# Patient Record
Sex: FEMALE
[De-identification: names, ages, dates, MRNs, and addresses within clinical notes are randomized per-mention and may not be internally consistent; named-entity substitution may affect disease eponyms.]

---

## 2021-03-20 ENCOUNTER — NURSE TRIAGE (OUTPATIENT)
Dept: OTHER | Facility: CLINIC | Age: 71
End: 2021-03-20

## 2021-03-21 NOTE — TELEPHONE ENCOUNTER
Reason for Disposition   [1] MILD-MODERATE headache AND [2] present > 72 hours    Answer Assessment - Initial Assessment Questions  1. LOCATION: \"Where does it hurt? \"   Top of head    2. ONSET: \"When did the headache start? \" (Minutes, hours or days)   Has had it for the past week    3. PATTERN: \"Does the pain come and go, or has it been constant since it started? \"  Comes and goes with tylenol    4. SEVERITY: \"How bad is the pain? \" and \"What does it keep you from doing? \"  (e.g., Scale 1-10; mild, moderate, or severe)    - MILD (1-3): doesn't interfere with normal activities     - MODERATE (4-7): interferes with normal activities or awakens from sleep     - SEVERE (8-10): excruciating pain, unable to do any normal activities     4/10    5. RECURRENT SYMPTOM: \"Have you ever had headaches before? \" If so, ask: \"When was the last time? \" and \"What happened that time? \"      No    6. CAUSE: \"What do you think is causing the headache? \"  Unsure    7. MIGRAINE: \"Have you been diagnosed with migraine headaches? \" If so, ask: \"Is this headache similar? \"   No    8. HEAD INJURY: \"Has there been any recent injury to the head? \"    no    9. OTHER SYMPTOMS: \"Do you have any other symptoms? \" (fever, stiff neck, eye pain, sore throat, cold symptoms)  No    10. PREGNANCY: \"Is there any chance you are pregnant? \" \"When was your last menstrual period? \"      no    Protocols used: HEADACHE-ADULT-    Brief description of triage: headache    Triage indicates for patient to be seen within 3 days. Care advice provided, patient verbalizes understanding; denies any other questions or concerns; instructed to call back for any new or worsening symptoms. This triage is a result of a call to [address] [place] [address]. Please do not respond to the triage nurse through this encounter. Any subsequent communication should be directly with the patient.

## 2023-03-08 PROBLEM — M54.12 LEFT CERVICAL RADICULOPATHY: Status: ACTIVE | Noted: 2023-03-08

## 2023-03-08 PROBLEM — N28.89 RIGHT KIDNEY MASS: Status: ACTIVE | Noted: 2023-03-08

## 2023-03-08 PROBLEM — E53.8 DEFICIENCY OF VITAMIN B12: Status: ACTIVE | Noted: 2023-03-08

## 2023-03-08 PROBLEM — R31.9 HEMATURIA: Status: ACTIVE | Noted: 2023-03-08

## 2023-03-08 PROBLEM — G91.2 NORMAL PRESSURE HYDROCEPHALUS (MULTI): Status: ACTIVE | Noted: 2023-03-08

## 2023-03-08 PROBLEM — R92.8 ABNORMAL MAMMOGRAPHY: Status: ACTIVE | Noted: 2023-03-08

## 2023-03-08 PROBLEM — F41.8 DEPRESSION WITH ANXIETY: Status: ACTIVE | Noted: 2023-03-08

## 2023-03-08 PROBLEM — M25.561 KNEE PAIN, RIGHT: Status: ACTIVE | Noted: 2023-03-08

## 2023-03-08 PROBLEM — S03.40XA TMJ (SPRAIN OF TEMPOROMANDIBULAR JOINT): Status: ACTIVE | Noted: 2023-03-08

## 2023-03-08 PROBLEM — E11.9 DIABETES MELLITUS TYPE 2, CONTROLLED (MULTI): Status: ACTIVE | Noted: 2023-03-08

## 2023-03-08 PROBLEM — H43.392 VITREOUS FLOATERS OF LEFT EYE: Status: ACTIVE | Noted: 2023-03-08

## 2023-03-08 PROBLEM — N32.0 BLADDER OUTLET OBSTRUCTION: Status: ACTIVE | Noted: 2023-03-08

## 2023-03-08 PROBLEM — I65.02 VERTEBRAL ARTERY OCCLUSION, LEFT: Status: ACTIVE | Noted: 2023-03-08

## 2023-03-08 PROBLEM — M16.11 PRIMARY LOCALIZED OSTEOARTHRITIS OF RIGHT HIP: Status: ACTIVE | Noted: 2023-03-08

## 2023-03-08 PROBLEM — M96.1 POSTLAMINECTOMY SYNDROME, LUMBAR: Status: ACTIVE | Noted: 2023-03-08

## 2023-03-08 PROBLEM — R16.0 LIVER MASS: Status: ACTIVE | Noted: 2023-03-08
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
## 2023-03-08 PROBLEM — M54.12 CERVICAL RADICULOPATHY AT C6: Status: ACTIVE | Noted: 2023-03-08

## 2023-03-08 PROBLEM — M79.18 MYOFASCIAL PAIN SYNDROME: Status: ACTIVE | Noted: 2023-03-08

## 2023-03-08 PROBLEM — B35.6 TINEA CRURIS: Status: ACTIVE | Noted: 2023-03-08

## 2023-03-08 PROBLEM — M70.60 TROCHANTERIC BURSITIS: Status: ACTIVE | Noted: 2023-03-08

## 2023-03-08 PROBLEM — M75.42 IMPINGEMENT SYNDROME OF LEFT SHOULDER: Status: ACTIVE | Noted: 2023-03-08

## 2023-03-08 PROBLEM — R07.89 ATYPICAL CHEST PAIN: Status: ACTIVE | Noted: 2023-03-08

## 2023-03-08 PROBLEM — M25.551 RIGHT HIP PAIN: Status: ACTIVE | Noted: 2023-03-08

## 2023-03-08 PROBLEM — M54.16 LUMBAR RADICULITIS: Status: ACTIVE | Noted: 2023-03-08

## 2023-03-08 PROBLEM — I10 ESSENTIAL HYPERTENSION: Status: ACTIVE | Noted: 2023-03-08

## 2023-03-08 PROBLEM — K59.09 CHRONIC CONSTIPATION: Status: ACTIVE | Noted: 2023-03-08

## 2023-03-08 PROBLEM — I25.10 CAD (CORONARY ARTERY DISEASE): Status: ACTIVE | Noted: 2023-03-08

## 2023-03-08 PROBLEM — M25.50 JOINT PAIN: Status: ACTIVE | Noted: 2023-03-08

## 2023-03-08 PROBLEM — D64.9 ANEMIA: Status: ACTIVE | Noted: 2023-03-08

## 2023-03-08 PROBLEM — Z85.3 HISTORY OF BREAST CANCER: Status: ACTIVE | Noted: 2023-03-08

## 2023-03-08 PROBLEM — K21.9 GERD WITHOUT ESOPHAGITIS: Status: ACTIVE | Noted: 2023-03-08

## 2023-03-08 PROBLEM — M25.562 KNEE PAIN, LEFT: Status: ACTIVE | Noted: 2023-03-08

## 2023-03-08 PROBLEM — Z96.652 STATUS POST LEFT KNEE REPLACEMENT: Status: ACTIVE | Noted: 2023-03-08

## 2023-03-08 PROBLEM — C64.1 RENAL CELL CARCINOMA OF RIGHT KIDNEY (MULTI): Status: ACTIVE | Noted: 2023-03-08

## 2023-03-08 PROBLEM — G89.29 CHRONIC BACK PAIN: Status: ACTIVE | Noted: 2023-03-08

## 2023-03-08 PROBLEM — H65.92 LEFT SEROUS OTITIS MEDIA: Status: ACTIVE | Noted: 2023-03-08

## 2023-03-08 PROBLEM — J02.9 SORE THROAT: Status: ACTIVE | Noted: 2023-03-08

## 2023-03-08 PROBLEM — R42 DIZZINESS: Status: ACTIVE | Noted: 2023-03-08

## 2023-03-08 PROBLEM — M53.3 SACROILIAC JOINT PAIN: Status: ACTIVE | Noted: 2023-03-08

## 2023-03-08 PROBLEM — G89.4 CHRONIC PAIN SYNDROME: Status: ACTIVE | Noted: 2023-03-08

## 2023-03-08 PROBLEM — M48.061 LUMBAR STENOSIS: Status: ACTIVE | Noted: 2023-03-08

## 2023-03-08 PROBLEM — R53.83 FATIGUE: Status: ACTIVE | Noted: 2023-03-08

## 2023-03-08 PROBLEM — M25.511 BILATERAL SHOULDER PAIN: Status: ACTIVE | Noted: 2023-03-08

## 2023-03-08 PROBLEM — M79.609 LIMB PAIN: Status: ACTIVE | Noted: 2023-03-08

## 2023-03-08 PROBLEM — R51.9 HEADACHE, CHRONIC DAILY: Status: ACTIVE | Noted: 2023-03-08

## 2023-03-08 PROBLEM — M25.512 BILATERAL SHOULDER PAIN: Status: ACTIVE | Noted: 2023-03-08

## 2023-03-08 PROBLEM — G47.00 INSOMNIA: Status: ACTIVE | Noted: 2023-03-08

## 2023-03-08 PROBLEM — M75.41 IMPINGEMENT SYNDROME OF RIGHT SHOULDER: Status: ACTIVE | Noted: 2023-03-08

## 2023-03-08 PROBLEM — M54.9 CHRONIC BACK PAIN: Status: ACTIVE | Noted: 2023-03-08

## 2023-03-08 PROBLEM — E78.00 HYPERCHOLESTEROLEMIA: Status: ACTIVE | Noted: 2023-03-08

## 2023-03-08 PROBLEM — R06.02 SOB (SHORTNESS OF BREATH) ON EXERTION: Status: ACTIVE | Noted: 2023-03-08

## 2023-03-08 PROBLEM — B37.0 ORAL THRUSH: Status: ACTIVE | Noted: 2023-03-08

## 2023-03-08 PROBLEM — N20.0 RENAL CALCULUS: Status: ACTIVE | Noted: 2023-03-08

## 2023-03-08 PROBLEM — M43.10 DEGENERATIVE SPONDYLOLISTHESIS: Status: ACTIVE | Noted: 2023-03-08

## 2023-03-08 PROBLEM — M17.11 PRIMARY OSTEOARTHRITIS OF RIGHT KNEE: Status: ACTIVE | Noted: 2023-03-08

## 2023-03-08 PROBLEM — M15.9 GENERALIZED OSTEOARTHRITIS OF MULTIPLE SITES: Status: ACTIVE | Noted: 2023-03-08

## 2023-03-08 PROBLEM — M54.50 LOW BACK PAIN: Status: ACTIVE | Noted: 2023-03-08

## 2023-03-08 RX ORDER — ATORVASTATIN CALCIUM 20 MG/1
10 TABLET, FILM COATED ORAL DAILY
COMMUNITY
Start: 2014-09-22 | End: 2023-07-10

## 2023-03-08 RX ORDER — ZOLPIDEM TARTRATE 10 MG/1
TABLET ORAL
COMMUNITY
Start: 2019-12-05 | End: 2023-06-27 | Stop reason: ALTCHOICE

## 2023-03-08 RX ORDER — HYDROCHLOROTHIAZIDE 50 MG/1
50 TABLET ORAL DAILY
COMMUNITY
Start: 2017-11-09

## 2023-03-08 RX ORDER — POTASSIUM CHLORIDE 1500 MG/1
TABLET, EXTENDED RELEASE ORAL
COMMUNITY
Start: 2017-11-09 | End: 2023-06-27 | Stop reason: SDUPTHER

## 2023-03-08 RX ORDER — ASPIRIN 81 MG/1
1 TABLET ORAL DAILY
COMMUNITY
Start: 2021-04-02

## 2023-03-08 RX ORDER — HYDROCORTISONE 25 MG/G
CREAM TOPICAL 2 TIMES DAILY
COMMUNITY
Start: 2022-09-19

## 2023-03-08 RX ORDER — DULOXETIN HYDROCHLORIDE 60 MG/1
60 CAPSULE, DELAYED RELEASE ORAL DAILY
COMMUNITY
Start: 2017-03-01 | End: 2023-09-28

## 2023-03-08 RX ORDER — METFORMIN HYDROCHLORIDE 500 MG/1
TABLET, EXTENDED RELEASE ORAL
COMMUNITY
Start: 2014-10-24 | End: 2023-06-27 | Stop reason: ALTCHOICE

## 2023-03-08 RX ORDER — TRAZODONE HYDROCHLORIDE 50 MG/1
TABLET ORAL
COMMUNITY
Start: 2019-11-22 | End: 2023-07-12

## 2023-03-08 RX ORDER — BLOOD-GLUCOSE METER
EACH MISCELLANEOUS
COMMUNITY
Start: 2022-09-19 | End: 2024-02-26 | Stop reason: SDUPTHER

## 2023-03-08 RX ORDER — AMLODIPINE BESYLATE 5 MG/1
5 TABLET ORAL DAILY
COMMUNITY
Start: 2020-02-11 | End: 2023-07-24

## 2023-03-08 RX ORDER — VALACYCLOVIR HYDROCHLORIDE 500 MG/1
500 TABLET, FILM COATED ORAL 2 TIMES DAILY
COMMUNITY
End: 2023-06-27 | Stop reason: ALTCHOICE

## 2023-03-15 ENCOUNTER — OFFICE VISIT (OUTPATIENT)
Dept: PRIMARY CARE | Facility: CLINIC | Age: 73
End: 2023-03-15
Payer: MEDICARE

## 2023-03-15 VITALS
OXYGEN SATURATION: 94 % | TEMPERATURE: 97.9 F | BODY MASS INDEX: 23.28 KG/M2 | SYSTOLIC BLOOD PRESSURE: 120 MMHG | HEIGHT: 66 IN | DIASTOLIC BLOOD PRESSURE: 80 MMHG | HEART RATE: 94 BPM

## 2023-03-15 DIAGNOSIS — M54.50 CHRONIC LOW BACK PAIN, UNSPECIFIED BACK PAIN LATERALITY, UNSPECIFIED WHETHER SCIATICA PRESENT: ICD-10-CM

## 2023-03-15 DIAGNOSIS — G89.29 CHRONIC LOW BACK PAIN, UNSPECIFIED BACK PAIN LATERALITY, UNSPECIFIED WHETHER SCIATICA PRESENT: ICD-10-CM

## 2023-03-15 DIAGNOSIS — E11.8 CONTROLLED TYPE 2 DIABETES MELLITUS WITH COMPLICATION, WITHOUT LONG-TERM CURRENT USE OF INSULIN (MULTI): Primary | ICD-10-CM

## 2023-03-15 DIAGNOSIS — E78.00 HYPERCHOLESTEROLEMIA: ICD-10-CM

## 2023-03-15 PROBLEM — C64.1 RENAL CELL CARCINOMA OF RIGHT KIDNEY (MULTI): Status: RESOLVED | Noted: 2023-03-08 | Resolved: 2023-03-15

## 2023-03-15 PROCEDURE — 1036F TOBACCO NON-USER: CPT | Performed by: FAMILY MEDICINE

## 2023-03-15 PROCEDURE — 99213 OFFICE O/P EST LOW 20 MIN: CPT | Performed by: FAMILY MEDICINE

## 2023-03-15 PROCEDURE — 3074F SYST BP LT 130 MM HG: CPT | Performed by: FAMILY MEDICINE

## 2023-03-15 PROCEDURE — 3079F DIAST BP 80-89 MM HG: CPT | Performed by: FAMILY MEDICINE

## 2023-03-15 PROCEDURE — 1159F MED LIST DOCD IN RCRD: CPT | Performed by: FAMILY MEDICINE

## 2023-03-15 RX ORDER — LANCETS 33 GAUGE
EACH MISCELLANEOUS
COMMUNITY
Start: 2022-10-07

## 2023-03-15 RX ORDER — MULTIVIT-MIN/IRON FUM/FOLIC AC 7.5 MG-4
TABLET ORAL
COMMUNITY

## 2023-03-15 RX ORDER — ATORVASTATIN CALCIUM 20 MG/1
0.5 TABLET, FILM COATED ORAL DAILY
COMMUNITY
Start: 2014-09-22 | End: 2023-03-15

## 2023-03-15 RX ORDER — OXYCODONE AND ACETAMINOPHEN 5; 325 MG/1; MG/1
1 TABLET ORAL 3 TIMES DAILY
COMMUNITY

## 2023-03-15 RX ORDER — DULOXETIN HYDROCHLORIDE 60 MG/1
1 CAPSULE, DELAYED RELEASE ORAL DAILY
COMMUNITY
Start: 2017-03-01 | End: 2023-06-27 | Stop reason: SDUPTHER

## 2023-03-15 RX ORDER — METFORMIN HYDROCHLORIDE 500 MG/1
TABLET, EXTENDED RELEASE ORAL 2 TIMES DAILY
COMMUNITY
Start: 2014-10-24 | End: 2023-03-15

## 2023-03-15 RX ORDER — OMEPRAZOLE 40 MG/1
CAPSULE, DELAYED RELEASE ORAL
COMMUNITY
Start: 2022-10-31 | End: 2023-06-27 | Stop reason: ALTCHOICE

## 2023-03-15 RX ORDER — POTASSIUM CHLORIDE 20 MEQ/1
TABLET, EXTENDED RELEASE ORAL
COMMUNITY
Start: 2023-02-25 | End: 2023-03-15

## 2023-03-15 RX ORDER — AMLODIPINE BESYLATE 5 MG/1
1 TABLET ORAL DAILY
COMMUNITY
Start: 2020-02-11 | End: 2023-03-15

## 2023-03-15 RX ORDER — HYDROCHLOROTHIAZIDE 50 MG/1
1 TABLET ORAL DAILY
COMMUNITY
Start: 2017-11-09 | End: 2023-06-27 | Stop reason: SDUPTHER

## 2023-03-15 RX ORDER — ZOLPIDEM TARTRATE 10 MG/1
1 TABLET ORAL NIGHTLY PRN
COMMUNITY
Start: 2019-12-05 | End: 2023-03-15

## 2023-03-15 RX ORDER — POTASSIUM CHLORIDE 1500 MG/1
TABLET, EXTENDED RELEASE ORAL
COMMUNITY
Start: 2017-11-09 | End: 2023-03-15

## 2023-03-15 RX ORDER — VALACYCLOVIR HYDROCHLORIDE 500 MG/1
1 TABLET, FILM COATED ORAL 2 TIMES DAILY
COMMUNITY
Start: 2022-10-04 | End: 2023-03-15

## 2023-03-15 RX ORDER — ACETAMINOPHEN 500 MG
TABLET ORAL
COMMUNITY

## 2023-03-15 RX ORDER — TRAZODONE HYDROCHLORIDE 50 MG/1
TABLET ORAL
COMMUNITY
Start: 2019-11-22 | End: 2023-03-15

## 2023-03-15 ASSESSMENT — ENCOUNTER SYMPTOMS
OCCASIONAL FEELINGS OF UNSTEADINESS: 0
DEPRESSION: 0
BACK PAIN: 1
LOSS OF SENSATION IN FEET: 0

## 2023-03-15 NOTE — PROGRESS NOTES
"Subjective   Patient ID: Gabby Madrid is a 72 y.o. female who presents for DISCUSS MED (DISCUSS BS).    HPI     Review of Systems   Endocrine:        Patient is just taking a half of the metformin once a day but she still having hypoglycemic episodes.   Musculoskeletal:  Positive for back pain.       Objective   /80 (BP Location: Left arm)   Pulse 94   Temp 36.6 °C (97.9 °F) (Temporal)   Ht 1.676 m (5' 6\")   SpO2 94%   BMI 23.28 kg/m²     Physical Exam  Vitals reviewed.   Constitutional:       Appearance: Normal appearance.   Cardiovascular:      Rate and Rhythm: Regular rhythm.   Pulmonary:      Breath sounds: Normal breath sounds.         Assessment/Plan   Problem List Items Addressed This Visit          Endocrine/Metabolic    Diabetes mellitus type 2, controlled (CMS/HCC) - Primary       Other    Chronic back pain    Hypercholesterolemia          "

## 2023-03-31 PROBLEM — J01.41 ACUTE RECURRENT PANSINUSITIS: Status: ACTIVE | Noted: 2023-03-31

## 2023-03-31 PROBLEM — N20.0 KIDNEY STONE: Status: ACTIVE | Noted: 2023-03-31

## 2023-03-31 PROBLEM — M25.551 CHRONIC RIGHT HIP PAIN: Status: ACTIVE | Noted: 2023-03-31

## 2023-03-31 PROBLEM — B08.5 HERPANGINA: Status: ACTIVE | Noted: 2023-03-31

## 2023-03-31 PROBLEM — R19.00 ABDOMINAL MASS: Status: ACTIVE | Noted: 2023-03-31

## 2023-03-31 PROBLEM — G89.29 CHRONIC RIGHT HIP PAIN: Status: ACTIVE | Noted: 2023-03-31

## 2023-03-31 PROBLEM — J20.9 ACUTE BRONCHITIS: Status: ACTIVE | Noted: 2023-03-31

## 2023-03-31 PROBLEM — M25.519 SHOULDER PAIN: Status: ACTIVE | Noted: 2023-03-31

## 2023-03-31 RX ORDER — METFORMIN HYDROCHLORIDE 500 MG/1
0.5 TABLET, EXTENDED RELEASE ORAL 2 TIMES DAILY
COMMUNITY
Start: 2014-10-24 | End: 2023-06-27 | Stop reason: ALTCHOICE

## 2023-03-31 RX ORDER — POTASSIUM CHLORIDE 1500 MG/1
TABLET, EXTENDED RELEASE ORAL
COMMUNITY
Start: 2017-11-09

## 2023-04-07 ENCOUNTER — LAB (OUTPATIENT)
Dept: LAB | Facility: LAB | Age: 73
End: 2023-04-07
Payer: MEDICARE

## 2023-04-07 ENCOUNTER — OFFICE VISIT (OUTPATIENT)
Dept: PRIMARY CARE | Facility: CLINIC | Age: 73
End: 2023-04-07
Payer: MEDICARE

## 2023-04-07 VITALS
TEMPERATURE: 97.8 F | HEART RATE: 75 BPM | WEIGHT: 151.6 LBS | BODY MASS INDEX: 24.36 KG/M2 | OXYGEN SATURATION: 98 % | DIASTOLIC BLOOD PRESSURE: 72 MMHG | SYSTOLIC BLOOD PRESSURE: 112 MMHG | HEIGHT: 66 IN

## 2023-04-07 DIAGNOSIS — Z00.00 MEDICARE ANNUAL WELLNESS VISIT, SUBSEQUENT: Primary | ICD-10-CM

## 2023-04-07 DIAGNOSIS — R53.83 OTHER FATIGUE: ICD-10-CM

## 2023-04-07 DIAGNOSIS — I25.118 CORONARY ARTERY DISEASE INVOLVING NATIVE CORONARY ARTERY OF NATIVE HEART WITH OTHER FORM OF ANGINA PECTORIS (CMS-HCC): ICD-10-CM

## 2023-04-07 DIAGNOSIS — E78.00 HYPERCHOLESTEROLEMIA: ICD-10-CM

## 2023-04-07 DIAGNOSIS — E11.8 CONTROLLED TYPE 2 DIABETES MELLITUS WITH COMPLICATION, WITHOUT LONG-TERM CURRENT USE OF INSULIN (MULTI): ICD-10-CM

## 2023-04-07 DIAGNOSIS — Z12.39 BREAST SCREENING: ICD-10-CM

## 2023-04-07 DIAGNOSIS — F41.8 DEPRESSION WITH ANXIETY: ICD-10-CM

## 2023-04-07 DIAGNOSIS — Z12.31 BREAST CANCER SCREENING BY MAMMOGRAM: ICD-10-CM

## 2023-04-07 LAB
ALANINE AMINOTRANSFERASE (SGPT) (U/L) IN SER/PLAS: 21 U/L (ref 7–45)
ALBUMIN (G/DL) IN SER/PLAS: 4.3 G/DL (ref 3.4–5)
ALBUMIN (MG/L) IN URINE: 22.7 MG/L
ALBUMIN/CREATININE (UG/MG) IN URINE: 22.3 UG/MG CRT (ref 0–30)
ALKALINE PHOSPHATASE (U/L) IN SER/PLAS: 65 U/L (ref 33–136)
ANION GAP IN SER/PLAS: 12 MMOL/L (ref 10–20)
ASPARTATE AMINOTRANSFERASE (SGOT) (U/L) IN SER/PLAS: 23 U/L (ref 9–39)
BASOPHILS (10*3/UL) IN BLOOD BY AUTOMATED COUNT: 0.06 X10E9/L (ref 0–0.1)
BASOPHILS/100 LEUKOCYTES IN BLOOD BY AUTOMATED COUNT: 0.7 % (ref 0–2)
BILIRUBIN TOTAL (MG/DL) IN SER/PLAS: 1 MG/DL (ref 0–1.2)
CALCIUM (MG/DL) IN SER/PLAS: 9.4 MG/DL (ref 8.6–10.6)
CARBON DIOXIDE, TOTAL (MMOL/L) IN SER/PLAS: 34 MMOL/L (ref 21–32)
CHLORIDE (MMOL/L) IN SER/PLAS: 101 MMOL/L (ref 98–107)
CHOLESTEROL (MG/DL) IN SER/PLAS: 175 MG/DL (ref 0–199)
CHOLESTEROL IN HDL (MG/DL) IN SER/PLAS: 60 MG/DL
CHOLESTEROL/HDL RATIO: 2.9
COBALAMIN (VITAMIN B12) (PG/ML) IN SER/PLAS: 1429 PG/ML (ref 211–911)
CREATININE (MG/DL) IN SER/PLAS: 0.75 MG/DL (ref 0.5–1.05)
CREATININE (MG/DL) IN URINE: 102 MG/DL (ref 20–320)
EOSINOPHILS (10*3/UL) IN BLOOD BY AUTOMATED COUNT: 0.22 X10E9/L (ref 0–0.4)
EOSINOPHILS/100 LEUKOCYTES IN BLOOD BY AUTOMATED COUNT: 2.5 % (ref 0–6)
ERYTHROCYTE DISTRIBUTION WIDTH (RATIO) BY AUTOMATED COUNT: 13.9 % (ref 11.5–14.5)
ERYTHROCYTE MEAN CORPUSCULAR HEMOGLOBIN CONCENTRATION (G/DL) BY AUTOMATED: 32.4 G/DL (ref 32–36)
ERYTHROCYTE MEAN CORPUSCULAR VOLUME (FL) BY AUTOMATED COUNT: 95 FL (ref 80–100)
ERYTHROCYTES (10*6/UL) IN BLOOD BY AUTOMATED COUNT: 4.7 X10E12/L (ref 4–5.2)
ESTIMATED AVERAGE GLUCOSE FOR HBA1C: 128 MG/DL
GFR FEMALE: 85 ML/MIN/1.73M2
GLUCOSE (MG/DL) IN SER/PLAS: 100 MG/DL (ref 74–99)
HEMATOCRIT (%) IN BLOOD BY AUTOMATED COUNT: 44.7 % (ref 36–46)
HEMOGLOBIN (G/DL) IN BLOOD: 14.5 G/DL (ref 12–16)
HEMOGLOBIN A1C/HEMOGLOBIN TOTAL IN BLOOD: 6.1 %
IMMATURE GRANULOCYTES/100 LEUKOCYTES IN BLOOD BY AUTOMATED COUNT: 0.5 % (ref 0–0.9)
LDL: 91 MG/DL (ref 0–99)
LEUKOCYTES (10*3/UL) IN BLOOD BY AUTOMATED COUNT: 8.8 X10E9/L (ref 4.4–11.3)
LYMPHOCYTES (10*3/UL) IN BLOOD BY AUTOMATED COUNT: 2.48 X10E9/L (ref 0.8–3)
LYMPHOCYTES/100 LEUKOCYTES IN BLOOD BY AUTOMATED COUNT: 28.2 % (ref 13–44)
MONOCYTES (10*3/UL) IN BLOOD BY AUTOMATED COUNT: 0.7 X10E9/L (ref 0.05–0.8)
MONOCYTES/100 LEUKOCYTES IN BLOOD BY AUTOMATED COUNT: 8 % (ref 2–10)
NEUTROPHILS (10*3/UL) IN BLOOD BY AUTOMATED COUNT: 5.29 X10E9/L (ref 1.6–5.5)
NEUTROPHILS/100 LEUKOCYTES IN BLOOD BY AUTOMATED COUNT: 60.1 % (ref 40–80)
NRBC (PER 100 WBCS) BY AUTOMATED COUNT: 0 /100 WBC (ref 0–0)
PLATELETS (10*3/UL) IN BLOOD AUTOMATED COUNT: 246 X10E9/L (ref 150–450)
POTASSIUM (MMOL/L) IN SER/PLAS: 3.4 MMOL/L (ref 3.5–5.3)
PROTEIN TOTAL: 6.4 G/DL (ref 6.4–8.2)
SODIUM (MMOL/L) IN SER/PLAS: 144 MMOL/L (ref 136–145)
TRIGLYCERIDE (MG/DL) IN SER/PLAS: 119 MG/DL (ref 0–149)
UREA NITROGEN (MG/DL) IN SER/PLAS: 15 MG/DL (ref 6–23)
VLDL: 24 MG/DL (ref 0–40)

## 2023-04-07 PROCEDURE — 80053 COMPREHEN METABOLIC PANEL: CPT

## 2023-04-07 PROCEDURE — 1159F MED LIST DOCD IN RCRD: CPT | Performed by: FAMILY MEDICINE

## 2023-04-07 PROCEDURE — 82607 VITAMIN B-12: CPT

## 2023-04-07 PROCEDURE — 85025 COMPLETE CBC W/AUTO DIFF WBC: CPT

## 2023-04-07 PROCEDURE — 80061 LIPID PANEL: CPT

## 2023-04-07 PROCEDURE — 3074F SYST BP LT 130 MM HG: CPT | Performed by: FAMILY MEDICINE

## 2023-04-07 PROCEDURE — 3078F DIAST BP <80 MM HG: CPT | Performed by: FAMILY MEDICINE

## 2023-04-07 PROCEDURE — 99497 ADVNCD CARE PLAN 30 MIN: CPT | Performed by: FAMILY MEDICINE

## 2023-04-07 PROCEDURE — G0439 PPPS, SUBSEQ VISIT: HCPCS | Performed by: FAMILY MEDICINE

## 2023-04-07 PROCEDURE — 82570 ASSAY OF URINE CREATININE: CPT

## 2023-04-07 PROCEDURE — 1160F RVW MEDS BY RX/DR IN RCRD: CPT | Performed by: FAMILY MEDICINE

## 2023-04-07 PROCEDURE — 1036F TOBACCO NON-USER: CPT | Performed by: FAMILY MEDICINE

## 2023-04-07 PROCEDURE — 82043 UR ALBUMIN QUANTITATIVE: CPT

## 2023-04-07 PROCEDURE — 83036 HEMOGLOBIN GLYCOSYLATED A1C: CPT

## 2023-04-07 PROCEDURE — 36415 COLL VENOUS BLD VENIPUNCTURE: CPT

## 2023-04-07 PROCEDURE — 99397 PER PM REEVAL EST PAT 65+ YR: CPT | Performed by: FAMILY MEDICINE

## 2023-04-07 RX ORDER — PLECANATIDE 3 MG/1
3 TABLET ORAL
Qty: 30 TABLET | Refills: 2 | COMMUNITY
Start: 2023-01-12 | End: 2023-04-12

## 2023-04-07 RX ORDER — IBUPROFEN 200 MG
TABLET ORAL
COMMUNITY
Start: 2008-01-28

## 2023-04-07 RX ORDER — CYANOCOBALAMIN, ISOPROPYL ALCOHOL 1000MCG/ML
1 KIT INJECTION
Qty: 1 KIT | Refills: 1 | Status: SHIPPED | OUTPATIENT
Start: 2023-04-07

## 2023-04-07 RX ORDER — AMITRIPTYLINE HYDROCHLORIDE 25 MG/1
1 TABLET, FILM COATED ORAL NIGHTLY
COMMUNITY
Start: 2020-12-03 | End: 2023-06-27 | Stop reason: ALTCHOICE

## 2023-04-07 RX ORDER — ATORVASTATIN CALCIUM 10 MG/1
1 TABLET, FILM COATED ORAL DAILY
COMMUNITY
Start: 2008-01-28 | End: 2023-06-27 | Stop reason: SDUPTHER

## 2023-04-07 RX ORDER — LUBIPROSTONE 24 UG/1
24 CAPSULE ORAL
COMMUNITY
Start: 2019-04-23 | End: 2023-06-27 | Stop reason: ALTCHOICE

## 2023-04-07 SDOH — ECONOMIC STABILITY: HOUSING INSECURITY
IN THE LAST 12 MONTHS, WAS THERE A TIME WHEN YOU DID NOT HAVE A STEADY PLACE TO SLEEP OR SLEPT IN A SHELTER (INCLUDING NOW)?: NO

## 2023-04-07 SDOH — ECONOMIC STABILITY: INCOME INSECURITY: IN THE LAST 12 MONTHS, WAS THERE A TIME WHEN YOU WERE NOT ABLE TO PAY THE MORTGAGE OR RENT ON TIME?: NO

## 2023-04-07 SDOH — ECONOMIC STABILITY: TRANSPORTATION INSECURITY
IN THE PAST 12 MONTHS, HAS LACK OF TRANSPORTATION KEPT YOU FROM MEETINGS, WORK, OR FROM GETTING THINGS NEEDED FOR DAILY LIVING?: NO

## 2023-04-07 SDOH — ECONOMIC STABILITY: FOOD INSECURITY: WITHIN THE PAST 12 MONTHS, YOU WORRIED THAT YOUR FOOD WOULD RUN OUT BEFORE YOU GOT MONEY TO BUY MORE.: NEVER TRUE

## 2023-04-07 SDOH — ECONOMIC STABILITY: FOOD INSECURITY: WITHIN THE PAST 12 MONTHS, THE FOOD YOU BOUGHT JUST DIDN'T LAST AND YOU DIDN'T HAVE MONEY TO GET MORE.: NEVER TRUE

## 2023-04-07 SDOH — ECONOMIC STABILITY: TRANSPORTATION INSECURITY
IN THE PAST 12 MONTHS, HAS THE LACK OF TRANSPORTATION KEPT YOU FROM MEDICAL APPOINTMENTS OR FROM GETTING MEDICATIONS?: NO

## 2023-04-07 ASSESSMENT — ENCOUNTER SYMPTOMS
FATIGUE: 1
OCCASIONAL FEELINGS OF UNSTEADINESS: 0
DEPRESSION: 0
NEUROLOGICAL NEGATIVE: 1
LOSS OF SENSATION IN FEET: 0
PSYCHIATRIC NEGATIVE: 1
HEMATOLOGIC/LYMPHATIC NEGATIVE: 1

## 2023-04-07 ASSESSMENT — PATIENT HEALTH QUESTIONNAIRE - PHQ9
SUM OF ALL RESPONSES TO PHQ9 QUESTIONS 1 & 2: 0
2. FEELING DOWN, DEPRESSED OR HOPELESS: NOT AT ALL
1. LITTLE INTEREST OR PLEASURE IN DOING THINGS: NOT AT ALL

## 2023-04-07 ASSESSMENT — LIFESTYLE VARIABLES
HOW OFTEN DO YOU HAVE A DRINK CONTAINING ALCOHOL: NEVER
HOW OFTEN DO YOU HAVE SIX OR MORE DRINKS ON ONE OCCASION: NEVER
HOW MANY STANDARD DRINKS CONTAINING ALCOHOL DO YOU HAVE ON A TYPICAL DAY: PATIENT DOES NOT DRINK
AUDIT-C TOTAL SCORE: 0
SKIP TO QUESTIONS 9-10: 1

## 2023-04-07 ASSESSMENT — SOCIAL DETERMINANTS OF HEALTH (SDOH)
WITHIN THE LAST YEAR, HAVE TO BEEN RAPED OR FORCED TO HAVE ANY KIND OF SEXUAL ACTIVITY BY YOUR PARTNER OR EX-PARTNER?: NO
WITHIN THE LAST YEAR, HAVE YOU BEEN AFRAID OF YOUR PARTNER OR EX-PARTNER?: NO
HOW HARD IS IT FOR YOU TO PAY FOR THE VERY BASICS LIKE FOOD, HOUSING, MEDICAL CARE, AND HEATING?: NOT HARD AT ALL
WITHIN THE LAST YEAR, HAVE YOU BEEN HUMILIATED OR EMOTIONALLY ABUSED IN OTHER WAYS BY YOUR PARTNER OR EX-PARTNER?: NO
WITHIN THE LAST YEAR, HAVE YOU BEEN KICKED, HIT, SLAPPED, OR OTHERWISE PHYSICALLY HURT BY YOUR PARTNER OR EX-PARTNER?: NO

## 2023-04-07 ASSESSMENT — PAIN SCALES - GENERAL: PAINLEVEL: 8

## 2023-04-07 NOTE — PROGRESS NOTES
"Subjective   Patient ID: Gabby Madrid is a 73 y.o. female who presents for Annual Exam (Annual medicare wellness fasting bw).    HPI     Review of Systems   Constitutional:  Positive for fatigue.   HENT: Negative.     Cardiovascular:         Dr Roberts cardiac   Genitourinary:         Seeing Dr Antonio June concerning left renal mass   Musculoskeletal:         Dr Pichardo pain managment   Skin: Negative.    Neurological: Negative.    Hematological: Negative.    Psychiatric/Behavioral: Negative.         Objective   /72 (BP Location: Left arm)   Pulse 75   Temp 36.6 °C (97.8 °F) (Temporal)   Ht 1.676 m (5' 6\")   Wt 68.8 kg (151 lb 9.6 oz)   SpO2 98%   BMI 24.47 kg/m²     Physical Exam  Vitals and nursing note reviewed.   HENT:      Right Ear: Tympanic membrane normal.      Left Ear: Tympanic membrane normal.      Mouth/Throat:      Mouth: Mucous membranes are moist.   Cardiovascular:      Rate and Rhythm: Normal rate and regular rhythm.      Pulses: Normal pulses.      Heart sounds: Normal heart sounds.   Pulmonary:      Breath sounds: Normal breath sounds.   Abdominal:      Palpations: Abdomen is soft.   Musculoskeletal:      Comments: Severe lumbar stenosis   Skin:     General: Skin is warm.   Neurological:      General: No focal deficit present.      Mental Status: She is alert.   Psychiatric:         Mood and Affect: Mood normal.         Assessment/Plan   Problem List Items Addressed This Visit          Circulatory    CAD (coronary artery disease)       Endocrine/Metabolic    Diabetes mellitus type 2, controlled (CMS/HCC)       Other    Depression with anxiety    Fatigue    Hypercholesterolemia     Other Visit Diagnoses       Medicare annual wellness visit, subsequent    -  Primary    Breast screening                   "

## 2023-04-07 NOTE — PROGRESS NOTES
Subjective   Reason for Visit: Gabby Madrid is an 73 y.o. female here for a Medicare Wellness visit.               HPI    Patient Care Team:  Darren Brown DO as PCP - General  Darren Brown DO as PCP - [facility] Attributed [facility]     Review of Systems    Objective   Vitals:  There were no vitals taken for this visit.      Physical Exam    Assessment/Plan   Problem List Items Addressed This Visit    None

## 2023-04-12 ENCOUNTER — TELEPHONE (OUTPATIENT)
Dept: PRIMARY CARE | Facility: CLINIC | Age: 73
End: 2023-04-12
Payer: MEDICARE

## 2023-05-03 DIAGNOSIS — K21.9 GERD WITHOUT ESOPHAGITIS: Primary | ICD-10-CM

## 2023-05-03 RX ORDER — PAROXETINE HYDROCHLORIDE 20 MG/1
20 TABLET, FILM COATED ORAL
COMMUNITY
Start: 2012-01-25 | End: 2023-06-27 | Stop reason: ALTCHOICE

## 2023-05-03 RX ORDER — CYANOCOBALAMIN 1000 UG/ML
INJECTION, SOLUTION INTRAMUSCULAR; SUBCUTANEOUS
COMMUNITY
Start: 2023-04-07 | End: 2023-05-03 | Stop reason: SDUPTHER

## 2023-05-03 RX ORDER — ALPRAZOLAM 0.5 MG/1
0.5 TABLET ORAL DAILY PRN
COMMUNITY
End: 2023-06-27 | Stop reason: ALTCHOICE

## 2023-05-03 RX ORDER — CYANOCOBALAMIN 1000 UG/ML
INJECTION, SOLUTION INTRAMUSCULAR; SUBCUTANEOUS
Qty: 1 ML | Refills: 11 | Status: SHIPPED | OUTPATIENT
Start: 2023-05-03

## 2023-05-03 RX ORDER — SUCRALFATE 1 G/10ML
1 SUSPENSION ORAL 4 TIMES DAILY
COMMUNITY
Start: 2018-09-27 | End: 2023-06-27 | Stop reason: ALTCHOICE

## 2023-05-03 RX ORDER — DICYCLOMINE HYDROCHLORIDE 10 MG/1
10 CAPSULE ORAL
COMMUNITY
Start: 2016-06-06 | End: 2023-06-27 | Stop reason: ALTCHOICE

## 2023-05-03 RX ORDER — UBIDECARENONE 75 MG
1 CAPSULE ORAL
COMMUNITY

## 2023-05-03 RX ORDER — TRIAMCINOLONE ACETONIDE 1 MG/G
1 PASTE DENTAL 2 TIMES DAILY
COMMUNITY
Start: 2013-09-09

## 2023-05-03 RX ORDER — EPINEPHRINE 0.22MG
100 AEROSOL WITH ADAPTER (ML) INHALATION
COMMUNITY

## 2023-05-03 RX ORDER — PNV NO.95/FERROUS FUM/FOLIC AC 28MG-0.8MG
1000 TABLET ORAL
COMMUNITY

## 2023-05-03 RX ORDER — POLYETHYLENE GLYCOL 3350 17 G/17G
POWDER, FOR SOLUTION ORAL
COMMUNITY

## 2023-05-03 RX ORDER — PANTOPRAZOLE SODIUM 20 MG/1
40 TABLET, DELAYED RELEASE ORAL 2 TIMES DAILY
COMMUNITY
Start: 2018-09-27 | End: 2023-06-27 | Stop reason: ALTCHOICE

## 2023-05-03 RX ORDER — GABAPENTIN 300 MG/1
CAPSULE ORAL
COMMUNITY
Start: 2015-06-16

## 2023-06-05 PROBLEM — M47.817 LUMBOSACRAL SPONDYLOSIS WITHOUT MYELOPATHY: Status: ACTIVE | Noted: 2021-05-10

## 2023-06-05 PROBLEM — M47.816 LUMBAR SPONDYLOSIS: Status: ACTIVE | Noted: 2023-06-05

## 2023-06-05 PROBLEM — N20.9 UROLITHIASIS: Status: ACTIVE | Noted: 2023-06-05

## 2023-06-05 PROBLEM — R63.4 UNINTENTIONAL WEIGHT LOSS: Status: ACTIVE | Noted: 2021-12-06

## 2023-06-05 PROBLEM — M54.10 RADICULITIS: Status: ACTIVE | Noted: 2023-06-05

## 2023-06-05 PROBLEM — A04.8 H. PYLORI INFECTION: Status: ACTIVE | Noted: 2020-01-28

## 2023-06-05 PROBLEM — N95.2 ATROPHIC VAGINITIS: Status: ACTIVE | Noted: 2023-06-05

## 2023-06-05 PROBLEM — M50.20 HERNIATED DISC, CERVICAL: Status: ACTIVE | Noted: 2023-06-05

## 2023-06-05 PROBLEM — M70.62 GREATER TROCHANTERIC BURSITIS OF LEFT HIP: Status: ACTIVE | Noted: 2023-06-05

## 2023-06-05 PROBLEM — G89.4 CHRONIC PAIN DISORDER: Status: ACTIVE | Noted: 2023-06-05

## 2023-06-05 PROBLEM — K92.1 BLACK STOOLS: Status: ACTIVE | Noted: 2021-12-06

## 2023-06-05 PROBLEM — M96.1 LUMBAR POSTLAMINECTOMY SYNDROME: Status: ACTIVE | Noted: 2023-06-05

## 2023-06-05 PROBLEM — M85.80 OSTEOPENIA: Status: ACTIVE | Noted: 2023-06-05

## 2023-06-05 PROBLEM — R10.13 EPIGASTRIC PAIN: Status: ACTIVE | Noted: 2021-12-06

## 2023-06-05 PROBLEM — R68.82 DECREASED LIBIDO: Status: ACTIVE | Noted: 2023-06-05

## 2023-06-19 ENCOUNTER — TELEPHONE (OUTPATIENT)
Dept: PRIMARY CARE | Facility: CLINIC | Age: 73
End: 2023-06-19
Payer: MEDICARE

## 2023-06-19 DIAGNOSIS — M48.062 SPINAL STENOSIS OF LUMBAR REGION WITH NEUROGENIC CLAUDICATION: Primary | ICD-10-CM

## 2023-06-19 NOTE — TELEPHONE ENCOUNTER
Pt called and states that she has requested a handicap prescription over 2 weeks ago and hasn't heard anything. Pt would like to know if you write her the handicap prescription.

## 2023-06-27 ENCOUNTER — OFFICE VISIT (OUTPATIENT)
Dept: PRIMARY CARE | Facility: CLINIC | Age: 73
End: 2023-06-27
Payer: MEDICARE

## 2023-06-27 VITALS
DIASTOLIC BLOOD PRESSURE: 78 MMHG | TEMPERATURE: 97.6 F | WEIGHT: 153.6 LBS | BODY MASS INDEX: 24.68 KG/M2 | HEIGHT: 66 IN | SYSTOLIC BLOOD PRESSURE: 120 MMHG

## 2023-06-27 DIAGNOSIS — G89.4 CHRONIC PAIN DISORDER: Primary | ICD-10-CM

## 2023-06-27 DIAGNOSIS — M15.9 GENERALIZED OSTEOARTHRITIS OF MULTIPLE SITES: ICD-10-CM

## 2023-06-27 DIAGNOSIS — M48.062 SPINAL STENOSIS OF LUMBAR REGION WITH NEUROGENIC CLAUDICATION: ICD-10-CM

## 2023-06-27 LAB
RHEUMATOID FACTOR (IU/ML) IN SERUM OR PLASMA: <10 IU/ML (ref 0–15)
SEDIMENTATION RATE, ERYTHROCYTE: 2 MM/H (ref 0–30)

## 2023-06-27 PROCEDURE — 1036F TOBACCO NON-USER: CPT | Performed by: FAMILY MEDICINE

## 2023-06-27 PROCEDURE — 99214 OFFICE O/P EST MOD 30 MIN: CPT | Performed by: FAMILY MEDICINE

## 2023-06-27 PROCEDURE — 86431 RHEUMATOID FACTOR QUANT: CPT

## 2023-06-27 PROCEDURE — 3044F HG A1C LEVEL LT 7.0%: CPT | Performed by: FAMILY MEDICINE

## 2023-06-27 PROCEDURE — 85652 RBC SED RATE AUTOMATED: CPT

## 2023-06-27 PROCEDURE — 3074F SYST BP LT 130 MM HG: CPT | Performed by: FAMILY MEDICINE

## 2023-06-27 PROCEDURE — 1160F RVW MEDS BY RX/DR IN RCRD: CPT | Performed by: FAMILY MEDICINE

## 2023-06-27 PROCEDURE — 3078F DIAST BP <80 MM HG: CPT | Performed by: FAMILY MEDICINE

## 2023-06-27 PROCEDURE — 1159F MED LIST DOCD IN RCRD: CPT | Performed by: FAMILY MEDICINE

## 2023-06-27 PROCEDURE — 86038 ANTINUCLEAR ANTIBODIES: CPT

## 2023-06-27 RX ORDER — PLECANATIDE 3 MG/1
3 TABLET ORAL 2 TIMES DAILY
Qty: 60 TABLET | Refills: 2 | COMMUNITY
Start: 2023-05-22 | End: 2023-08-20

## 2023-06-27 ASSESSMENT — ENCOUNTER SYMPTOMS
MYALGIAS: 1
BACK PAIN: 1
LOSS OF SENSATION IN FEET: 0
DEPRESSION: 0
OCCASIONAL FEELINGS OF UNSTEADINESS: 0
CONSTITUTIONAL NEGATIVE: 1
ARTHRALGIAS: 1

## 2023-06-27 ASSESSMENT — PAIN SCALES - GENERAL: PAINLEVEL: 3

## 2023-06-27 NOTE — PROGRESS NOTES
"Subjective   Patient ID: Gabby Madrid is a 73 y.o. female who presents for c/o  (C/o right eye issues and joint issues).    HPI     Review of Systems   Constitutional: Negative.    Musculoskeletal:  Positive for arthralgias, back pain and myalgias.       Objective   /78 (BP Location: Right arm)   Temp 36.4 °C (97.6 °F) (Temporal)   Ht 1.676 m (5' 6\")   Wt 69.7 kg (153 lb 9.6 oz)   BMI 24.79 kg/m²     Physical Exam  Vitals and nursing note reviewed.   Musculoskeletal:      Comments: DJD multiple joints   Neurological:      Mental Status: She is alert.         Assessment/Plan   Problem List Items Addressed This Visit          Nervous    Lumbar stenosis    Chronic pain disorder - Primary       Musculoskeletal    Generalized osteoarthritis of multiple sites    Relevant Orders    HADLEY with Reflex to FELIX    Sedimentation Rate    Rheumatoid factor          "

## 2023-06-28 LAB — ANTI-NUCLEAR ANTIBODY (ANA): NEGATIVE

## 2023-07-08 DIAGNOSIS — E78.00 HYPERCHOLESTEROLEMIA: Primary | ICD-10-CM

## 2023-07-10 DIAGNOSIS — G47.00 INSOMNIA, UNSPECIFIED TYPE: Primary | ICD-10-CM

## 2023-07-10 RX ORDER — ATORVASTATIN CALCIUM 20 MG/1
TABLET, FILM COATED ORAL
Qty: 45 TABLET | Refills: 1 | Status: SHIPPED | OUTPATIENT
Start: 2023-07-10 | End: 2024-02-01

## 2023-07-12 RX ORDER — TRAZODONE HYDROCHLORIDE 50 MG/1
TABLET ORAL
Qty: 60 TABLET | Refills: 3 | Status: SHIPPED | OUTPATIENT
Start: 2023-07-12 | End: 2023-11-07

## 2023-07-22 DIAGNOSIS — I10 ESSENTIAL HYPERTENSION: Primary | ICD-10-CM

## 2023-07-24 RX ORDER — AMLODIPINE BESYLATE 5 MG/1
5 TABLET ORAL DAILY
Qty: 90 TABLET | Refills: 1 | Status: SHIPPED | OUTPATIENT
Start: 2023-07-24 | End: 2024-02-29

## 2023-09-28 DIAGNOSIS — G89.4 CHRONIC PAIN SYNDROME: Primary | ICD-10-CM

## 2023-09-28 RX ORDER — DULOXETIN HYDROCHLORIDE 60 MG/1
60 CAPSULE, DELAYED RELEASE ORAL DAILY
Qty: 90 CAPSULE | Refills: 3 | Status: SHIPPED | OUTPATIENT
Start: 2023-09-28

## 2023-10-16 ENCOUNTER — TELEPHONE (OUTPATIENT)
Dept: PRIMARY CARE | Facility: CLINIC | Age: 73
End: 2023-10-16
Payer: MEDICARE

## 2023-10-16 DIAGNOSIS — J01.90 ACUTE NON-RECURRENT SINUSITIS, UNSPECIFIED LOCATION: Primary | ICD-10-CM

## 2023-10-16 RX ORDER — BENZONATATE 200 MG/1
200 CAPSULE ORAL 3 TIMES DAILY PRN
Qty: 42 CAPSULE | Refills: 0 | Status: SHIPPED | OUTPATIENT
Start: 2023-10-16 | End: 2023-11-15

## 2023-10-16 RX ORDER — SULFAMETHOXAZOLE AND TRIMETHOPRIM 800; 160 MG/1; MG/1
1 TABLET ORAL 2 TIMES DAILY
Qty: 20 TABLET | Refills: 0 | Status: SHIPPED | OUTPATIENT
Start: 2023-10-16 | End: 2023-10-26

## 2023-11-06 DIAGNOSIS — G47.00 INSOMNIA, UNSPECIFIED TYPE: ICD-10-CM

## 2023-11-07 PROBLEM — N13.9 OBSTRUCTIVE UROPATHY: Status: ACTIVE | Noted: 2023-11-07

## 2023-11-07 PROBLEM — R79.89 OTHER SPECIFIED ABNORMAL FINDINGS OF BLOOD CHEMISTRY: Status: ACTIVE | Noted: 2023-11-07

## 2023-11-07 PROBLEM — N20.1 CALCULUS OF URETER: Status: ACTIVE | Noted: 2023-11-07

## 2023-11-07 PROBLEM — R51.9 HEADACHE: Status: ACTIVE | Noted: 2023-11-07

## 2023-11-07 PROBLEM — N20.0 CALCULUS OF RIGHT KIDNEY: Status: ACTIVE | Noted: 2023-11-07

## 2023-11-07 PROBLEM — K57.90 DIVERTICULOSIS: Status: ACTIVE | Noted: 2023-11-07

## 2023-11-07 PROBLEM — R10.9 ABDOMINAL PAIN: Status: ACTIVE | Noted: 2023-11-07

## 2023-11-07 PROBLEM — N39.0 UTI (URINARY TRACT INFECTION): Status: ACTIVE | Noted: 2023-11-07

## 2023-11-07 PROBLEM — M96.1 CERVICAL POSTLAMINECTOMY SYNDROME: Status: ACTIVE | Noted: 2023-06-21

## 2023-11-07 PROBLEM — E78.5 HYPERLIPOPROTEINEMIA: Status: ACTIVE | Noted: 2023-11-07

## 2023-11-07 PROBLEM — M46.1 SACROILIITIS (CMS-HCC): Status: ACTIVE | Noted: 2023-06-21

## 2023-11-07 PROBLEM — M96.1 POSTLAMINECTOMY SYNDROME OF LUMBAR REGION: Status: ACTIVE | Noted: 2023-11-07

## 2023-11-07 PROBLEM — R07.89 CHEST PAIN, ATYPICAL: Status: ACTIVE | Noted: 2023-11-07

## 2023-11-07 RX ORDER — TRAZODONE HYDROCHLORIDE 50 MG/1
TABLET ORAL
Qty: 60 TABLET | Refills: 10 | Status: SHIPPED | OUTPATIENT
Start: 2023-11-07

## 2023-11-09 ENCOUNTER — TELEPHONE (OUTPATIENT)
Dept: PRIMARY CARE | Facility: CLINIC | Age: 73
End: 2023-11-09
Payer: MEDICARE

## 2023-11-09 NOTE — TELEPHONE ENCOUNTER
Gabby is scheduled for a Nurse Visit 11/13/23 for a Pneumonia shot. Is she due for one and would she receive the [id number]?  It looks like she had received the [id number] back in 2021

## 2023-11-13 ENCOUNTER — CLINICAL SUPPORT (OUTPATIENT)
Dept: PRIMARY CARE | Facility: CLINIC | Age: 73
End: 2023-11-13
Payer: MEDICARE

## 2023-11-13 PROCEDURE — G0009 ADMIN PNEUMOCOCCAL VACCINE: HCPCS | Performed by: FAMILY MEDICINE

## 2023-11-13 PROCEDURE — 90677 PCV20 VACCINE IM: CPT | Performed by: FAMILY MEDICINE

## 2024-02-01 DIAGNOSIS — E78.00 HYPERCHOLESTEROLEMIA: ICD-10-CM

## 2024-02-01 RX ORDER — ATORVASTATIN CALCIUM 20 MG/1
TABLET, FILM COATED ORAL
Qty: 45 TABLET | Refills: 3 | Status: SHIPPED | OUTPATIENT
Start: 2024-02-01

## 2024-02-01 RX ORDER — PREGABALIN 150 MG/1
150 CAPSULE ORAL EVERY 12 HOURS
COMMUNITY
Start: 2023-12-12

## 2024-02-01 RX ORDER — TENAPANOR HYDROCHLORIDE 53.2 MG/1
TABLET ORAL 2 TIMES DAILY
COMMUNITY
Start: 2024-01-22 | End: 2024-07-20

## 2024-02-23 ENCOUNTER — TELEPHONE (OUTPATIENT)
Dept: PRIMARY CARE | Facility: CLINIC | Age: 74
End: 2024-02-23
Payer: MEDICARE

## 2024-02-23 DIAGNOSIS — J01.90 ACUTE NON-RECURRENT SINUSITIS, UNSPECIFIED LOCATION: Primary | ICD-10-CM

## 2024-02-23 RX ORDER — AMOXICILLIN AND CLAVULANATE POTASSIUM 500; 125 MG/1; MG/1
500 TABLET, FILM COATED ORAL 2 TIMES DAILY
Qty: 20 TABLET | Refills: 0 | Status: SHIPPED | OUTPATIENT
Start: 2024-02-23 | End: 2024-03-04

## 2024-02-26 DIAGNOSIS — E11.8 CONTROLLED TYPE 2 DIABETES MELLITUS WITH COMPLICATION, WITHOUT LONG-TERM CURRENT USE OF INSULIN (MULTI): Primary | ICD-10-CM

## 2024-02-26 RX ORDER — BLOOD-GLUCOSE METER
1 EACH MISCELLANEOUS DAILY
Qty: 100 STRIP | Refills: 3 | Status: SHIPPED | OUTPATIENT
Start: 2024-02-26 | End: 2025-02-25

## 2024-02-29 DIAGNOSIS — I10 ESSENTIAL HYPERTENSION: ICD-10-CM

## 2024-02-29 RX ORDER — AMLODIPINE BESYLATE 5 MG/1
5 TABLET ORAL DAILY
Qty: 90 TABLET | Refills: 3 | Status: SHIPPED | OUTPATIENT
Start: 2024-02-29

## 2024-02-29 RX ORDER — POLYETHYLENE GLYCOL-3350 AND ELECTROLYTES 236; 6.74; 5.86; 2.97; 22.74 G/274.31G; G/274.31G; G/274.31G; G/274.31G; G/274.31G
POWDER, FOR SOLUTION ORAL
COMMUNITY
Start: 2024-02-27

## 2024-03-05 ENCOUNTER — TELEPHONE (OUTPATIENT)
Dept: PRIMARY CARE | Facility: CLINIC | Age: 74
End: 2024-03-05
Payer: MEDICARE

## 2024-03-05 DIAGNOSIS — E11.8 CONTROLLED TYPE 2 DIABETES MELLITUS WITH COMPLICATION, WITHOUT LONG-TERM CURRENT USE OF INSULIN (MULTI): Primary | ICD-10-CM

## 2024-03-05 RX ORDER — DEXTROSE 4 G
TABLET,CHEWABLE ORAL
Qty: 1 EACH | Refills: 0 | Status: SHIPPED | OUTPATIENT
Start: 2024-03-05

## 2024-04-29 ENCOUNTER — LAB (OUTPATIENT)
Dept: LAB | Facility: LAB | Age: 74
End: 2024-04-29
Payer: MEDICARE

## 2024-04-29 ENCOUNTER — OFFICE VISIT (OUTPATIENT)
Dept: PRIMARY CARE | Facility: CLINIC | Age: 74
End: 2024-04-29
Payer: MEDICARE

## 2024-04-29 VITALS
OXYGEN SATURATION: 98 % | HEART RATE: 73 BPM | HEIGHT: 66 IN | DIASTOLIC BLOOD PRESSURE: 80 MMHG | TEMPERATURE: 98.1 F | BODY MASS INDEX: 25.58 KG/M2 | WEIGHT: 159.2 LBS | SYSTOLIC BLOOD PRESSURE: 140 MMHG

## 2024-04-29 DIAGNOSIS — M31.6 TEMPORAL ARTERITIS (MULTI): ICD-10-CM

## 2024-04-29 DIAGNOSIS — E11.8 CONTROLLED TYPE 2 DIABETES MELLITUS WITH COMPLICATION, WITHOUT LONG-TERM CURRENT USE OF INSULIN (MULTI): ICD-10-CM

## 2024-04-29 DIAGNOSIS — M31.6 TEMPORAL ARTERITIS (MULTI): Primary | ICD-10-CM

## 2024-04-29 DIAGNOSIS — Z12.31 SCREENING MAMMOGRAM FOR BREAST CANCER: ICD-10-CM

## 2024-04-29 DIAGNOSIS — I10 ESSENTIAL HYPERTENSION: ICD-10-CM

## 2024-04-29 LAB — ERYTHROCYTE [SEDIMENTATION RATE] IN BLOOD BY WESTERGREN METHOD: 5 MM/H (ref 0–30)

## 2024-04-29 PROCEDURE — 99213 OFFICE O/P EST LOW 20 MIN: CPT | Performed by: FAMILY MEDICINE

## 2024-04-29 PROCEDURE — 85652 RBC SED RATE AUTOMATED: CPT

## 2024-04-29 PROCEDURE — 1125F AMNT PAIN NOTED PAIN PRSNT: CPT | Performed by: FAMILY MEDICINE

## 2024-04-29 PROCEDURE — 1159F MED LIST DOCD IN RCRD: CPT | Performed by: FAMILY MEDICINE

## 2024-04-29 PROCEDURE — 3077F SYST BP >= 140 MM HG: CPT | Performed by: FAMILY MEDICINE

## 2024-04-29 PROCEDURE — 36415 COLL VENOUS BLD VENIPUNCTURE: CPT

## 2024-04-29 PROCEDURE — 1160F RVW MEDS BY RX/DR IN RCRD: CPT | Performed by: FAMILY MEDICINE

## 2024-04-29 PROCEDURE — 3079F DIAST BP 80-89 MM HG: CPT | Performed by: FAMILY MEDICINE

## 2024-04-29 PROCEDURE — 1036F TOBACCO NON-USER: CPT | Performed by: FAMILY MEDICINE

## 2024-04-29 ASSESSMENT — ENCOUNTER SYMPTOMS
CONSTITUTIONAL NEGATIVE: 1
EYE PAIN: 1
LOSS OF SENSATION IN FEET: 0
OCCASIONAL FEELINGS OF UNSTEADINESS: 0
HEADACHES: 1
DEPRESSION: 0

## 2024-04-29 ASSESSMENT — PAIN SCALES - GENERAL: PAINLEVEL: 6

## 2024-04-29 NOTE — PROGRESS NOTES
"Subjective   Patient ID: Gabby Madrid is a 74 y.o. female who presents for misc (C/o left eye problems x 2wks).    HPI     Review of Systems   Constitutional: Negative.    Eyes:  Positive for pain and visual disturbance.        Saw optometry dry eyes   Neurological:  Positive for headaches.       Objective   /80 (BP Location: Left arm)   Pulse 73   Temp 36.7 °C (98.1 °F) (Temporal)   Ht 1.676 m (5' 6\")   Wt 72.2 kg (159 lb 3.2 oz)   SpO2 98%   BMI 25.70 kg/m²     Physical Exam  Vitals reviewed.   Constitutional:       Appearance: Normal appearance.   HENT:      Right Ear: Tympanic membrane normal.      Left Ear: Tympanic membrane normal.      Mouth/Throat:      Pharynx: Oropharynx is clear.   Cardiovascular:      Rate and Rhythm: Normal rate and regular rhythm.      Heart sounds: Normal heart sounds.   Neurological:      Mental Status: She is alert and oriented to person, place, and time.   Psychiatric:         Behavior: Behavior normal.         Assessment/Plan patient seen here after seeing her optometrist she had a normal eye exam with her she wants her checked for temporal arteritis we are getting a sed rate today let her know the results  Problem List Items Addressed This Visit             ICD-10-CM    Diabetes mellitus type 2, controlled (Multi) E11.9    Essential hypertension I10     Other Visit Diagnoses         Codes    Temporal arteritis (Multi)    -  Primary M31.6    Relevant Orders    Sedimentation Rate               "

## 2024-05-09 ENCOUNTER — NURSE TRIAGE (OUTPATIENT)
Dept: PRIMARY CARE | Facility: CLINIC | Age: 74
End: 2024-05-09
Payer: MEDICARE

## 2024-05-28 ENCOUNTER — HOSPITAL ENCOUNTER (OUTPATIENT)
Dept: RADIOLOGY | Facility: HOSPITAL | Age: 74
Discharge: HOME | End: 2024-05-28
Payer: MEDICARE

## 2024-05-28 DIAGNOSIS — C64.1 MALIGNANT NEOPLASM OF RIGHT KIDNEY, EXCEPT RENAL PELVIS (MULTI): ICD-10-CM

## 2024-05-28 PROCEDURE — 74183 MRI ABD W/O CNTR FLWD CNTR: CPT | Performed by: STUDENT IN AN ORGANIZED HEALTH CARE EDUCATION/TRAINING PROGRAM

## 2024-05-28 PROCEDURE — 2550000001 HC RX 255 CONTRASTS: Performed by: STUDENT IN AN ORGANIZED HEALTH CARE EDUCATION/TRAINING PROGRAM

## 2024-05-28 PROCEDURE — A9575 INJ GADOTERATE MEGLUMI 0.1ML: HCPCS | Performed by: STUDENT IN AN ORGANIZED HEALTH CARE EDUCATION/TRAINING PROGRAM

## 2024-05-28 PROCEDURE — 74183 MRI ABD W/O CNTR FLWD CNTR: CPT

## 2024-05-28 RX ORDER — GADOTERATE MEGLUMINE 376.9 MG/ML
14 INJECTION INTRAVENOUS
Status: COMPLETED | OUTPATIENT
Start: 2024-05-28 | End: 2024-05-28

## 2024-05-28 RX ADMIN — GADOTERATE MEGLUMINE 14 ML: 376.9 INJECTION INTRAVENOUS at 13:35

## 2024-06-03 ENCOUNTER — APPOINTMENT (OUTPATIENT)
Dept: RADIOLOGY | Facility: HOSPITAL | Age: 74
End: 2024-06-03
Payer: MEDICARE

## 2024-06-05 DIAGNOSIS — K21.9 GERD WITHOUT ESOPHAGITIS: ICD-10-CM

## 2024-06-05 PROBLEM — M54.32 SCIATICA OF LEFT SIDE: Status: ACTIVE | Noted: 2024-04-01

## 2024-06-05 PROBLEM — E11.40 NEUROPATHY DUE TO TYPE 2 DIABETES MELLITUS (MULTI): Status: ACTIVE | Noted: 2024-06-05

## 2024-06-05 PROBLEM — R19.5 NONSPECIFIC ABNORMAL FINDING IN STOOL CONTENTS: Status: ACTIVE | Noted: 2021-12-06

## 2024-06-05 PROBLEM — Z86.39 HISTORY OF TYPE 2 DIABETES MELLITUS: Status: ACTIVE | Noted: 2024-06-05

## 2024-06-05 PROBLEM — M51.369 LUMBAR DEGENERATIVE DISC DISEASE: Status: ACTIVE | Noted: 2024-04-01

## 2024-06-05 PROBLEM — Z86.79 HISTORY OF HYPERTENSION: Status: ACTIVE | Noted: 2024-06-05

## 2024-06-05 PROBLEM — M75.40 IMPINGEMENT SYNDROME OF SHOULDER REGION: Status: ACTIVE | Noted: 2024-06-05

## 2024-06-05 PROBLEM — M51.36 LUMBAR DEGENERATIVE DISC DISEASE: Status: ACTIVE | Noted: 2024-04-01

## 2024-06-05 RX ORDER — VALACYCLOVIR HYDROCHLORIDE 500 MG/1
TABLET, FILM COATED ORAL EVERY 12 HOURS
COMMUNITY
Start: 2022-10-04

## 2024-06-11 ENCOUNTER — APPOINTMENT (OUTPATIENT)
Dept: UROLOGY | Facility: HOSPITAL | Age: 74
End: 2024-06-11
Payer: MEDICARE

## 2024-06-11 RX ORDER — CYANOCOBALAMIN 1000 UG/ML
INJECTION, SOLUTION INTRAMUSCULAR; SUBCUTANEOUS
Qty: 1 ML | Refills: 0 | Status: SHIPPED | OUTPATIENT
Start: 2024-06-11

## 2024-06-12 DIAGNOSIS — R53.83 OTHER FATIGUE: ICD-10-CM

## 2024-06-13 RX ORDER — CYANOCOBALAMIN, ISOPROPYL ALCOHOL 1000MCG/ML
1 KIT INJECTION
Qty: 1 KIT | Refills: 1 | Status: SHIPPED | OUTPATIENT
Start: 2024-06-13

## 2024-07-02 ENCOUNTER — OFFICE VISIT (OUTPATIENT)
Dept: UROLOGY | Facility: HOSPITAL | Age: 74
End: 2024-07-02
Payer: MEDICARE

## 2024-07-02 VITALS
OXYGEN SATURATION: 97 % | HEART RATE: 71 BPM | TEMPERATURE: 98.1 F | BODY MASS INDEX: 25.09 KG/M2 | WEIGHT: 155.42 LBS | RESPIRATION RATE: 22 BRPM | SYSTOLIC BLOOD PRESSURE: 139 MMHG | DIASTOLIC BLOOD PRESSURE: 64 MMHG

## 2024-07-02 DIAGNOSIS — N28.89 RIGHT KIDNEY MASS: Primary | ICD-10-CM

## 2024-07-02 PROCEDURE — 1036F TOBACCO NON-USER: CPT | Performed by: STUDENT IN AN ORGANIZED HEALTH CARE EDUCATION/TRAINING PROGRAM

## 2024-07-02 PROCEDURE — 99214 OFFICE O/P EST MOD 30 MIN: CPT | Performed by: STUDENT IN AN ORGANIZED HEALTH CARE EDUCATION/TRAINING PROGRAM

## 2024-07-02 PROCEDURE — 1126F AMNT PAIN NOTED NONE PRSNT: CPT | Performed by: STUDENT IN AN ORGANIZED HEALTH CARE EDUCATION/TRAINING PROGRAM

## 2024-07-02 PROCEDURE — 3078F DIAST BP <80 MM HG: CPT | Performed by: STUDENT IN AN ORGANIZED HEALTH CARE EDUCATION/TRAINING PROGRAM

## 2024-07-02 PROCEDURE — 3075F SYST BP GE 130 - 139MM HG: CPT | Performed by: STUDENT IN AN ORGANIZED HEALTH CARE EDUCATION/TRAINING PROGRAM

## 2024-07-02 PROCEDURE — G2211 COMPLEX E/M VISIT ADD ON: HCPCS | Performed by: STUDENT IN AN ORGANIZED HEALTH CARE EDUCATION/TRAINING PROGRAM

## 2024-07-02 ASSESSMENT — PAIN SCALES - GENERAL: PAINLEVEL: 0-NO PAIN

## 2024-07-02 NOTE — PROGRESS NOTES
Subjective     Gabby Madrid is a 74 y.o. female with an endophytic R renal mass s/p URS with stent placement who presents for 1 year FUV.     This was initially thought to be a potential urothelial carcinoma. I took her for URS in Feb 2022. I did not find a tumour within the renal collecting system but I did find a stone which I removed. She subsequently had stone surgery with Dr. Flores for an obstructing acute stone. The stone was calcium based 70% CaOx 30% CaP. She is a 12x stone former. Has done Litholink in the past.     MR Abd renal 5/2024 showed an endophytic lower pole renal mass measuring 2.5cm x2.2cm x 1.9cm. Previously this measured 1.8cm, 1.5cm and 2.5cm.     Past medical, surgical, family and social history were reviewed and unchanged since last visit besides what is in the HPI.            Review of Systems   All other systems reviewed and are negative.      Objective   Physical Exam  Gen: No acute distress     Psych: Alert and oriented x3     Neuro:  Normal ROM    Resp: Nonlabored respirations     CV: Regular rate and rhythm     Abd: S, NT, ND.    : Deferred    Skin: Warm, dry and intact without rashes     Lymphatics: No peripheral edema       Assessment/Plan   Problem List Items Addressed This Visit             ICD-10-CM    Right kidney mass - Primary N28.89     I discussed the patients presentation, history and imaging results in detail with the patient today.  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  These is no evidence of metastasis.  There is no evidence of renal vein invasion.  I discussed how staging imaging of the chest, bones and brain is sometimes obtained as well.    We went over the treatment options including:    Active surveillance  Renal mass biopsy  Renal mass biopsy and ablative therapy  Surgical therapy    I explained  that the treatment options for localized kidney cancer are predominantly surgical with either radical or partial nephrectomy.  Given the location of this mass, it may be difficult to treat with a robotic partial nephrectomy. There is increased risk of conversion to open and/or radical nephrectomy.     I explained to the patient the risks of surgery including bleeding requiring transfusion, infection, injury to surrounding structures (colon, small intestine, spleen, liver and pancreas), recurrence, need for further procedures, wound infection, failure to cure, need for further procedures and cardiopulmonary events.  I would like the patient to see PAT prior to surgery for pre-operative clearance.      We discussed continued surveillance with repeat MRI renal q 6 months.  The patient understands and agrees with this plan.                    Plan:  6 month FUV with MR Abd renal with continued active surveillance.        Scribe Attestation  By signing my name below, I, Katelyn Casalla, Scribe   attest that this documentation has been prepared under the direction and in the presence of Tesfaye Adam MD MPH.

## 2024-07-02 NOTE — ASSESSMENT & PLAN NOTE
I discussed the patients presentation, history and imaging results in detail with the patient today.  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  These is no evidence of metastasis.  There is no evidence of renal vein invasion.  I discussed how staging imaging of the chest, bones and brain is sometimes obtained as well.    We went over the treatment options including:    Active surveillance  Renal mass biopsy  Renal mass biopsy and ablative therapy  Surgical therapy    I explained that the treatment options for localized kidney cancer are predominantly surgical with either radical or partial nephrectomy.  Given the location of this mass, it may be difficult to treat with a robotic partial nephrectomy. There is increased risk of conversion to open and/or radical nephrectomy.     I explained to the patient the risks of surgery including bleeding requiring transfusion, infection, injury to surrounding structures (colon, small intestine, spleen, liver and pancreas), recurrence, need for further procedures, wound infection, failure to cure, need for further procedures and cardiopulmonary events.  I would like the patient to see PAT prior to surgery for pre-operative clearance.      We discussed continued surveillance with repeat MRI renal q 6 months.  The patient understands and agrees with this plan.

## 2024-08-05 ENCOUNTER — APPOINTMENT (OUTPATIENT)
Dept: PRIMARY CARE | Facility: CLINIC | Age: 74
End: 2024-08-05
Payer: MEDICARE

## 2024-08-05 ENCOUNTER — LAB (OUTPATIENT)
Dept: LAB | Facility: LAB | Age: 74
End: 2024-08-05
Payer: MEDICARE

## 2024-08-05 VITALS
HEIGHT: 66 IN | BODY MASS INDEX: 25.58 KG/M2 | DIASTOLIC BLOOD PRESSURE: 72 MMHG | OXYGEN SATURATION: 97 % | HEART RATE: 61 BPM | WEIGHT: 159.2 LBS | SYSTOLIC BLOOD PRESSURE: 120 MMHG | TEMPERATURE: 97.5 F

## 2024-08-05 DIAGNOSIS — E78.00 HYPERCHOLESTEROLEMIA: ICD-10-CM

## 2024-08-05 DIAGNOSIS — B18.2 CHRONIC HEPATITIS C WITHOUT HEPATIC COMA (MULTI): ICD-10-CM

## 2024-08-05 DIAGNOSIS — Z00.00 MEDICARE ANNUAL WELLNESS VISIT, SUBSEQUENT: Primary | ICD-10-CM

## 2024-08-05 DIAGNOSIS — G91.2 (IDIOPATHIC) NORMAL PRESSURE HYDROCEPHALUS (MULTI): ICD-10-CM

## 2024-08-05 DIAGNOSIS — E11.8 CONTROLLED TYPE 2 DIABETES MELLITUS WITH COMPLICATION, WITHOUT LONG-TERM CURRENT USE OF INSULIN (MULTI): ICD-10-CM

## 2024-08-05 DIAGNOSIS — E11.40 NEUROPATHY DUE TO TYPE 2 DIABETES MELLITUS (MULTI): ICD-10-CM

## 2024-08-05 DIAGNOSIS — I10 ESSENTIAL HYPERTENSION: ICD-10-CM

## 2024-08-05 DIAGNOSIS — M51.36 LUMBAR DEGENERATIVE DISC DISEASE: ICD-10-CM

## 2024-08-05 DIAGNOSIS — C64.1 RENAL CELL CARCINOMA OF RIGHT KIDNEY (MULTI): ICD-10-CM

## 2024-08-05 DIAGNOSIS — I25.118 CORONARY ARTERY DISEASE INVOLVING NATIVE CORONARY ARTERY OF NATIVE HEART WITH OTHER FORM OF ANGINA PECTORIS (CMS-HCC): ICD-10-CM

## 2024-08-05 DIAGNOSIS — B35.9 TINEA: Primary | ICD-10-CM

## 2024-08-05 LAB
ALBUMIN SERPL BCP-MCNC: 4.1 G/DL (ref 3.4–5)
ALP SERPL-CCNC: 68 U/L (ref 33–136)
ALT SERPL W P-5'-P-CCNC: 19 U/L (ref 7–45)
ANION GAP SERPL CALC-SCNC: 15 MMOL/L (ref 10–20)
AST SERPL W P-5'-P-CCNC: 23 U/L (ref 9–39)
BASOPHILS # BLD AUTO: 0.07 X10*3/UL (ref 0–0.1)
BASOPHILS NFR BLD AUTO: 0.9 %
BILIRUB SERPL-MCNC: 0.9 MG/DL (ref 0–1.2)
BUN SERPL-MCNC: 14 MG/DL (ref 6–23)
CALCIUM SERPL-MCNC: 9.4 MG/DL (ref 8.6–10.6)
CHLORIDE SERPL-SCNC: 102 MMOL/L (ref 98–107)
CHOLEST SERPL-MCNC: 166 MG/DL (ref 0–199)
CHOLESTEROL/HDL RATIO: 3.5
CO2 SERPL-SCNC: 32 MMOL/L (ref 21–32)
CREAT SERPL-MCNC: 0.83 MG/DL (ref 0.5–1.05)
EGFRCR SERPLBLD CKD-EPI 2021: 75 ML/MIN/1.73M*2
EOSINOPHIL # BLD AUTO: 0.37 X10*3/UL (ref 0–0.4)
EOSINOPHIL NFR BLD AUTO: 4.8 %
ERYTHROCYTE [DISTWIDTH] IN BLOOD BY AUTOMATED COUNT: 14.4 % (ref 11.5–14.5)
EST. AVERAGE GLUCOSE BLD GHB EST-MCNC: 134 MG/DL
GLUCOSE SERPL-MCNC: 105 MG/DL (ref 74–99)
HBA1C MFR BLD: 6.3 %
HCT VFR BLD AUTO: 45.6 % (ref 36–46)
HDLC SERPL-MCNC: 47.5 MG/DL
HGB BLD-MCNC: 14.6 G/DL (ref 12–16)
IMM GRANULOCYTES # BLD AUTO: 0.05 X10*3/UL (ref 0–0.5)
IMM GRANULOCYTES NFR BLD AUTO: 0.6 % (ref 0–0.9)
LDLC SERPL CALC-MCNC: 87 MG/DL
LYMPHOCYTES # BLD AUTO: 2.47 X10*3/UL (ref 0.8–3)
LYMPHOCYTES NFR BLD AUTO: 31.9 %
MCH RBC QN AUTO: 30.9 PG (ref 26–34)
MCHC RBC AUTO-ENTMCNC: 32 G/DL (ref 32–36)
MCV RBC AUTO: 96 FL (ref 80–100)
MONOCYTES # BLD AUTO: 0.73 X10*3/UL (ref 0.05–0.8)
MONOCYTES NFR BLD AUTO: 9.4 %
NEUTROPHILS # BLD AUTO: 4.06 X10*3/UL (ref 1.6–5.5)
NEUTROPHILS NFR BLD AUTO: 52.4 %
NON HDL CHOLESTEROL: 119 MG/DL (ref 0–149)
NRBC BLD-RTO: 0 /100 WBCS (ref 0–0)
PLATELET # BLD AUTO: 242 X10*3/UL (ref 150–450)
POTASSIUM SERPL-SCNC: 4 MMOL/L (ref 3.5–5.3)
PROT SERPL-MCNC: 6.2 G/DL (ref 6.4–8.2)
RBC # BLD AUTO: 4.73 X10*6/UL (ref 4–5.2)
SODIUM SERPL-SCNC: 145 MMOL/L (ref 136–145)
TRIGL SERPL-MCNC: 156 MG/DL (ref 0–149)
VLDL: 31 MG/DL (ref 0–40)
WBC # BLD AUTO: 7.8 X10*3/UL (ref 4.4–11.3)

## 2024-08-05 PROCEDURE — 80053 COMPREHEN METABOLIC PANEL: CPT

## 2024-08-05 PROCEDURE — G0439 PPPS, SUBSEQ VISIT: HCPCS | Performed by: FAMILY MEDICINE

## 2024-08-05 PROCEDURE — 1159F MED LIST DOCD IN RCRD: CPT | Performed by: FAMILY MEDICINE

## 2024-08-05 PROCEDURE — 1170F FXNL STATUS ASSESSED: CPT | Performed by: FAMILY MEDICINE

## 2024-08-05 PROCEDURE — 36415 COLL VENOUS BLD VENIPUNCTURE: CPT

## 2024-08-05 PROCEDURE — 3074F SYST BP LT 130 MM HG: CPT | Performed by: FAMILY MEDICINE

## 2024-08-05 PROCEDURE — 99214 OFFICE O/P EST MOD 30 MIN: CPT | Performed by: FAMILY MEDICINE

## 2024-08-05 PROCEDURE — 1124F ACP DISCUSS-NO DSCNMKR DOCD: CPT | Performed by: FAMILY MEDICINE

## 2024-08-05 PROCEDURE — 1036F TOBACCO NON-USER: CPT | Performed by: FAMILY MEDICINE

## 2024-08-05 PROCEDURE — 1125F AMNT PAIN NOTED PAIN PRSNT: CPT | Performed by: FAMILY MEDICINE

## 2024-08-05 PROCEDURE — 83036 HEMOGLOBIN GLYCOSYLATED A1C: CPT

## 2024-08-05 PROCEDURE — 1160F RVW MEDS BY RX/DR IN RCRD: CPT | Performed by: FAMILY MEDICINE

## 2024-08-05 PROCEDURE — 3008F BODY MASS INDEX DOCD: CPT | Performed by: FAMILY MEDICINE

## 2024-08-05 PROCEDURE — 80061 LIPID PANEL: CPT

## 2024-08-05 PROCEDURE — 85025 COMPLETE CBC W/AUTO DIFF WBC: CPT

## 2024-08-05 PROCEDURE — 99497 ADVNCD CARE PLAN 30 MIN: CPT | Performed by: FAMILY MEDICINE

## 2024-08-05 PROCEDURE — 3078F DIAST BP <80 MM HG: CPT | Performed by: FAMILY MEDICINE

## 2024-08-05 RX ORDER — NYSTATIN 100000 [USP'U]/G
1 POWDER TOPICAL 2 TIMES DAILY
Qty: 60 G | Refills: 2 | Status: SHIPPED | OUTPATIENT
Start: 2024-08-05 | End: 2025-08-05

## 2024-08-05 ASSESSMENT — ACTIVITIES OF DAILY LIVING (ADL)
JUDGMENT_ADEQUATE_SAFELY_COMPLETE_DAILY_ACTIVITIES: YES
PREPARING MEALS: INDEPENDENT
WALKS IN HOME: INDEPENDENT
PATIENT'S MEMORY ADEQUATE TO SAFELY COMPLETE DAILY ACTIVITIES?: YES
NEEDS ASSISTANCE WITH FOOD: INDEPENDENT
MANAGING FINANCES: INDEPENDENT
STIL DRIVING: YES
USING TELEPHONE: INDEPENDENT
DOING HOUSEWORK: INDEPENDENT
DRESSING: INDEPENDENT
GROOMING: INDEPENDENT
TOILETING: INDEPENDENT
TAKING MEDICATION: INDEPENDENT
GROCERY SHOPPING: INDEPENDENT
EATING: INDEPENDENT
ADEQUATE_TO_COMPLETE_ADL: YES
USING TRANSPORTATION: INDEPENDENT
FEEDING YOURSELF: INDEPENDENT
BATHING: INDEPENDENT

## 2024-08-05 ASSESSMENT — ANXIETY QUESTIONNAIRES
5. BEING SO RESTLESS THAT IT IS HARD TO SIT STILL: NOT AT ALL
7. FEELING AFRAID AS IF SOMETHING AWFUL MIGHT HAPPEN: NOT AT ALL
GAD7 TOTAL SCORE: 0
6. BECOMING EASILY ANNOYED OR IRRITABLE: NOT AT ALL
2. NOT BEING ABLE TO STOP OR CONTROL WORRYING: NOT AT ALL
4. TROUBLE RELAXING: NOT AT ALL
1. FEELING NERVOUS, ANXIOUS, OR ON EDGE: NOT AT ALL
3. WORRYING TOO MUCH ABOUT DIFFERENT THINGS: NOT AT ALL

## 2024-08-05 ASSESSMENT — ENCOUNTER SYMPTOMS
PSYCHIATRIC NEGATIVE: 1
CARDIOVASCULAR NEGATIVE: 1
DEPRESSION: 0
RESPIRATORY NEGATIVE: 1
GASTROINTESTINAL NEGATIVE: 1
BACK PAIN: 1
OCCASIONAL FEELINGS OF UNSTEADINESS: 0
ENDOCRINE NEGATIVE: 1
CONSTITUTIONAL NEGATIVE: 1
LOSS OF SENSATION IN FEET: 0

## 2024-08-05 ASSESSMENT — GERIATRIC MINI NUTRITIONAL ASSESSMENT (MNA)
E NEUROPSYCHOLOGICAL PROBLEMS: NO PSYCHOLOGICAL PROBLEMS
D HAS SUFFERED PSYCHOLOGICAL STRESS OR ACUTE DISEASE IN THE PAST 3 MONTHS?: NO
C GENERAL MOBILITY: GOES OUT
A HAS FOOD INTAKE DECLINED OVER THE PAST 3 MONTHS DUE TO LOSS OF APPETITE, DIGESTIVE PROBLEMS, CHEWING OR SWALLOWING DIFFICULTIES?: NO DECREASE IN FOOD INTAKE
B WEIGHT LOSS DURING THE LAST 3 MONTHS: NO WEIGHT LOSS

## 2024-08-05 ASSESSMENT — PAIN SCALES - GENERAL: PAINLEVEL: 5

## 2024-08-05 NOTE — PROGRESS NOTES
"Subjective   Patient ID: Gabby Madrid is a 74 y.o. female who presents for Annual Exam (Assessment annual medicare wellness fasting bw).    HPI     Review of Systems   Constitutional: Negative.    HENT: Negative.     Respiratory: Negative.     Cardiovascular: Negative.    Gastrointestinal: Negative.    Endocrine: Negative.    Genitourinary: Negative.         Dr Antonio    Musculoskeletal:  Positive for back pain.   Neurological:         Neuropathy   Psychiatric/Behavioral: Negative.         Objective   /72 (BP Location: Left arm)   Pulse 61   Temp 36.4 °C (97.5 °F) (Temporal)   Ht 1.676 m (5' 6\")   Wt 72.2 kg (159 lb 3.2 oz)   SpO2 97%   BMI 25.70 kg/m²     Physical Exam  Vitals and nursing note reviewed.   Constitutional:       Appearance: Normal appearance.   HENT:      Right Ear: Tympanic membrane normal.      Left Ear: Tympanic membrane normal.      Mouth/Throat:      Pharynx: Oropharynx is clear.   Cardiovascular:      Rate and Rhythm: Normal rate and regular rhythm.      Pulses: Normal pulses.      Heart sounds: Normal heart sounds.   Pulmonary:      Breath sounds: Normal breath sounds.   Abdominal:      Palpations: Abdomen is soft.   Musculoskeletal:      Comments: Lumbar stenosis   Neurological:      General: No focal deficit present.      Mental Status: She is alert and oriented to person, place, and time.   Psychiatric:         Mood and Affect: Mood normal.         Behavior: Behavior normal.         Assessment/Plan patient seen here for an annual Medicare wellness exam.  Reviewed her questionnaire she is agreeable to her responses.  We did discuss advanced directives.  She has no significant difficulty with depression or anxiety.  We are drawing her lab work here today let her know the results as soon as available.  Will see her back in a year  Problem List Items Addressed This Visit             ICD-10-CM    Coronary artery disease involving native coronary artery of native heart with " other form of angina pectoris (CMS-HCC) I25.118    Diabetes mellitus type 2, controlled (Multi) E11.9    Relevant Orders    Hemoglobin A1C    Essential hypertension I10    Relevant Orders    CBC and Auto Differential    Comprehensive Metabolic Panel    Hypercholesterolemia E78.00    Relevant Orders    Lipid Panel    RESOLVED: Renal cell carcinoma of right kidney (Multi) C64.1    Lumbar degenerative disc disease M51.36    Neuropathy due to type 2 diabetes mellitus (Multi) E11.40    Chronic hepatitis C without hepatic coma (Multi) B18.2     Other Visit Diagnoses         Codes    Medicare annual wellness visit, subsequent    -  Primary Z00.00    (Idiopathic) normal pressure hydrocephalus (Multi)     G91.2    BMI 25.0-25.9,adult     Z68.25

## 2024-08-05 NOTE — ACP (ADVANCE CARE PLANNING)
Confirming Previous Code Status:   Advance Care Planning Note     Discussion Date: 08/05/24   Discussion Participants: patient    The patient wishes to discuss Advance Care Planning today and the following is a brief summary of our discussion.     Patient has capacity to make their own medical decisions: Yes  Health Care Agent/Surrogate Decision Maker documented in chart: Yes    Documents on file and valid:  Advance Directive/Living Will: Yes   Health Care Power of : Yes  Other: none    Communication of Medical Status/Prognosis:   yes     Communication of Treatment Goals/Options:   yes     Treatment Decisions  Goals of Care: survival is paramount regardless of prognosis, treatment outcome, or burden   yes  Follow Up Plan  no  Team Members  myself  Time Statement: Total face to face time spent on advance care planning was 16 minutes with 16 minutes spent in counseling, including the explanation.    Darren Brown DO  8/5/2024 10:31 AM

## 2024-08-06 ENCOUNTER — TELEPHONE (OUTPATIENT)
Dept: PRIMARY CARE | Facility: CLINIC | Age: 74
End: 2024-08-06
Payer: MEDICARE

## 2024-08-06 DIAGNOSIS — K21.9 GERD WITHOUT ESOPHAGITIS: ICD-10-CM

## 2024-08-06 RX ORDER — CYANOCOBALAMIN 1000 UG/ML
INJECTION, SOLUTION INTRAMUSCULAR; SUBCUTANEOUS
Qty: 1 ML | Refills: 0 | Status: SHIPPED | OUTPATIENT
Start: 2024-08-06

## 2024-08-30 ENCOUNTER — APPOINTMENT (OUTPATIENT)
Dept: NEUROSURGERY | Facility: CLINIC | Age: 74
End: 2024-08-30
Payer: MEDICARE

## 2024-09-28 DIAGNOSIS — G89.4 CHRONIC PAIN SYNDROME: ICD-10-CM

## 2024-09-30 RX ORDER — DULOXETIN HYDROCHLORIDE 60 MG/1
60 CAPSULE, DELAYED RELEASE ORAL DAILY
Qty: 90 CAPSULE | Refills: 3 | Status: SHIPPED | OUTPATIENT
Start: 2024-09-30

## 2024-10-06 DIAGNOSIS — G47.00 INSOMNIA, UNSPECIFIED TYPE: ICD-10-CM

## 2024-10-07 RX ORDER — TRAZODONE HYDROCHLORIDE 50 MG/1
TABLET ORAL
Qty: 60 TABLET | Refills: 11 | Status: SHIPPED | OUTPATIENT
Start: 2024-10-07

## 2024-10-14 ENCOUNTER — APPOINTMENT (OUTPATIENT)
Dept: NEUROSURGERY | Facility: CLINIC | Age: 74
End: 2024-10-14
Payer: MEDICARE

## 2024-10-14 VITALS
SYSTOLIC BLOOD PRESSURE: 114 MMHG | DIASTOLIC BLOOD PRESSURE: 64 MMHG | HEART RATE: 78 BPM | BODY MASS INDEX: 24.75 KG/M2 | HEIGHT: 66 IN | WEIGHT: 154 LBS | TEMPERATURE: 98.1 F

## 2024-10-14 DIAGNOSIS — M54.16 LUMBAR RADICULOPATHY: Primary | ICD-10-CM

## 2024-10-14 ASSESSMENT — PATIENT HEALTH QUESTIONNAIRE - PHQ9
SUM OF ALL RESPONSES TO PHQ9 QUESTIONS 1 & 2: 2
1. LITTLE INTEREST OR PLEASURE IN DOING THINGS: MORE THAN HALF THE DAYS
10. IF YOU CHECKED OFF ANY PROBLEMS, HOW DIFFICULT HAVE THESE PROBLEMS MADE IT FOR YOU TO DO YOUR WORK, TAKE CARE OF THINGS AT HOME, OR GET ALONG WITH OTHER PEOPLE: SOMEWHAT DIFFICULT
2. FEELING DOWN, DEPRESSED OR HOPELESS: NOT AT ALL

## 2024-10-14 ASSESSMENT — PAIN SCALES - GENERAL: PAINLEVEL: 10-WORST PAIN EVER

## 2024-10-14 NOTE — PROGRESS NOTES
[facility]  Department of Neurological Surgery  New Patient Visit    History of Present Illness:  Gabby Madrid is a 74 y.o. year old female who presents to the spine clinic with low back pain and refractory lumbar radiculopathy, left worse than right. Following an L5-S1 fusion, she saw some improvement but has been worsening over the last 2-3 years. She has difficulty performing ADLs. She is seeing pain management regularly and has performed home PT exercises, lifestyle modifications, and injections. Notably, her recent injection done 5-6 weeks ago gave only temporary improvement. Pt has history of osteopenia and diabetes.    Prior Spine Surgeries: L5-S1 fusion by Dr. Ellis    Physical Therapy: yes    Diabetic: yes   Diabetes Composite Score: 4   Values used to calculate this score:    Points  Metrics       1        Blood Pressure: 114/64       1        Prescribed Statins: Yes       1        Hemoglobin A1c: 6.3%       1        Smokes Tobacco: No       0        Prescribed Aspirin: No      Osteoporosis: osteopenia  No DXA results found for the past 12 months    Patient's BMI is Body mass index is 24.86 kg/m².    Review of Systems:  14/14 systems reviewed and negative other than what is listed in the history of present illness    Patient Active Problem List   Diagnosis    Abnormal mammography    Anemia    Atypical chest pain    Coronary artery disease involving native coronary artery of native heart with other form of angina pectoris    Chronic back pain    Chronic pain syndrome    Low back pain    Chronic constipation    Degenerative spondylolisthesis    Depression with anxiety    Diabetes mellitus type 2, controlled (Multi)    Essential hypertension    Fatigue    Generalized osteoarthritis of multiple sites    GERD without esophagitis    Headache, chronic daily    Hematuria    History of breast cancer    Hypercholesterolemia    Impingement syndrome of left shoulder    Impingement  syndrome of right shoulder    Insomnia    Joint pain    Cervical radiculopathy at C6    Knee pain, right    Knee pain, left    Left cervical radiculopathy    Left serous otitis media    Limb pain    Liver mass    Lumbar radiculitis    Lumbar stenosis    Multiple insect bites    Myofascial pain syndrome    Normal pressure hydrocephalus (Multi)    Oral thrush    Postlaminectomy syndrome, lumbar    Primary localized osteoarthritis of right hip    Primary osteoarthritis of right knee    Right hip pain    Right kidney mass    Renal calculus    Sacroiliac joint pain    SOB (shortness of breath) on exertion    Bilateral shoulder pain    Sore throat    Status post left knee replacement    Tinea cruris    TMJ (sprain of temporomandibular joint)    Trochanteric bursitis    Vertebral artery occlusion, left    Vitreous floaters of left eye    Dizziness    Deficiency of vitamin B12    Bladder outlet obstruction    Acute bronchitis    Acute recurrent pansinusitis    Herpangina    Shoulder pain    Chronic right hip pain    Abdominal mass    Kidney stone    Black stools    Decreased libido    Epigastric pain    H. pylori infection    Herniated disc, cervical    Lumbosacral spondylosis without myelopathy    Osteopenia    Primary localized osteoarthrosis, lower leg    Sacroiliitis, not elsewhere classified (CMS-HCC)    Unintentional weight loss    Urolithiasis    Burning in the chest    Greater trochanteric bursitis of left hip    Myalgia    Depressive disorder    Atrophic vaginitis    Lumbar radiculopathy    Lumbar spondylosis    Radiculitis    Chronic pain disorder    Lumbago    Lumbar postlaminectomy syndrome    Calculus of ureter    Diverticulosis    Hyperlipoproteinemia    Obstructive uropathy    Other specified abnormal findings of blood chemistry    Chest pain, atypical    Constipation    Calculus of right kidney    UTI (urinary tract infection)    Headache    Cervical postlaminectomy syndrome    Postlaminectomy syndrome of  lumbar region    Abdominal pain    Sacroiliitis (CMS-HCC)    History of hypertension    History of type 2 diabetes mellitus    Impingement syndrome of shoulder region    Lumbar degenerative disc disease    Neuropathy due to type 2 diabetes mellitus (Multi)    Nonspecific abnormal finding in stool contents    Sciatica of left side    Chronic hepatitis C without hepatic coma (Multi)     Past Medical History:   Diagnosis Date    Personal history of other diseases of the circulatory system 10/28/2015    History of hypertension    Personal history of other endocrine, nutritional and metabolic disease 04/02/2021    History of type 2 diabetes mellitus    Type 2 diabetes mellitus with diabetic neuropathy, unspecified (Multi) 10/28/2015    Diabetic neuropathy associated with type 2 diabetes mellitus     Past Surgical History:   Procedure Laterality Date    APPENDECTOMY  12/22/2015    Appendectomy    CT ANGIO NECK  4/28/2017    CT NECK ANGIO W AND WO IV CONTRAST 4/28/2017 [facility]    HYSTERECTOMY  12/22/2015    Hysterectomy    LUMBAR FUSION  09/29/2016    Lumbar Vertebral Fusion    MR HEAD ANGIO WO IV CONTRAST  12/8/2020    MR HEAD ANGIO WO IV CONTRAST 12/8/2020 [facility]    OTHER SURGICAL HISTORY  08/30/2016    Spinal Diskectomy Lumbar    OTHER SURGICAL HISTORY  05/19/2022    Hip replacement    TONSILLECTOMY  12/22/2015    Tonsillectomy    TOTAL KNEE ARTHROPLASTY  08/30/2016    Knee Replacement     Social History     Tobacco Use    Smoking status: Never    Smokeless tobacco: Never   Substance Use Topics    Alcohol use: Never     family history includes Cardiac Disorder in her father, half-brother, and maternal grandfather; Dementia in her mother; Heart attack (age of onset: 43) in her half-brother; Heart attack (age of onset: 63) in her father; PPM in her maternal grandfather.    Current Outpatient Medications:     amLODIPine (Norvasc) 5 mg tablet, TAKE 1 TABLET EVERY DAY, Disp: 90 tablet, Rfl: 3     atorvastatin (Lipitor) 20 mg tablet, TAKE 1/2 TABLET EVERY DAY, Disp: 45 tablet, Rfl: 3    blood sugar diagnostic (OneTouch Verio test strips) strip, 1 strip by percutaneous route once daily. Test blood sugar once daily. DX Code E11.9 Non Insulin Dependent, Disp: 100 strip, Rfl: 3    blood-glucose meter misc, Check blood sugars daily for diagnosis 11.9 non-insulin-dependent, Disp: 1 each, Rfl: 0    cyanocobalamin (Vitamin B-12) 1,000 mcg/mL injection, INJECT 1 ML AS DIRECTED EVERY 30 DAYS., Disp: 1 mL, Rfl: 0    cyanocobalamin (Vitamin B-12) 500 mcg tablet, Take 1 tablet (500 mcg) by mouth once daily., Disp: , Rfl:     cyanocobalamin, vitamin B-12, (B-12 Compliance) 1,000 mcg/mL kit, Inject 1 mL as directed every 30 (thirty) days., Disp: 1 kit, Rfl: 1    DULoxetine (Cymbalta) 60 mg DR capsule, TAKE 1 CAPSULE EVERY DAY, Disp: 90 capsule, Rfl: 3    hydroCHLOROthiazide (HYDRODiuril) 50 mg tablet, Take 1 tablet (50 mg) by mouth once daily., Disp: , Rfl:     multivitamin with minerals (multivit-min-iron fum-folic ac) tablet, Take by mouth., Disp: , Rfl:     nystatin (Mycostatin) 100,000 unit/gram powder, Apply 1 Application topically 2 times a day., Disp: 60 g, Rfl: 2    OneTouch Delica Plus Lancet 33 gauge misc, TEST EVERY DAY, Disp: , Rfl:     potassium chloride CR (K-Tab) 20 mEq ER tablet, Take by mouth., Disp: , Rfl:     pregabalin (Lyrica) 150 mg capsule, Take 1 capsule (150 mg) by mouth every 12 hours., Disp: , Rfl:     traZODone (Desyrel) 50 mg tablet, TAKE 1 TO 2 TABLETS AT BEDTIME AS NEEDED FOR SLEEP, Disp: 60 tablet, Rfl: 11    alpha tocopherol (Vitamin E) 670 mg (1,000 unit) capsule, Take 1 capsule (1,000 Units) by mouth once daily., Disp: , Rfl:     ascorbic acid, vitamin C, 1,000 mg ER tablet, Take by mouth., Disp: , Rfl:     aspirin 81 mg EC tablet, Take 1 tablet (81 mg) by mouth once daily., Disp: , Rfl:     cholecalciferol (Vitamin D-3) 50 mcg (2,000 unit) capsule, Take by mouth., Disp: , Rfl:      coenzyme Q-10 100 mg capsule, Take 1 capsule (100 mg) by mouth once daily., Disp: , Rfl:     gabapentin (Neurontin) 300 mg capsule, Take one(1) tablet three times daily., Disp: , Rfl:     GaviLyte-G 236-22.74-6.74 -5.86 gram solution, , Disp: , Rfl:     hydrocortisone 2.5 % cream, Apply topically twice a day. Sparingly to affected areas., Disp: , Rfl:     ibuprofen (Motrin IB) 200 mg tablet, Take by mouth., Disp: , Rfl:     neomycin-polymyxin-dexAMETHasone (Maxitrol) 3.5mg/mL-10,000 unit/mL-0.1 % ophthalmic suspension, INSTILL ONE DROP INTO RIGHT EYE THREE TIMES A DAY FOR 5 TO 7 DAYS, Disp: , Rfl:     oxyCODONE-acetaminophen (Percocet) 5-325 mg tablet, Take 1 tablet by mouth 3 times a day., Disp: , Rfl:     plecanatide (Trulance) tablet tablet, Take 1 tablet (3 mg) by mouth twice a day., Disp: 60 tablet, Rfl: 2    polyethylene glycol (Glycolax) 17 gram/dose powder, Take by mouth once daily., Disp: , Rfl:     triamcinolone (Kenalog) 0.1 % oral paste, Apply 1 Application to teeth twice a day., Disp: , Rfl:   Allergies   Allergen Reactions    Hydromorphone (Bulk) Itching    Iodinated Contrast Media Other and Rash     Pt. states it makes her develop a rash and feel hot    Sulindac Unknown and Rash     Increased blood count, liver malfunction       Physical Examination    General: Well developed, awake/alert/oriented x3, no distress, alert and cooperative  Skin: Warm and dry, no lesions, no rashes  ENMT: Mucous membranes moist, no apparent injury, no lesions seen  Head/Neck: Neck Supple, no apparent injury  Respiratory/Thorax: Normal breath sounds with good chest expansion, thorax symmetric  Cardiovascular: No pitting edema, no JVD    Motor Strength: 5/5 Throughout all extremities    Muscle Bulk: Normal and symmetric in all extremities    Posture:   -- Cervical: Normal  -- Thoracic: Normal  -- Lumbar : Normal  Paraspinal muscle spasm/tenderness absent.     Sensation: intact to light touch    L5 lumbar radiculopathy b/l,  L>R  Severe back pain    Results    I personally reviewed and interpreted the imaging results which included CT, x-rays, and MRI showing asymmetric collapse of disc space at L4-5 with moderate to severe foraminal and lateral recess stenosis and questionable loosency of the L5-S1 screws on the right side.    Assessment and Plan:    Gabby Madrid is a 74 y.o. year old female who presents to the spine clinic with low back pain and refractory lumbar radiculopathy, left worse than right. Following an L5-S1 fusion, she saw some improvement but has been worsening over the last 2-3 years. She has difficulty performing ADLs. She is seeing pain management regularly and has performed home PT exercises, lifestyle modifications, and injections. Notably, her recent injection done 5-6 weeks ago gave only temporary improvement. Pt has history of osteopenia and diabetes.    I have reviewed imaging and diagnosis with the patient, discussed the natural history of their disease and both non-operative and operative treatments available and rationale vs risks for both.    The patient’s clinical symptoms correlates well with the radiological findings. Patient has been having significant functional impairment with decreased ability to perform her normal activities of daily living. They have tried treatment options including medications (NSAIDs/narcotics/muscle relaxants/membrane stabilizers), formal physical therapy, and injections.    I offered the option of surgery that would consist of a L4-S1 extension revision of hardware with L4-5 transforaminal lumbar interbody fusion.    While there is no evidence of instability in the form of pars defect or spondylolisthesis or prior discectomies; because of the presence of foraminal and extraforaminal severe stenosis, extensive decompression with removal of almost the entire facet in the form of complete facetectomy /resection of pars interarticularis or more than 75% of the facet will have to  be performed at the operative levels that will result in destabilization of the spine/intraoperative spinal instability and hence would require concomitant stabilization by placement of pedicle screws. I believe that not performing a fusion and instrumentation following the extensive decompression will be a suboptimal treatment and leave the spine destabilized with potential worsening of her symptoms and development of spinal deformity that may require a much bigger revision surgery that currently planned.    I have explained the surgical procedure in detail with expected duration and extent of recovery along risks of surgery that include, but is not limited to bleeding, infection, blood vessel injury or damage, loss of sensation, loss of bladder, bowel or sexual function, nerve injury/damage resulting in weakness/paralysis, malunion, nonunion, CSF leak, brachial plexus injury, peripheral vision blindness, failure of implants/fusion, failure to relieve symptoms, recurrent disease, adjacent segment disease, need to reoperate for any reason and general anesthesia reaction such as stroke, coma, heart attack, delirium, confusion, death as well as worsening of preexisted medical conditions.    All questions were answered and the patient left satisfied with the surgical plan moving forward.       I have reviewed all prior documentation and reviewed the electronic medical record since admission. I have personally have reviewed all advanced imaging not just the reports and used my interpretation as documented as the relevant findings. I have reviewed the risks and benefits of all treatment recommendations listed in this note with the patient and family.       The above clinical summary has been dictated with voice recognition software. It has not been proofread for grammatical errors, typographical mistakes, or other semantic inconsistencies.    Thank you for visiting our office today. It was our pleasure to take part in  your healthcare.     Do not hesitate to call with any questions regarding your plan of care after leaving at [phone] M-F 8am-4pm.     To clinicians, thank you very much for this kind referral. It is a privilege to partner with you in the care of your patients. My office would be delighted to assist you with any further consultations or with questions regarding the plan of care outlined. Do not hesitate to call the office or contact me directly.       Sincerely,      Jacoby Casillas MD, [facility]  Spine , [facility]  [facility] Chair in Spinal Neurosurgery  Complex Spine Surgery Fellowship Director   of Neurological Surgery  [employer]  Phone: [phone]  Fax: [phone]        Scribe Attestation  By signing my name below, I Ana Blanca , Amparo   attest that this documentation has been prepared under the direction and in the presence of Jacoby Casillas MD.

## 2024-10-15 DIAGNOSIS — N39.41 URGENCY INCONTINENCE: Primary | ICD-10-CM

## 2024-10-15 DIAGNOSIS — R79.1 ABNORMAL COAGULATION PROFILE: ICD-10-CM

## 2024-10-15 DIAGNOSIS — M54.16 LUMBAR RADICULOPATHY: Primary | ICD-10-CM

## 2024-10-15 RX ORDER — OXYBUTYNIN CHLORIDE 5 MG/1
5 TABLET ORAL 2 TIMES DAILY
Qty: 60 TABLET | Refills: 11 | Status: SHIPPED | OUTPATIENT
Start: 2024-10-15 | End: 2025-10-15

## 2024-10-15 RX ORDER — ACETAMINOPHEN 325 MG/1
975 TABLET ORAL ONCE
OUTPATIENT
Start: 2024-10-15 | End: 2024-10-15

## 2024-10-15 RX ORDER — TRANEXAMIC ACID 650 MG/1
1300 TABLET ORAL ONCE
OUTPATIENT
Start: 2024-10-15 | End: 2024-10-15

## 2024-10-15 RX ORDER — CELECOXIB 400 MG/1
400 CAPSULE ORAL ONCE
OUTPATIENT
Start: 2024-10-15 | End: 2024-10-15

## 2024-10-22 NOTE — PROGRESS NOTES
Subjective   Patient ID: Gabby Madrid is a 74 y.o. female who presents for No chief complaint on file..  HPI  PT PRESENTS FOR  RE-EVALUATION OF HER KIDNEY STONES.  PT TO HAVE LUMBAR SURGERY IN THE NEAR FUTURE.    Are you experiencing:  Burning on urination -- NO  Pain on urination  -- NO  Urinary frequency -- NO   Urinary urgency -- NO  Urge incontinence -- NO  Urinary stress incontinence  -- NO  Number of pads used per day -- 2 X / DAY -- FOR PEACE OF MIND   Eneuresis -- NO  Nocturia-- 1 X ON AVG  Hematuria -- NO  Hesitancy -- OCC  Post void fullness -- NO      Review of Systems  General-- No C/O fever or chills  Head-- No C/O Dizziness  Eyes-- NO  C/O blurry or double vision  Ears-- No C/O hearing loss  Neck-- Supple  Chest-- No C/O pain or discomfort  Lungs-- No C/O shortness of breath  Abdomen-- No C/O  pain or discomfort, No nausea or vomiting  Back-- PT C/O  SEVERE back pain AND SCIATICA  Extremities-- No C/O swelling or pain    Objective   Physical Exam    General-- well developed, well nourished in NAD  Head-- normal cephalic, atraumatic  Eyes-- PERRL, EOM'S FROM,  no  jaundice  Neck-- Supple, without masses  Chest-- Normal bony structure  Abdomen-- soft, non tender, liver spleen not palpable . No supra pubic masses  Back-- no flank masses palpable, no CVA tenderness on palpation or perc;ussion-- PT C/O LOW BACK PAIN ON PALPATION   Lymph nodes-- No inguinal lymphadenopathy noted  Extremities -- Normal muscle mass and tone for the patients age  Neurological-- oriented times three    Assessment/Plan   A:  Long history of recurrent kidney stone disease--at this point in time    MR Abd renal 5/2024 showed an endophytic lower pole renal mass measuring 2.5cm x2.2cm x 1.9cm. Previously this measured 1.8cm, 1.5cm and 2.5cm. --  This was initially thought to be a potential urothelial carcinoma.  Dr. Antonio took her for URS in Feb 2022. I did not find a tumour within the renal collecting system but I did find a  stone which I removed. She subsequently had stone surgery with Dr. Flores for an obstructing acute stone. The stone was calcium based 70% CaOx 30% CaP. She is a 12x stone former. Has done Litholink in the past.     Long history of severe degenerative disc disease of the spine--patient is to undergo surgery in the near future to see if this will help with her severe back pain    P:  Reassurance, education rendered to the patient  Obtain all old records from [facility] urology  Thang Castro MD 10/22/24 4:05 PM

## 2024-10-23 ENCOUNTER — OFFICE VISIT (OUTPATIENT)
Dept: UROLOGY | Facility: CLINIC | Age: 74
End: 2024-10-23
Payer: MEDICARE

## 2024-10-23 ENCOUNTER — APPOINTMENT (OUTPATIENT)
Dept: UROLOGY | Facility: CLINIC | Age: 74
End: 2024-10-23
Payer: MEDICARE

## 2024-10-23 VITALS
HEART RATE: 76 BPM | TEMPERATURE: 97.8 F | WEIGHT: 154 LBS | RESPIRATION RATE: 16 BRPM | DIASTOLIC BLOOD PRESSURE: 57 MMHG | SYSTOLIC BLOOD PRESSURE: 111 MMHG | BODY MASS INDEX: 24.75 KG/M2 | HEIGHT: 66 IN

## 2024-10-23 DIAGNOSIS — R39.11 HESITANCY OF MICTURITION: ICD-10-CM

## 2024-10-23 DIAGNOSIS — R35.1 NOCTURIA: ICD-10-CM

## 2024-10-23 DIAGNOSIS — N20.0 KIDNEY STONE: Primary | ICD-10-CM

## 2024-10-23 PROCEDURE — 3074F SYST BP LT 130 MM HG: CPT | Performed by: UROLOGY

## 2024-10-23 PROCEDURE — 99213 OFFICE O/P EST LOW 20 MIN: CPT | Performed by: UROLOGY

## 2024-10-23 PROCEDURE — 1160F RVW MEDS BY RX/DR IN RCRD: CPT | Performed by: UROLOGY

## 2024-10-23 PROCEDURE — 3048F LDL-C <100 MG/DL: CPT | Performed by: UROLOGY

## 2024-10-23 PROCEDURE — 3078F DIAST BP <80 MM HG: CPT | Performed by: UROLOGY

## 2024-10-23 PROCEDURE — 1126F AMNT PAIN NOTED NONE PRSNT: CPT | Performed by: UROLOGY

## 2024-10-23 PROCEDURE — 3008F BODY MASS INDEX DOCD: CPT | Performed by: UROLOGY

## 2024-10-23 PROCEDURE — 1159F MED LIST DOCD IN RCRD: CPT | Performed by: UROLOGY

## 2024-10-23 PROCEDURE — 3044F HG A1C LEVEL LT 7.0%: CPT | Performed by: UROLOGY

## 2024-10-23 SDOH — ECONOMIC STABILITY: FOOD INSECURITY: WITHIN THE PAST 12 MONTHS, YOU WORRIED THAT YOUR FOOD WOULD RUN OUT BEFORE YOU GOT MONEY TO BUY MORE.: NEVER TRUE

## 2024-10-23 SDOH — ECONOMIC STABILITY: FOOD INSECURITY: WITHIN THE PAST 12 MONTHS, THE FOOD YOU BOUGHT JUST DIDN'T LAST AND YOU DIDN'T HAVE MONEY TO GET MORE.: NEVER TRUE

## 2024-10-23 ASSESSMENT — COLUMBIA-SUICIDE SEVERITY RATING SCALE - C-SSRS
2. HAVE YOU ACTUALLY HAD ANY THOUGHTS OF KILLING YOURSELF?: NO
6. HAVE YOU EVER DONE ANYTHING, STARTED TO DO ANYTHING, OR PREPARED TO DO ANYTHING TO END YOUR LIFE?: NO
1. IN THE PAST MONTH, HAVE YOU WISHED YOU WERE DEAD OR WISHED YOU COULD GO TO SLEEP AND NOT WAKE UP?: NO

## 2024-10-23 ASSESSMENT — LIFESTYLE VARIABLES
SKIP TO QUESTIONS 9-10: 1
HOW OFTEN DO YOU HAVE A DRINK CONTAINING ALCOHOL: NEVER
HOW MANY STANDARD DRINKS CONTAINING ALCOHOL DO YOU HAVE ON A TYPICAL DAY: PATIENT DOES NOT DRINK
AUDIT-C TOTAL SCORE: 0
HOW OFTEN DO YOU HAVE SIX OR MORE DRINKS ON ONE OCCASION: NEVER

## 2024-10-23 ASSESSMENT — PATIENT HEALTH QUESTIONNAIRE - PHQ9
1. LITTLE INTEREST OR PLEASURE IN DOING THINGS: NOT AT ALL
SUM OF ALL RESPONSES TO PHQ9 QUESTIONS 1 AND 2: 0
2. FEELING DOWN, DEPRESSED OR HOPELESS: NOT AT ALL

## 2024-10-23 ASSESSMENT — ENCOUNTER SYMPTOMS
OCCASIONAL FEELINGS OF UNSTEADINESS: 0
DEPRESSION: 0
LOSS OF SENSATION IN FEET: 0

## 2024-10-23 ASSESSMENT — PAIN SCALES - GENERAL: PAINLEVEL_OUTOF10: 0-NO PAIN

## 2024-10-23 NOTE — LETTER
October 26, 2024     Darren Brown DO  [address]  [address]  [facility] [address]    Patient: Gabby Madrid   YOB: 1950   Date of Visit: 10/23/2024       Dear Dr. Darren Brown DO:    Thank you for referring Gabby Madrid to me for evaluation. Below are my notes for this consultation.  If you have questions, please do not hesitate to call me. I look forward to following your patient along with you.       Sincerely,     Thang Castro MD      CC: No Recipients  ______________________________________________________________________________________    Subjective  Patient ID: Gabby Madrid is a 74 y.o. female who presents for No chief complaint on file..  HPI  PT PRESENTS FOR  RE-EVALUATION OF HER KIDNEY STONES.  PT TO HAVE LUMBAR SURGERY IN THE NEAR FUTURE.    Are you experiencing:  Burning on urination -- NO  Pain on urination  -- NO  Urinary frequency -- NO   Urinary urgency -- NO  Urge incontinence -- NO  Urinary stress incontinence  -- NO  Number of pads used per day -- 2 X / DAY -- FOR PEACE OF MIND   Eneuresis -- NO  Nocturia-- 1 X ON AVG  Hematuria -- NO  Hesitancy -- OCC  Post void fullness -- NO      Review of Systems  General-- No C/O fever or chills  Head-- No C/O Dizziness  Eyes-- NO  C/O blurry or double vision  Ears-- No C/O hearing loss  Neck-- Supple  Chest-- No C/O pain or discomfort  Lungs-- No C/O shortness of breath  Abdomen-- No C/O  pain or discomfort, No nausea or vomiting  Back-- PT C/O  SEVERE back pain AND SCIATICA  Extremities-- No C/O swelling or pain    Objective   Physical Exam    General-- well developed, well nourished in NAD  Head-- normal cephalic, atraumatic  Eyes-- PERRL, EOM'S FROM,  no  jaundice  Neck-- Supple, without masses  Chest-- Normal bony structure  Abdomen-- soft, non tender, liver spleen not palpable . No supra pubic masses  Back-- no flank masses palpable, no CVA tenderness on palpation or perc;ussion-- PT C/O LOW BACK PAIN  ON PALPATION   Lymph nodes-- No inguinal lymphadenopathy noted  Extremities -- Normal muscle mass and tone for the patients age  Neurological-- oriented times three    Assessment/Plan   A:  Long history of recurrent kidney stone disease--at this point in time    MR Rafi renal 5/2024 showed an endophytic lower pole renal mass measuring 2.5cm x2.2cm x 1.9cm. Previously this measured 1.8cm, 1.5cm and 2.5cm. --  This was initially thought to be a potential urothelial carcinoma.  Dr. Antonio took her for URS in Feb 2022. I did not find a tumour within the renal collecting system but I did find a stone which I removed. She subsequently had stone surgery with Dr. Flores for an obstructing acute stone. The stone was calcium based 70% CaOx 30% CaP. She is a 12x stone former. Has done Litholink in the past.     Long history of severe degenerative disc disease of the spine--patient is to undergo surgery in the near future to see if this will help with her severe back pain    P:  Reassurance, education rendered to the patient  Obtain all old records from [facility] urology  Thang Castro MD 10/22/24 4:05 PM

## 2024-10-29 ENCOUNTER — PRE-ADMISSION TESTING (OUTPATIENT)
Dept: PREADMISSION TESTING | Facility: HOSPITAL | Age: 74
End: 2024-10-29
Payer: MEDICARE

## 2024-10-29 ENCOUNTER — ANESTHESIA EVENT (OUTPATIENT)
Dept: OPERATING ROOM | Facility: HOSPITAL | Age: 74
End: 2024-10-29
Payer: MEDICARE

## 2024-10-29 VITALS
BODY MASS INDEX: 25.54 KG/M2 | TEMPERATURE: 98 F | SYSTOLIC BLOOD PRESSURE: 138 MMHG | HEART RATE: 72 BPM | HEIGHT: 66 IN | DIASTOLIC BLOOD PRESSURE: 73 MMHG | WEIGHT: 158.9 LBS | OXYGEN SATURATION: 98 %

## 2024-10-29 DIAGNOSIS — R79.1 ABNORMAL COAGULATION PROFILE: ICD-10-CM

## 2024-10-29 DIAGNOSIS — Z01.811 PREOPERATIVE RESPIRATORY EXAMINATION: ICD-10-CM

## 2024-10-29 DIAGNOSIS — Z01.818 PREOPERATIVE CLEARANCE: ICD-10-CM

## 2024-10-29 DIAGNOSIS — M54.16 LUMBAR RADICULOPATHY: ICD-10-CM

## 2024-10-29 DIAGNOSIS — Z01.810 PREOPERATIVE CARDIOVASCULAR EXAMINATION: ICD-10-CM

## 2024-10-29 DIAGNOSIS — Z41.9 SURGERY, ELECTIVE: Primary | ICD-10-CM

## 2024-10-29 LAB
ABO GROUP (TYPE) IN BLOOD: NORMAL
ANION GAP SERPL CALC-SCNC: 14 MMOL/L (ref 10–20)
ANTIBODY SCREEN: NORMAL
APTT PPP: 33 SECONDS (ref 27–38)
BASOPHILS # BLD AUTO: 0.06 X10*3/UL (ref 0–0.1)
BASOPHILS NFR BLD AUTO: 0.8 %
BUN SERPL-MCNC: 14 MG/DL (ref 6–23)
CALCIUM SERPL-MCNC: 9.5 MG/DL (ref 8.6–10.6)
CHLORIDE SERPL-SCNC: 100 MMOL/L (ref 98–107)
CO2 SERPL-SCNC: 33 MMOL/L (ref 21–32)
CREAT SERPL-MCNC: 0.79 MG/DL (ref 0.5–1.05)
EGFRCR SERPLBLD CKD-EPI 2021: 79 ML/MIN/1.73M*2
EOSINOPHIL # BLD AUTO: 0.27 X10*3/UL (ref 0–0.4)
EOSINOPHIL NFR BLD AUTO: 3.5 %
ERYTHROCYTE [DISTWIDTH] IN BLOOD BY AUTOMATED COUNT: 13.3 % (ref 11.5–14.5)
GLUCOSE SERPL-MCNC: 97 MG/DL (ref 74–99)
HCT VFR BLD AUTO: 44.4 % (ref 36–46)
HGB BLD-MCNC: 14.5 G/DL (ref 12–16)
IMM GRANULOCYTES # BLD AUTO: 0.04 X10*3/UL (ref 0–0.5)
IMM GRANULOCYTES NFR BLD AUTO: 0.5 % (ref 0–0.9)
INR PPP: 1 (ref 0.9–1.1)
LYMPHOCYTES # BLD AUTO: 2.9 X10*3/UL (ref 0.8–3)
LYMPHOCYTES NFR BLD AUTO: 37.4 %
MCH RBC QN AUTO: 30.3 PG (ref 26–34)
MCHC RBC AUTO-ENTMCNC: 32.7 G/DL (ref 32–36)
MCV RBC AUTO: 93 FL (ref 80–100)
MONOCYTES # BLD AUTO: 0.69 X10*3/UL (ref 0.05–0.8)
MONOCYTES NFR BLD AUTO: 8.9 %
NEUTROPHILS # BLD AUTO: 3.8 X10*3/UL (ref 1.6–5.5)
NEUTROPHILS NFR BLD AUTO: 48.9 %
NRBC BLD-RTO: 0 /100 WBCS (ref 0–0)
PLATELET # BLD AUTO: 251 X10*3/UL (ref 150–450)
POTASSIUM SERPL-SCNC: 4 MMOL/L (ref 3.5–5.3)
PREALB SERPL-MCNC: 26.2 MG/DL (ref 18–40)
PROTHROMBIN TIME: 11.4 SECONDS (ref 9.8–12.8)
RBC # BLD AUTO: 4.78 X10*6/UL (ref 4–5.2)
RH FACTOR (ANTIGEN D): NORMAL
SODIUM SERPL-SCNC: 143 MMOL/L (ref 136–145)
WBC # BLD AUTO: 7.8 X10*3/UL (ref 4.4–11.3)

## 2024-10-29 PROCEDURE — 87081 CULTURE SCREEN ONLY: CPT

## 2024-10-29 PROCEDURE — 86850 RBC ANTIBODY SCREEN: CPT

## 2024-10-29 PROCEDURE — 36415 COLL VENOUS BLD VENIPUNCTURE: CPT

## 2024-10-29 PROCEDURE — 84134 ASSAY OF PREALBUMIN: CPT

## 2024-10-29 PROCEDURE — 85025 COMPLETE CBC W/AUTO DIFF WBC: CPT

## 2024-10-29 PROCEDURE — 85730 THROMBOPLASTIN TIME PARTIAL: CPT

## 2024-10-29 PROCEDURE — 80048 BASIC METABOLIC PNL TOTAL CA: CPT

## 2024-10-29 PROCEDURE — 99205 OFFICE O/P NEW HI 60 MIN: CPT | Performed by: ANESTHESIOLOGY

## 2024-10-29 RX ORDER — CHLORHEXIDINE GLUCONATE 40 MG/ML
1 SOLUTION TOPICAL DAILY
Qty: 25 ML | Refills: 0 | Status: SHIPPED | OUTPATIENT
Start: 2024-10-29 | End: 2024-11-03

## 2024-10-29 RX ORDER — CHLORHEXIDINE GLUCONATE ORAL RINSE 1.2 MG/ML
15 SOLUTION DENTAL DAILY
Qty: 30 ML | Refills: 0 | Status: SHIPPED | OUTPATIENT
Start: 2024-10-29 | End: 2024-10-31

## 2024-10-29 ASSESSMENT — ENCOUNTER SYMPTOMS
NEUROLOGICAL NEGATIVE: 1
CARDIOVASCULAR NEGATIVE: 1
ARTHRALGIAS: 1
EYES NEGATIVE: 1
ENDOCRINE COMMENTS: T2DM
CONSTIPATION: 1
NECK NEGATIVE: 1
CONSTITUTIONAL NEGATIVE: 1
RESPIRATORY NEGATIVE: 1
MYALGIAS: 1

## 2024-10-29 ASSESSMENT — DUKE ACTIVITY SCORE INDEX (DASI)
CAN YOU CLIMB A FLIGHT OF STAIRS OR WALK UP A HILL: YES
CAN YOU DO LIGHT WORK AROUND THE HOUSE LIKE DUSTING OR WASHING DISHES: YES
CAN YOU PARTICIPATE IN STRENOUS SPORTS LIKE SWIMMING, SINGLES TENNIS, FOOTBALL, BASKETBALL, OR SKIING: NO
CAN YOU HAVE SEXUAL RELATIONS: NO
CAN YOU WALK A BLOCK OR TWO ON LEVEL GROUND: YES
CAN YOU PARTICIPATE IN MODERATE RECREATIONAL ACTIVITIES LIKE GOLF, BOWLING, DANCING, DOUBLES TENNIS OR THROWING A BASEBALL OR FOOTBALL: NO
DASI METS SCORE: 5.1
CAN YOU TAKE CARE OF YOURSELF (EAT, DRESS, BATHE, OR USE TOILET): YES
CAN YOU DO HEAVY WORK AROUND THE HOUSE LIKE SCRUBBING FLOORS OR LIFTING AND MOVING HEAVY FURNITURE: NO
CAN YOU DO MODERATE WORK AROUND THE HOUSE LIKE VACUUMING, SWEEPING FLOORS OR CARRYING GROCERIES: YES
CAN YOU WALK INDOORS, SUCH AS AROUND YOUR HOUSE: YES
TOTAL_SCORE: 18.95
CAN YOU DO YARD WORK LIKE RAKING LEAVES, WEEDING OR PUSHING A MOWER: NO
CAN YOU RUN A SHORT DISTANCE: NO

## 2024-10-29 ASSESSMENT — LIFESTYLE VARIABLES: SMOKING_STATUS: NONSMOKER

## 2024-10-29 NOTE — H&P (VIEW-ONLY)
CPM/PAT Evaluation       Name: Gabby Madrid (Gabby Madrid)  /Age: 1950/74 y.o.     In-Person       Chief Complaint: preop eval for L4-S1 extension revision fusion w/ Dr Casillas on     HPI  Patient is a 74 y F, with PMH s/f HTN, HLD, T2DM, Hep C, DDD s/p back surgery, Postlaminectomy syndrome, OA, Nephrolithiasis, and Depression. She is seen here in PAT clinic for preop eval and optimization prior to surgery with Dr Casillas on .    Past Medical History:   Diagnosis Date    Personal history of other diseases of the circulatory system 10/28/2015    History of hypertension    Personal history of other endocrine, nutritional and metabolic disease 2021    History of type 2 diabetes mellitus    Type 2 diabetes mellitus with diabetic neuropathy, unspecified (Multi) 10/28/2015    Diabetic neuropathy associated with type 2 diabetes mellitus       Past Surgical History:   Procedure Laterality Date    APPENDECTOMY  2015    Appendectomy    CT ANGIO NECK  2017    CT NECK ANGIO W AND WO IV CONTRAST 2017 [facility]    HYSTERECTOMY  2015    Hysterectomy    LUMBAR FUSION  2016    Lumbar Vertebral Fusion    MR HEAD ANGIO WO IV CONTRAST  2020    MR HEAD ANGIO WO IV CONTRAST 2020 [facility]    OTHER SURGICAL HISTORY  2016    Spinal Diskectomy Lumbar    OTHER SURGICAL HISTORY  2022    Hip replacement    TONSILLECTOMY  2015    Tonsillectomy    TOTAL KNEE ARTHROPLASTY  2016    Knee Replacement       Patient  has no history on file for sexual activity.    Family History   Problem Relation Name Age of Onset    Dementia Mother      Heart attack Father  63    Other (Cardiac Disorder) Father      Other (PPM) Maternal Grandfather      Other (Cardiac Disorder) Maternal Grandfather      Heart attack Half-Brother  43            Other (Cardiac Disorder) Half-Brother         Allergies   Allergen Reactions    Hydromorphone (Bulk) Itching     Iodinated Contrast Media Other and Rash     Pt. states it makes her develop a rash and feel hot    Sulindac Unknown and Rash     Increased blood count, liver malfunction       Prior to Admission medications    Medication Sig Start Date End Date Taking? Authorizing Provider   alpha tocopherol (Vitamin E) 670 mg (1,000 unit) capsule Take 1 capsule (1,000 Units) by mouth once daily.    Historical Provider, MD   amLODIPine (Norvasc) 5 mg tablet TAKE 1 TABLET EVERY DAY 2/29/24   Darren Brown DO   ascorbic acid, vitamin C, 1,000 mg ER tablet Take by mouth.    Historical Provider, MD   aspirin 81 mg EC tablet Take 1 tablet (81 mg) by mouth once daily. 4/2/21   Historical Provider, MD   atorvastatin (Lipitor) 20 mg tablet TAKE 1/2 TABLET EVERY DAY 2/1/24   Darren Brown DO   blood sugar diagnostic (OneTouch Verio test strips) strip 1 strip by percutaneous route once daily. Test blood sugar once daily. DX Code E11.9 Non Insulin Dependent 2/26/24 2/25/25  Darren Brown DO   blood-glucose meter misc Check blood sugars daily for diagnosis 11.9 non-insulin-dependent 3/5/24   Darren Brown DO   chlorhexidine (Hibiclens) 4 % external liquid Apply 1 Application topically once daily for 5 days. Use per CPM/PAT provided instructions 10/29/24 11/3/24  Ashely Patterson MD   chlorhexidine (Peridex) 0.12 % solution Use 15 mL in the mouth or throat once daily for 2 doses. 10/29/24 10/31/24  Ashely Patterson MD   cholecalciferol (Vitamin D-3) 50 mcg (2,000 unit) capsule Take by mouth.  Patient not taking: Reported on 10/23/2024    Historical Provider, MD   coenzyme Q-10 100 mg capsule Take 1 capsule (100 mg) by mouth once daily.  Patient not taking: Reported on 10/23/2024    Historical Provider, MD   cyanocobalamin (Vitamin B-12) 1,000 mcg/mL injection INJECT 1 ML AS DIRECTED EVERY 30 DAYS. 8/6/24   Darren Brown DO   cyanocobalamin (Vitamin B-12) 500 mcg tablet Take 1 tablet (500 mcg) by mouth once  daily.  Patient not taking: Reported on 10/23/2024    Historical Provider, MD   cyanocobalamin, vitamin B-12, (B-12 Compliance) 1,000 mcg/mL kit Inject 1 mL as directed every 30 (thirty) days.  Patient not taking: Reported on 10/23/2024 6/13/24   Darren Brown, DO   DULoxetine (Cymbalta) 60 mg DR capsule TAKE 1 CAPSULE EVERY DAY 9/30/24   Darren Brown DO   gabapentin (Neurontin) 300 mg capsule Take one(1) tablet three times daily.  Patient not taking: Reported on 10/29/2024 6/16/15   Historical Provider, MD   GaviLyte-G 236-22.74-6.74 -5.86 gram solution  2/27/24   Historical Provider, MD   hydroCHLOROthiazide (HYDRODiuril) 50 mg tablet Take 1 tablet (50 mg) by mouth once daily. 11/9/17   Historical Provider, MD   hydrocortisone 2.5 % cream Apply topically twice a day. Sparingly to affected areas.  Patient not taking: Reported on 10/23/2024 9/19/22   Historical Provider, MD   ibuprofen (Motrin IB) 200 mg tablet Take by mouth.  Patient not taking: Reported on 10/23/2024 1/28/08   Historical Provider, MD   multivitamin with minerals (multivit-min-iron fum-folic ac) tablet Take by mouth.    Historical Provider, MD   neomycin-polymyxin-dexAMETHasone (Maxitrol) 3.5mg/mL-10,000 unit/mL-0.1 % ophthalmic suspension INSTILL ONE DROP INTO RIGHT EYE THREE TIMES A DAY FOR 5 TO 7 DAYS 5/18/23   Historical Provider, MD   nystatin (Mycostatin) 100,000 unit/gram powder Apply 1 Application topically 2 times a day. 8/5/24 8/5/25  Darren Brown DO   OneTouch Delica Plus Lancet 33 gauge misc TEST EVERY DAY 10/7/22   Historical Provider, MD   oxybutynin (Ditropan) 5 mg tablet Take 1 tablet (5 mg) by mouth 2 times a day.  Patient not taking: Reported on 10/23/2024 10/15/24 10/15/25  Thang Castro MD   oxyCODONE-acetaminophen (Percocet) 5-325 mg tablet Take 1 tablet by mouth 3 times a day.    Historical Provider, MD   plecanatide (Trulance) tablet tablet Take 1 tablet (3 mg) by mouth twice a day. 5/22/23 8/20/23   Historical Provider, MD   polyethylene glycol (Glycolax) 17 gram/dose powder Take by mouth once daily.  Patient not taking: Mix of powder and drink. Reported on 10/23/2024    Historical Provider, MD   potassium chloride CR (K-Tab) 20 mEq ER tablet Take by mouth. 11/9/17   Historical Provider, MD   pregabalin (Lyrica) 150 mg capsule Take 1 capsule (150 mg) by mouth every 12 hours.  Patient not taking: Reported on 10/23/2024 12/12/23   Historical Provider, MD   traZODone (Desyrel) 50 mg tablet TAKE 1 TO 2 TABLETS AT BEDTIME AS NEEDED FOR SLEEP 10/7/24   Darren Brown,    triamcinolone (Kenalog) 0.1 % oral paste Apply 1 Application to teeth twice a day.  Patient not taking: Reported on 10/23/2024 9/9/13   Historical ProviderMD ALVARADO ROS:   Constitutional:   neg    Neuro/Psych:   neg    Eyes:   neg    Ears:   neg    Nose:   neg    Mouth:   neg    Throat:    dryness  Neck:   neg    Cardio:   neg    Respiratory:   neg    Endocrine:    T2DM  GI:    constipation  :   neg    Musculoskeletal:    LBP and DDD   arthralgias   myalgias  Hematologic:   neg    Skin:  neg        Physical Exam  Vitals and nursing note reviewed. Physical exam within normal limits.   Constitutional:       Appearance: Normal appearance.   HENT:      Head: Normocephalic.   Eyes:      Pupils: Pupils are equal, round, and reactive to light.   Cardiovascular:      Rate and Rhythm: Normal rate and regular rhythm.      Pulses: Normal pulses.      Heart sounds: Normal heart sounds.   Pulmonary:      Effort: Pulmonary effort is normal.      Breath sounds: Normal breath sounds.   Abdominal:      Palpations: Abdomen is soft.   Musculoskeletal:         General: Normal range of motion.      Cervical back: Normal range of motion.   Skin:     General: Skin is warm.      Capillary Refill: Capillary refill takes less than 2 seconds.   Neurological:      Mental Status: She is alert and oriented to person, place, and time. Mental status is at baseline.    Psychiatric:         Mood and Affect: Mood normal.         Behavior: Behavior normal.         Thought Content: Thought content normal.         Judgment: Judgment normal.          PAT AIRWAY:   Airway:     Mallampati::  I    TM distance::  >3 FB    Neck ROM::  Full      Visit Vitals  /73   Pulse 72   Temp 36.7 °C (98 °F)       DASI Risk Score      Flowsheet Row Pre-Admission Testing [facility] 10[facility]29/[facility]   Can you take care of yourself (eat, dress, bathe, or use toilet)?  2.75 filed at 10/29/2024 1351   Can you walk indoors, such as around your house? 1.75 filed at 10/29/2024 1351   Can you walk a block or two on level ground?  2.75 filed at 10/29/2024 1351   Can you climb a flight of stairs or walk up a hill? 5.5 filed at 10/29/2024 1351   Can you run a short distance? 0 filed at 10/29/2024 1351   Can you do light work around the house like dusting or washing dishes? 2.7 filed at 10/29/2024 1351   Can you do moderate work around the house like vacuuming, sweeping floors or carrying groceries? 3.5 filed at 10/29/2024 1351   Can you do heavy work around the house like scrubbing floors or lifting and moving heavy furniture?  0 filed at 10/29/2024 1351   Can you do yard work like raking leaves, weeding or pushing a mower? 0 filed at 10/29/2024 1351   Can you have sexual relations? 0 filed at 10/29/2024 1351   Can you participate in moderate recreational activities like golf, bowling, dancing, doubles tennis or throwing a baseball or football? 0 filed at 10/29/2024 1351   Can you participate in strenous sports like swimming, singles tennis, football, basketball, or skiing? 0 filed at 10/29/2024 1351   DASI SCORE 18.95 filed at 10/29/2024 1351   METS Score (Will be calculated only when all the questions are answered) 5.1 filed at 10/29/2024 1351          Caprini DVT Assessment    No data to display       Modified Frailty Index    No data to display       CHADS2 Stroke Risk  Current as of  54 minutes ago        N/A 3 to 100%: High Risk   2 to < 3%: Medium Risk   0 to < 2%: Low Risk     Last Change: N/A          This score determines the patient's risk of having a stroke if the patient has atrial fibrillation.        This score is not applicable to this patient. Components are not calculated.          Revised Cardiac Risk Index    No data to display       Apfel Simplified Score    No data to display       Risk Analysis Index Results This Encounter    No data found in the last 10 encounters.       Stop Bang Score      Flowsheet Row Pre-Admission Testing from 10/29/2024 in [facility]   Do you snore loudly? 1 filed at 10/29/2024 1400   Do you often feel tired or fatigued after your sleep? 1 filed at 10/29/2024 1400   Has anyone ever observed you stop breathing in your sleep? 0 filed at 10/29/2024 1400   Do you have or are you being treated for high blood pressure? 1 filed at 10/29/2024 1400   Recent BMI (Calculated) 24.9 filed at 10/29/2024 1400   Is BMI greater than 35 kg/m2? 0=No filed at 10/29/2024 1400   Age older than 50 years old? 1=Yes filed at 10/29/2024 1400   Is your neck circumference greater than 17 inches (Male) or 16 inches (Female)? 0 filed at 10/29/2024 1400   Gender - Male 0=No filed at 10/29/2024 1400   STOP-BANG Total Score 4 filed at 10/29/2024 1400          Prodigy: High Risk  Total Score: 15              Prodigy Age Score      Prodigy Previous Opioid Use Score           ARISCAT Score for Postoperative Pulmonary Complications    No data to display       Bryson Perioperative Risk for Myocardial Infarction or Cardiac Arrest (YOU)    No data to display     No results found for this or any previous visit (from the past 24 hours).     Assessment and Plan:  Patient is a 74 y F, with PMH s/f HTN, HLD, T2DM, Hep C, DDD s/p back surgery, Postlaminectomy syndrome, OA, Nephrolithiasis, and Depression. She is seen here in PAT clinic for preop eval and optimization prior to  surgery with Dr Casillas on 11/5.    Neuro:  Depression - not on meds. Vasc UD carotids (2017): BL carotids < 50% stenosis.  Patient is at increased risk for perioperative CVA secondary to  HTN, DM, increased age, operative time > 2.5 hours    HEENT:  No HEENT diagnosis or significant findings on chart review or clinical presentation and evaluation. No further preoperative testing/intervention indicated at this time.    Cardiovascular:  HTN - on hydrochlorothiazide, HLD - on Atorvastatin. Prior concern for CAD - was following with Cardiologist Dr Roberts who ordered EKG (NSR) and Echo (LVEF 60-65%) in 2021 and put her on ASA 81mg as a preventative measure (as such we discussed stopping ASA 5 days prior to surgery). No prior history of MI, stents, HF, CHACKO. No further preoperative testing or intervention is indicated at this time.  METS: 5.1  RCRI: 0 points, 3.9%  risk for postoperative MACE   YOU: 0.1% risk for 30 day postoperative MACE    Pulmonary:  No pulmonary diagnosis, however patient is at increased risk of perioperative complications secondary to  age > 60, major surgery, duration of surgery > 2 hours, types of anesthetic  Stop Bang score is 4 placing patient at moderate risk for DANDY  ARISCAT: 26-44 points, 13.3% risk of in-hospital postoperative pulmonary complication  PRODIGY: Moderate risk for opioid induced respiratory depression  Pumonary toilet education discussed, patient also provided deep breathing exercises and incentive spirometry educational handout    Renal:   Nephrolithiasis/ Renal mass - being followed by Dr Adam - controlled on hydrochlorothiazide and potassium supplements.  Pt at High risk for perioperative ANJEL based on Dynamic Predictive Scoring Tool for Perioperative ANJEL     Endocrine:  T2DM - was on metformin in the past, per patient report her physician had her discontinue the metformin as her blood sugars normalized many years ago. HbA1c in August of this year 6.3. Advised patient to see  PCP and address T2DM management going forward.    Hematologic:  No hematologic diagnosis, however patient is at an increased risk for DVT  Caprini Score 8, patient at High risk for perioperative DVT.  Patient provided with VTE education/handout.    Gastrointestinal:   GERD - not on meds  Eat-10 score 0  Apfel 3    Infectious disease:   Hep C - LFTs in August - WNL  Prescription provided for CHG body wash and dental rinse. CHG use instructions reviewed and provided to patient.  Staph screen collected    Musculoskeletal:   Degenerative Disc Disease/ LBP/ Prior Back surgery/ Postlaminectomy syndrome - scheduled for revision extension L4-S1 with Dr Casillas on 11/5    Anesthesia/Airway:  No anesthesia complications      Medication instructions and NPO guidelines reviewed with the patient.  All questions or concerns discussed and addressed.      Patient seen and staffed with Dr. Patterson

## 2024-10-31 LAB — STAPHYLOCOCCUS SPEC CULT: ABNORMAL

## 2024-11-05 ENCOUNTER — ANESTHESIA (OUTPATIENT)
Dept: OPERATING ROOM | Facility: HOSPITAL | Age: 74
End: 2024-11-05
Payer: MEDICARE

## 2024-11-05 ENCOUNTER — HOSPITAL ENCOUNTER (INPATIENT)
Facility: HOSPITAL | Age: 74
Discharge: HOME HEALTH | End: 2024-11-05
Attending: STUDENT IN AN ORGANIZED HEALTH CARE EDUCATION/TRAINING PROGRAM | Admitting: STUDENT IN AN ORGANIZED HEALTH CARE EDUCATION/TRAINING PROGRAM
Payer: MEDICARE

## 2024-11-05 ENCOUNTER — APPOINTMENT (OUTPATIENT)
Dept: RADIOLOGY | Facility: HOSPITAL | Age: 74
DRG: 428 | End: 2024-11-05
Payer: MEDICARE

## 2024-11-05 DIAGNOSIS — M54.16 LUMBAR RADICULOPATHY: Primary | ICD-10-CM

## 2024-11-05 DIAGNOSIS — G89.18 ACUTE POST-OPERATIVE PAIN: ICD-10-CM

## 2024-11-05 DIAGNOSIS — Z98.890 S/P SPINAL SURGERY: ICD-10-CM

## 2024-11-05 DIAGNOSIS — Z74.09 IMPAIRED FUNCTIONAL MOBILITY AND ENDURANCE: ICD-10-CM

## 2024-11-05 PROBLEM — G93.2 PSEUDOTUMOR CEREBRI: Status: ACTIVE | Noted: 2024-11-05

## 2024-11-05 PROBLEM — J30.2 SEASONAL ALLERGIES: Status: ACTIVE | Noted: 2024-11-05

## 2024-11-05 PROBLEM — K27.9 PUD (PEPTIC ULCER DISEASE): Status: ACTIVE | Noted: 2024-11-05

## 2024-11-05 PROBLEM — C64.9 RENAL CANCER (MULTI): Status: ACTIVE | Noted: 2023-03-08

## 2024-11-05 PROBLEM — G89.29 CHRONIC NECK PAIN: Status: ACTIVE | Noted: 2024-11-05

## 2024-11-05 PROBLEM — M26.629 CHRONIC TMJ PAIN: Status: ACTIVE | Noted: 2024-11-05

## 2024-11-05 PROBLEM — I73.9 PERIPHERAL VASCULAR DISEASE (CMS-HCC): Status: ACTIVE | Noted: 2024-11-05

## 2024-11-05 PROBLEM — N40.0 BPH (BENIGN PROSTATIC HYPERPLASIA): Status: ACTIVE | Noted: 2024-11-05

## 2024-11-05 PROBLEM — Z90.710 HISTORY OF HYSTERECTOMY: Status: ACTIVE | Noted: 2024-11-05

## 2024-11-05 PROBLEM — C50.919 BREAST CANCER (MULTI): Status: ACTIVE | Noted: 2024-11-05

## 2024-11-05 PROBLEM — M54.2 CHRONIC NECK PAIN: Status: ACTIVE | Noted: 2024-11-05

## 2024-11-05 PROBLEM — O00.00: Status: ACTIVE | Noted: 2024-11-05

## 2024-11-05 PROBLEM — G89.29 CHRONIC TMJ PAIN: Status: ACTIVE | Noted: 2024-11-05

## 2024-11-05 PROBLEM — H54.7 VISION LOSS: Status: ACTIVE | Noted: 2024-11-05

## 2024-11-05 PROBLEM — J45.909 ASTHMA: Status: ACTIVE | Noted: 2024-11-05

## 2024-11-05 LAB
ABO GROUP (TYPE) IN BLOOD: NORMAL
ANTIBODY SCREEN: NORMAL
GLUCOSE BLD MANUAL STRIP-MCNC: 113 MG/DL (ref 74–99)
GLUCOSE BLD MANUAL STRIP-MCNC: 146 MG/DL (ref 74–99)
GLUCOSE BLD MANUAL STRIP-MCNC: 203 MG/DL (ref 74–99)
RH FACTOR (ANTIGEN D): NORMAL

## 2024-11-05 PROCEDURE — 2500000005 HC RX 250 GENERAL PHARMACY W/O HCPCS: Performed by: STUDENT IN AN ORGANIZED HEALTH CARE EDUCATION/TRAINING PROGRAM

## 2024-11-05 PROCEDURE — 2500000004 HC RX 250 GENERAL PHARMACY W/ HCPCS (ALT 636 FOR OP/ED): Performed by: ANESTHESIOLOGY

## 2024-11-05 PROCEDURE — 63052 LAM FACETC/FRMT ARTHRD LUM 1: CPT | Performed by: STUDENT IN AN ORGANIZED HEALTH CARE EDUCATION/TRAINING PROGRAM

## 2024-11-05 PROCEDURE — 82947 ASSAY GLUCOSE BLOOD QUANT: CPT

## 2024-11-05 PROCEDURE — 7100000024 HC EXTENDED STAY RECOVERY PER MINUTE- PACU: Performed by: STUDENT IN AN ORGANIZED HEALTH CARE EDUCATION/TRAINING PROGRAM

## 2024-11-05 PROCEDURE — 2500000005 HC RX 250 GENERAL PHARMACY W/O HCPCS

## 2024-11-05 PROCEDURE — 2780000003 HC OR 278 NO HCPCS: Performed by: STUDENT IN AN ORGANIZED HEALTH CARE EDUCATION/TRAINING PROGRAM

## 2024-11-05 PROCEDURE — 3700000002 HC GENERAL ANESTHESIA TIME - EACH INCREMENTAL 1 MINUTE: Performed by: STUDENT IN AN ORGANIZED HEALTH CARE EDUCATION/TRAINING PROGRAM

## 2024-11-05 PROCEDURE — 22633 ARTHRD CMBN 1NTRSPC LUMBAR: CPT | Performed by: STUDENT IN AN ORGANIZED HEALTH CARE EDUCATION/TRAINING PROGRAM

## 2024-11-05 PROCEDURE — 3700000001 HC GENERAL ANESTHESIA TIME - INITIAL BASE CHARGE: Performed by: STUDENT IN AN ORGANIZED HEALTH CARE EDUCATION/TRAINING PROGRAM

## 2024-11-05 PROCEDURE — 2500000001 HC RX 250 WO HCPCS SELF ADMINISTERED DRUGS (ALT 637 FOR MEDICARE OP): Performed by: STUDENT IN AN ORGANIZED HEALTH CARE EDUCATION/TRAINING PROGRAM

## 2024-11-05 PROCEDURE — 7100000001 HC RECOVERY ROOM TIME - INITIAL BASE CHARGE: Performed by: STUDENT IN AN ORGANIZED HEALTH CARE EDUCATION/TRAINING PROGRAM

## 2024-11-05 PROCEDURE — C1889 IMPLANT/INSERT DEVICE, NOC: HCPCS | Performed by: STUDENT IN AN ORGANIZED HEALTH CARE EDUCATION/TRAINING PROGRAM

## 2024-11-05 PROCEDURE — 2500000002 HC RX 250 W HCPCS SELF ADMINISTERED DRUGS (ALT 637 FOR MEDICARE OP, ALT 636 FOR OP/ED): Performed by: STUDENT IN AN ORGANIZED HEALTH CARE EDUCATION/TRAINING PROGRAM

## 2024-11-05 PROCEDURE — 61783 SCAN PROC SPINAL: CPT | Performed by: STUDENT IN AN ORGANIZED HEALTH CARE EDUCATION/TRAINING PROGRAM

## 2024-11-05 PROCEDURE — 2500000004 HC RX 250 GENERAL PHARMACY W/ HCPCS (ALT 636 FOR OP/ED)

## 2024-11-05 PROCEDURE — 22614 ARTHRD PST TQ 1NTRSPC EA ADD: CPT | Performed by: STUDENT IN AN ORGANIZED HEALTH CARE EDUCATION/TRAINING PROGRAM

## 2024-11-05 PROCEDURE — 36415 COLL VENOUS BLD VENIPUNCTURE: CPT | Performed by: STUDENT IN AN ORGANIZED HEALTH CARE EDUCATION/TRAINING PROGRAM

## 2024-11-05 PROCEDURE — 7100000002 HC RECOVERY ROOM TIME - EACH INCREMENTAL 1 MINUTE: Performed by: STUDENT IN AN ORGANIZED HEALTH CARE EDUCATION/TRAINING PROGRAM

## 2024-11-05 PROCEDURE — 7100000011 HC EXTENDED STAY RECOVERY HOURLY - NURSING UNIT

## 2024-11-05 PROCEDURE — 2500000005 HC RX 250 GENERAL PHARMACY W/O HCPCS: Performed by: ANESTHESIOLOGY

## 2024-11-05 PROCEDURE — 22853 INSJ BIOMECHANICAL DEVICE: CPT | Performed by: STUDENT IN AN ORGANIZED HEALTH CARE EDUCATION/TRAINING PROGRAM

## 2024-11-05 PROCEDURE — 2720000007 HC OR 272 NO HCPCS: Performed by: STUDENT IN AN ORGANIZED HEALTH CARE EDUCATION/TRAINING PROGRAM

## 2024-11-05 PROCEDURE — C1713 ANCHOR/SCREW BN/BN,TIS/BN: HCPCS | Performed by: STUDENT IN AN ORGANIZED HEALTH CARE EDUCATION/TRAINING PROGRAM

## 2024-11-05 PROCEDURE — 3600000009 HC OR TIME - EACH INCREMENTAL 1 MINUTE - PROCEDURE LEVEL FOUR: Performed by: STUDENT IN AN ORGANIZED HEALTH CARE EDUCATION/TRAINING PROGRAM

## 2024-11-05 PROCEDURE — 63047 LAM FACETEC & FORAMOT LUMBAR: CPT | Performed by: STUDENT IN AN ORGANIZED HEALTH CARE EDUCATION/TRAINING PROGRAM

## 2024-11-05 PROCEDURE — 3600000004 HC OR TIME - INITIAL BASE CHARGE - PROCEDURE LEVEL FOUR: Performed by: STUDENT IN AN ORGANIZED HEALTH CARE EDUCATION/TRAINING PROGRAM

## 2024-11-05 PROCEDURE — 86901 BLOOD TYPING SEROLOGIC RH(D): CPT | Performed by: STUDENT IN AN ORGANIZED HEALTH CARE EDUCATION/TRAINING PROGRAM

## 2024-11-05 PROCEDURE — 22842 INSERT SPINE FIXATION DEVICE: CPT | Performed by: STUDENT IN AN ORGANIZED HEALTH CARE EDUCATION/TRAINING PROGRAM

## 2024-11-05 PROCEDURE — 2500000004 HC RX 250 GENERAL PHARMACY W/ HCPCS (ALT 636 FOR OP/ED): Performed by: STUDENT IN AN ORGANIZED HEALTH CARE EDUCATION/TRAINING PROGRAM

## 2024-11-05 PROCEDURE — 2500000001 HC RX 250 WO HCPCS SELF ADMINISTERED DRUGS (ALT 637 FOR MEDICARE OP): Performed by: ANESTHESIOLOGY

## 2024-11-05 DEVICE — SIGNIFY BIOACTIVE CRUNCH, 10CC
Type: IMPLANTABLE DEVICE | Site: BACK | Status: FUNCTIONAL
Brand: SIGNIFY

## 2024-11-05 DEVICE — SCREW, RELINE LOCK, 5.5MM OPEN TULIP: Type: IMPLANTABLE DEVICE | Site: BACK | Status: FUNCTIONAL

## 2024-11-05 DEVICE — SCREW, RELINE-O, 7.5X40MM 2S POLYAXIAL: Type: IMPLANTABLE DEVICE | Site: BACK | Status: FUNCTIONAL

## 2024-11-05 DEVICE — IMPLANTABLE DEVICE: Type: IMPLANTABLE DEVICE | Site: BACK | Status: FUNCTIONAL

## 2024-11-05 DEVICE — SCREW, RELINE-O, 7.5X45MM 2S POLYAXIAL: Type: IMPLANTABLE DEVICE | Site: BACK | Status: FUNCTIONAL

## 2024-11-05 DEVICE — MOD-EX PL, 7X11X28MM 17°
Type: IMPLANTABLE DEVICE | Site: BACK | Status: FUNCTIONAL
Brand: MODULUS EXPANDABLE

## 2024-11-05 RX ORDER — OXYCODONE AND ACETAMINOPHEN 5; 325 MG/1; MG/1
1 TABLET ORAL EVERY 4 HOURS PRN
Status: DISCONTINUED | OUTPATIENT
Start: 2024-11-05 | End: 2024-11-06 | Stop reason: HOSPADM

## 2024-11-05 RX ORDER — LIDOCAINE HYDROCHLORIDE 10 MG/ML
0.1 INJECTION, SOLUTION INFILTRATION; PERINEURAL ONCE
Status: DISCONTINUED | OUTPATIENT
Start: 2024-11-05 | End: 2024-11-06 | Stop reason: HOSPADM

## 2024-11-05 RX ORDER — SODIUM CHLORIDE 0.9 G/100ML
IRRIGANT IRRIGATION AS NEEDED
Status: DISCONTINUED | OUTPATIENT
Start: 2024-11-05 | End: 2024-11-05 | Stop reason: HOSPADM

## 2024-11-05 RX ORDER — PHENYLEPHRINE HCL IN 0.9% NACL 0.4MG/10ML
SYRINGE (ML) INTRAVENOUS AS NEEDED
Status: DISCONTINUED | OUTPATIENT
Start: 2024-11-05 | End: 2024-11-05

## 2024-11-05 RX ORDER — ROCURONIUM BROMIDE 10 MG/ML
INJECTION, SOLUTION INTRAVENOUS AS NEEDED
Status: DISCONTINUED | OUTPATIENT
Start: 2024-11-05 | End: 2024-11-05

## 2024-11-05 RX ORDER — MORPHINE SULFATE 4 MG/ML
4 INJECTION INTRAVENOUS EVERY 5 MIN PRN
Status: DISCONTINUED | OUTPATIENT
Start: 2024-11-05 | End: 2024-11-06 | Stop reason: HOSPADM

## 2024-11-05 RX ORDER — FENTANYL CITRATE 50 UG/ML
12.5 INJECTION, SOLUTION INTRAMUSCULAR; INTRAVENOUS EVERY 5 MIN PRN
Status: DISCONTINUED | OUTPATIENT
Start: 2024-11-05 | End: 2024-11-06 | Stop reason: HOSPADM

## 2024-11-05 RX ORDER — LIDOCAINE HYDROCHLORIDE AND EPINEPHRINE 5; 5 MG/ML; UG/ML
INJECTION, SOLUTION INFILTRATION; PERINEURAL AS NEEDED
Status: DISCONTINUED | OUTPATIENT
Start: 2024-11-05 | End: 2024-11-05 | Stop reason: HOSPADM

## 2024-11-05 RX ORDER — ACETAMINOPHEN 325 MG/1
975 TABLET ORAL ONCE
Status: COMPLETED | OUTPATIENT
Start: 2024-11-05 | End: 2024-11-05

## 2024-11-05 RX ORDER — PREGABALIN 75 MG/1
150 CAPSULE ORAL EVERY 12 HOURS
Status: DISPENSED | OUTPATIENT
Start: 2024-11-05

## 2024-11-05 RX ORDER — FENTANYL CITRATE 50 UG/ML
INJECTION, SOLUTION INTRAMUSCULAR; INTRAVENOUS AS NEEDED
Status: DISCONTINUED | OUTPATIENT
Start: 2024-11-05 | End: 2024-11-05

## 2024-11-05 RX ORDER — ONDANSETRON HYDROCHLORIDE 2 MG/ML
INJECTION, SOLUTION INTRAVENOUS AS NEEDED
Status: DISCONTINUED | OUTPATIENT
Start: 2024-11-05 | End: 2024-11-05

## 2024-11-05 RX ORDER — SODIUM CHLORIDE, SODIUM LACTATE, POTASSIUM CHLORIDE, CALCIUM CHLORIDE 600; 310; 30; 20 MG/100ML; MG/100ML; MG/100ML; MG/100ML
50 INJECTION, SOLUTION INTRAVENOUS CONTINUOUS
Status: ACTIVE | OUTPATIENT
Start: 2024-11-05 | End: 2024-11-06

## 2024-11-05 RX ORDER — METOCLOPRAMIDE HYDROCHLORIDE 5 MG/ML
10 INJECTION INTRAMUSCULAR; INTRAVENOUS ONCE AS NEEDED
Status: DISCONTINUED | OUTPATIENT
Start: 2024-11-05 | End: 2024-11-06 | Stop reason: HOSPADM

## 2024-11-05 RX ORDER — CYCLOBENZAPRINE HCL 10 MG
10 TABLET ORAL 3 TIMES DAILY
Status: DISPENSED | OUTPATIENT
Start: 2024-11-05

## 2024-11-05 RX ORDER — CEFAZOLIN 1 G/1
INJECTION, POWDER, FOR SOLUTION INTRAVENOUS AS NEEDED
Status: DISCONTINUED | OUTPATIENT
Start: 2024-11-05 | End: 2024-11-05

## 2024-11-05 RX ORDER — MIDAZOLAM HYDROCHLORIDE 1 MG/ML
INJECTION INTRAMUSCULAR; INTRAVENOUS AS NEEDED
Status: DISCONTINUED | OUTPATIENT
Start: 2024-11-05 | End: 2024-11-05

## 2024-11-05 RX ORDER — LABETALOL HYDROCHLORIDE 5 MG/ML
5 INJECTION, SOLUTION INTRAVENOUS EVERY 10 MIN PRN
Status: DISCONTINUED | OUTPATIENT
Start: 2024-11-05 | End: 2024-11-06 | Stop reason: HOSPADM

## 2024-11-05 RX ORDER — OXYCODONE HYDROCHLORIDE 5 MG/1
5 TABLET ORAL EVERY 4 HOURS PRN
Status: DISPENSED | OUTPATIENT
Start: 2024-11-05

## 2024-11-05 RX ORDER — OXYCODONE HYDROCHLORIDE 5 MG/1
10 TABLET ORAL EVERY 4 HOURS PRN
Status: DISCONTINUED | OUTPATIENT
Start: 2024-11-05 | End: 2024-11-06 | Stop reason: HOSPADM

## 2024-11-05 RX ORDER — FAMOTIDINE 10 MG/ML
20 INJECTION INTRAVENOUS ONCE
Status: COMPLETED | OUTPATIENT
Start: 2024-11-05 | End: 2024-11-05

## 2024-11-05 RX ORDER — GLYCOPYRROLATE 0.2 MG/ML
INJECTION INTRAMUSCULAR; INTRAVENOUS AS NEEDED
Status: DISCONTINUED | OUTPATIENT
Start: 2024-11-05 | End: 2024-11-05

## 2024-11-05 RX ORDER — METHADONE IN SOD CHLOR,ISO-OSM 10 MG/ML
SYRINGE (ML) INTRAVENOUS AS NEEDED
Status: DISCONTINUED | OUTPATIENT
Start: 2024-11-05 | End: 2024-11-05

## 2024-11-05 RX ORDER — CELECOXIB 200 MG/1
400 CAPSULE ORAL ONCE
Status: COMPLETED | OUTPATIENT
Start: 2024-11-05 | End: 2024-11-05

## 2024-11-05 RX ORDER — SODIUM CHLORIDE, SODIUM LACTATE, POTASSIUM CHLORIDE, CALCIUM CHLORIDE 600; 310; 30; 20 MG/100ML; MG/100ML; MG/100ML; MG/100ML
INJECTION, SOLUTION INTRAVENOUS CONTINUOUS PRN
Status: DISCONTINUED | OUTPATIENT
Start: 2024-11-05 | End: 2024-11-05

## 2024-11-05 RX ORDER — DROPERIDOL 2.5 MG/ML
0.62 INJECTION, SOLUTION INTRAMUSCULAR; INTRAVENOUS ONCE AS NEEDED
Status: DISCONTINUED | OUTPATIENT
Start: 2024-11-05 | End: 2024-11-06 | Stop reason: HOSPADM

## 2024-11-05 RX ORDER — NORETHINDRONE AND ETHINYL ESTRADIOL 0.5-0.035
KIT ORAL AS NEEDED
Status: DISCONTINUED | OUTPATIENT
Start: 2024-11-05 | End: 2024-11-05

## 2024-11-05 RX ORDER — LIDOCAINE HCL/PF 100 MG/5ML
SYRINGE (ML) INTRAVENOUS AS NEEDED
Status: DISCONTINUED | OUTPATIENT
Start: 2024-11-05 | End: 2024-11-05

## 2024-11-05 RX ORDER — MORPHINE SULFATE 4 MG/ML
2 INJECTION INTRAVENOUS EVERY 5 MIN PRN
Status: DISCONTINUED | OUTPATIENT
Start: 2024-11-05 | End: 2024-11-06 | Stop reason: HOSPADM

## 2024-11-05 RX ORDER — TRANEXAMIC ACID 650 MG/1
1300 TABLET ORAL ONCE
Status: COMPLETED | OUTPATIENT
Start: 2024-11-05 | End: 2024-11-05

## 2024-11-05 RX ORDER — PROPOFOL 10 MG/ML
INJECTION, EMULSION INTRAVENOUS AS NEEDED
Status: DISCONTINUED | OUTPATIENT
Start: 2024-11-05 | End: 2024-11-05

## 2024-11-05 RX ORDER — ACETAMINOPHEN 325 MG/1
650 TABLET ORAL EVERY 4 HOURS PRN
Status: DISCONTINUED | OUTPATIENT
Start: 2024-11-05 | End: 2024-11-06 | Stop reason: HOSPADM

## 2024-11-05 RX ORDER — HYDRALAZINE HYDROCHLORIDE 20 MG/ML
5 INJECTION INTRAMUSCULAR; INTRAVENOUS EVERY 10 MIN PRN
Status: DISCONTINUED | OUTPATIENT
Start: 2024-11-05 | End: 2024-11-06 | Stop reason: HOSPADM

## 2024-11-05 RX ORDER — ALBUTEROL SULFATE 0.83 MG/ML
2.5 SOLUTION RESPIRATORY (INHALATION) ONCE AS NEEDED
Status: DISCONTINUED | OUTPATIENT
Start: 2024-11-05 | End: 2024-11-06 | Stop reason: HOSPADM

## 2024-11-05 RX ORDER — ACETAMINOPHEN 325 MG/1
650 TABLET ORAL EVERY 6 HOURS
Status: DISPENSED | OUTPATIENT
Start: 2024-11-05

## 2024-11-05 SDOH — HEALTH STABILITY: MENTAL HEALTH: CURRENT SMOKER: 0

## 2024-11-05 ASSESSMENT — PAIN - FUNCTIONAL ASSESSMENT
PAIN_FUNCTIONAL_ASSESSMENT: 0-10
PAIN_FUNCTIONAL_ASSESSMENT: UNABLE TO SELF-REPORT
PAIN_FUNCTIONAL_ASSESSMENT: 0-10
PAIN_FUNCTIONAL_ASSESSMENT: UNABLE TO SELF-REPORT
PAIN_FUNCTIONAL_ASSESSMENT: 0-10

## 2024-11-05 ASSESSMENT — PAIN SCALES - GENERAL
PAINLEVEL_OUTOF10: 6
PAINLEVEL_OUTOF10: 10 - WORST POSSIBLE PAIN
PAINLEVEL_OUTOF10: 4
PAINLEVEL_OUTOF10: 7
PAINLEVEL_OUTOF10: 5 - MODERATE PAIN
PAINLEVEL_OUTOF10: 5 - MODERATE PAIN
PAINLEVEL_OUTOF10: 7
PAINLEVEL_OUTOF10: 10 - WORST POSSIBLE PAIN
PAINLEVEL_OUTOF10: 5 - MODERATE PAIN
PAINLEVEL_OUTOF10: 6
PAINLEVEL_OUTOF10: 4
PAINLEVEL_OUTOF10: 5 - MODERATE PAIN
PAINLEVEL_OUTOF10: 5 - MODERATE PAIN
PAINLEVEL_OUTOF10: 8
PAINLEVEL_OUTOF10: 5 - MODERATE PAIN
PAINLEVEL_OUTOF10: 6
PAINLEVEL_OUTOF10: 5 - MODERATE PAIN
PAINLEVEL_OUTOF10: 5 - MODERATE PAIN
PAINLEVEL_OUTOF10: 4
PAINLEVEL_OUTOF10: 6
PAINLEVEL_OUTOF10: 5 - MODERATE PAIN
PAINLEVEL_OUTOF10: 10 - WORST POSSIBLE PAIN
PAINLEVEL_OUTOF10: 5 - MODERATE PAIN
PAINLEVEL_OUTOF10: 4

## 2024-11-05 ASSESSMENT — PAIN DESCRIPTION - DESCRIPTORS
DESCRIPTORS: BURNING
DESCRIPTORS: BURNING
DESCRIPTORS: ACHING
DESCRIPTORS: BURNING
DESCRIPTORS: BURNING;OTHER (COMMENT)
DESCRIPTORS: BURNING

## 2024-11-05 ASSESSMENT — COLUMBIA-SUICIDE SEVERITY RATING SCALE - C-SSRS
6. HAVE YOU EVER DONE ANYTHING, STARTED TO DO ANYTHING, OR PREPARED TO DO ANYTHING TO END YOUR LIFE?: NO
1. IN THE PAST MONTH, HAVE YOU WISHED YOU WERE DEAD OR WISHED YOU COULD GO TO SLEEP AND NOT WAKE UP?: NO
2. HAVE YOU ACTUALLY HAD ANY THOUGHTS OF KILLING YOURSELF?: NO

## 2024-11-05 ASSESSMENT — PAIN DESCRIPTION - LOCATION: LOCATION: BACK

## 2024-11-05 NOTE — ANESTHESIA PREPROCEDURE EVALUATION
Patient: Gabby Madrid    Procedure Information       Date/Time: 11/05/24 1050    Procedure: L4-S1 Extension Revision Fusion, L4-L5 Transforaminal Lumbar Interbody Fusion - 07670, 87541, 51395, 22765, 45136, 03404, 51869    Location: [facility] OR 25 / [facility] OR    Surgeons: Jacoby Casillas MD            Relevant Problems   Anesthesia (within normal limits)      Cardiac   (+) Atypical chest pain   (+) Burning in the chest   (+) Chest pain, atypical   (+) Essential hypertension   (+) Hypercholesterolemia   (+) Peripheral vascular disease (CMS-HCC) (Hx Left vertebral artery occlusion, pt states no symptoms)   (-) CAD (coronary artery disease) (Pt denies)      Pulmonary   (+) Asthma (Hx acute bronchitis)   (+) SOB (shortness of breath) on exertion (Able to climb 1 flight of stairs without stopping)      Neuro  Hx dizziness   (+) Cervical radiculopathy at C6   (+) Depression with anxiety (Hx insomnia)   (+) Depressive disorder   (+) Headache, chronic daily   (+) Left cervical radiculopathy   (+) Lumbar radiculitis   (+) Lumbar radiculopathy   (+) Pseudotumor cerebri (Hx normal-pressure hydrocephalus)   (+) Radiculitis   (+) Sciatica of left side      GI   (+) GERD without esophagitis   (+) Gastrointestinal hemorrhage with melena (Hx black stools/melena (2021))   (+) PUD (peptic ulcer disease) (Hx black stools/melena (2021), with epigastric pain/H.pylori infection)      /Renal  Hx bladder outlet obstruction;  Hx hematuria;  Hx R renal mass   (+) Calculus of right kidney   (+) Kidney stone   (+) Renal calculus   (+) Renal cancer (Multi) (Pt states she has very small and deep focus seen on imaging, most likely CA but cannot biopsy due to location, being followed closely)   (+) UTI (urinary tract infection)      Liver  Hx liver mass -- mild, no symptoms or problems   (+) Chronic hepatitis C without hepatic coma (Multi)      Endocrine  Hx vit. B12 deficiency   (+) Diabetes mellitus type 2, controlled  (Multi) (Blood glucose in AM of surgery = 113)   (+) Neuropathy due to type 2 diabetes mellitus (Multi)      Hematology   (+) Anemia   (-) History of blood transfusion (Pt denies)      Musculoskeletal  Hx impingement syndrome B shoulders;  Hx B knee pain;  Hx L TKR and R THR   (+) Chronic TMJ pain   (+) Chronic low back pain (Hx sciatica (L>R))   (+) Chronic neck pain (Hx C6 cervical radiculopathy s/p ACDF surgery)   (+) Chronic pain disorder (Hx lumbar post-laminectomy syndrome)   (+) Chronic pain syndrome (Hx myofascial pain syndrome)   (+) Generalized osteoarthritis of multiple sites   (+) Lumbar degenerative disc disease (Chronic lumbar radiculitis s/p prior back surgery)   (+) Lumbar spondylosis   (+) Lumbar stenosis (With degenerative spondylolisthesis)   (+) Lumbosacral spondylosis without myelopathy   (+) Primary localized osteoarthritis of right hip   (+) Primary localized osteoarthrosis, lower leg   (+) Primary osteoarthritis of right knee (And Left knee, s/p L TKR)      HEENT   (+) Acute recurrent pansinusitis (Hx Left serous otitis media)   (+) Seasonal allergies   (+) Vision loss (Hx vitreous floaters of L eye)      ID  Hx herpes-related angina;  Hx oral thrush   (+) Chronic hepatitis C without hepatic coma (Multi)   (+) H. pylori infection   (+) Herpangina   (+) Oral thrush   (+) Tinea cruris   (+) UTI (urinary tract infection)      Skin (within normal limits)      GYN  Hx atrophic vaginitis   (+) Abdominal pregnancy without intrauterine pregnancy (HHS-HCC) (S/p ex-lap/excision of ectopic pregnancy)   (+) History of hysterectomy   (-) Breast cancer (Multi) (Pt states she had L breast biopsy with marker in place, but turned out not to be cancer)       Clinical information reviewed:                   NPO Detail:  No data recorded     Physical Exam    Airway  Mallampati: II  TM distance: >3 FB  Neck ROM: limited     Cardiovascular - normal exam     Dental   (+) implants     Pulmonary - normal exam      Abdominal - normal exam  Abdomen: soft  Bowel sounds: normal     Other findings: Many implants scattered throughout mouth (je. Lower jaw), all dentition solid/none loose per patient.    Mildly decreased neck extension noted.          Anesthesia Plan    History of general anesthesia?: yes  History of complications of general anesthesia?: no    ASA 3     general   (Plan GETA/A-line/PIVx2)  The patient is not a current smoker.  Patient was previously instructed to abstain from smoking on day of procedure.  Patient did not smoke on day of procedure.  Education provided regarding risk of obstructive sleep apnea.  intravenous induction   Postoperative administration of opioids is intended.  Trial extubation is planned.  Anesthetic plan and risks discussed with patient.  Use of blood products discussed with patient who consented to blood products.    Plan discussed with CRNA and attending.

## 2024-11-05 NOTE — PERIOPERATIVE NURSING NOTE
Dr. Cruz at the bedside to check patient's pain level. Patient stated pain feeling better and sleepy.

## 2024-11-05 NOTE — ANESTHESIA PROCEDURE NOTES
Airway  Date/Time: 11/5/2024 12:11 PM  Urgency: elective    Airway not difficult    Staffing  Performed: SHABBIR   Authorized by: Shyam Clemens MD    Performed by: Pura Barahona  Patient location during procedure: OR    Indications and Patient Condition  Indications for airway management: anesthesia and airway protection  Spontaneous Ventilation: absent  Sedation level: deep  Preoxygenated: yes  Patient position: sniffing  MILS maintained throughout  Mask difficulty assessment: 1 - vent by mask  Planned trial extubation    Final Airway Details  Final airway type: endotracheal airway      Successful airway: ETT  Cuffed: yes   Successful intubation technique: direct laryngoscopy  Facilitating devices/methods: intubating stylet and anterior pressure/BURP  Endotracheal tube insertion site: oral  Blade: [place]  Blade size: #3  ETT size (mm): 7.0  Cormack-Lehane Classification: grade IIa - partial view of glottis  Placement verified by: chest auscultation and capnometry   Measured from: lips  ETT to lips (cm): 21  Number of attempts at approach: 1  Ventilation between attempts: none  Number of other approaches attempted: 0

## 2024-11-05 NOTE — OP NOTE
L4-S1 Extension Revision Fusion, L4-L5 Transforaminal Lumbar Interbody Fusion Operative Note     Date: 2024  OR Location: [facility] OR    Name: Gabby Madrid, : 1950, Age: 74 y.o., MRN: [id number], Sex: female    Diagnosis  Pre-op Diagnosis      * Lumbar radiculopathy [M54.16] Post-op Diagnosis     * Lumbar radiculopathy [M54.16]     Procedures  L4-S1 Extension Revision Fusion, L4-L5 Transforaminal Lumbar Interbody Fusion   - WI ARTHRODESIS COMBINED TQ 1NTRSPC LUMBAR    WI POSTERIOR SEGMENTAL INSTRUMENTATION 3-6 VRT SEG []  WI ALLOGRAFT FOR SPINE SURGERY ONLY MORSELIZED []  WI AUTOGRAFT SPINE SURGERY LOCAL FROM SAME INCISION []  WI INSJ BIOMCHN DEV INTERVERTEBRAL DSC SPC W/ARTHRD [02350]  WI ARTHRODESIS PST/PSTLAT TQ 1NTRSPC EA ADDL NTRSPC [82840]  WI STEREOTACTIC COMPUTER ASSISTED PX SPINAL [34129]  WI JORGENSEN FACETEC/FORAMOT DRG ARTHRD LUMBAR 1 VRT SGM [02581]    L4-S1 pedicle screw fixation with L4-5, L5-S1 posterolateral fixation  L4-5 bilateral facetectomies with L4-5 transforaminal lumbar interbody fusion  L5-S1 left facetectomy  Use of intraoperative neuronavigation  Use of fluoroscopy       Surgeons      * Jacoby Casillas - Primary    Resident/Fellow/Other Assistant:  Surgeons and Role:     * Lencho Jamison MD - Resident - Assisting     * Kelsey Villalba MD - Resident - Assisting    Staff:   Circulator: Shira Brisenoub Person: [place]  Scrub Person: Bell Anderson Circulator: Sandy Anderson Scrub: Rosalinda    Anesthesia Staff: Anesthesiologist: Kurtis Cruz MD; Shyam Clemens MD  CRNA: VINNY Caraballo-CRNA  C-AA: CORETTA Dowell  SRNA: Pura Barahona    Procedure Summary  Anesthesia: General  ASA: III  Estimated Blood Loss: 200mL  Intra-op Medications:   Administrations occurring from 1050 to 1725 on 24:   Medication Name Total Dose   lidocaine-epinephrine (Xylocaine W/EPI) 0.5 %-1:200,000 injection 10 mL   thrombin-recombinant (Recothrom) 5,000 unit topical  solution 5,000 Units   gelatin absorbable (Gelfoam) 100 sponge 1 each   sodium chloride 0.9 % irrigation solution 2,000 mL   ceFAZolin (Ancef) vial 1 g 2 g   dexAMETHasone (Decadron) injection 4 mg/mL 8 mg   ePHEDrine injection 10 mg   fentaNYL (Sublimaze) injection 50 mcg/mL 100 mcg   glycopyrrolate (Robinul) injection 0.2 mg   lactated Ringer's infusion Cannot be calculated   lidocaine (cardiac) injection 2% prefilled syringe 100 mg   methadone (Dolophine) injection 14 mg   midazolam PF (Versed) injection 1 mg/mL 1 mg   ondansetron (Zofran) 2 mg/mL injection 4 mg   phenylephrine 40 mcg/mL syringe 10 mL 480 mcg   propofol (Diprivan) injection 10 mg/mL 200 mg   rocuronium (ZeMuron) 50 mg/5 mL injection 90 mg   famotidine PF (Pepcid) injection 20 mg 20 mg              Anesthesia Record               Intraprocedure I/O Totals          Output    Urine 360 mL    Total Output 360 mL          Specimen: No specimens collected              Drains and/or Catheters:   Closed/Suction Drain Inferior Back Accordion 10 Fr. (Active)       Urethral Catheter Non-latex 16 Fr. (Active)       Tourniquet Times:         Implants:  Implants       Type Name Action Serial No.      Other MOD EX-PL [id number] mm 17 degrees Implanted [id number]     Graft COMPONENT, SIGNIFY, BIOACTIVE CRUNCH, 10CC - [id number] Implanted      Screw SCREW, RELINE-O, 7.5X40MM 2S POLYAXIAL - [id number] Implanted      Screw SCREW, RELINE-O, 7.5X45MM 2S POLYAXIAL - [id number] Implanted      Neuro Interventional Implant SCREW, RELINE LOCK, 5.5MM OPEN TULIP - [id number] Implanted      Screw BI, RELINE-O, 5.5 X 50MM, LORDOTIC - [id number] Implanted               Findings: good placement of hardware    Indications: Gabby Madrid is an 74 y.o. female who is having surgery for Lumbar radiculopathy [M54.16]. The patient had a prior L5-S1 PLIF and posterolateral fusion remotely and presented with back pain and lumbar radiculopathy (left > right) refractory to non-surgical  measures.    The patient was seen in the preoperative area. The risks, benefits, complications, treatment options, non-operative alternatives, expected recovery and outcomes were discussed with the patient. The possibilities of reaction to medication, pulmonary aspiration, injury to surrounding structures, bleeding, recurrent infection, the need for additional procedures, failure to diagnose a condition, and creating a complication requiring transfusion or operation were discussed with the patient. The patient concurred with the proposed plan, giving informed consent.  The site of surgery was properly noted/marked if necessary per policy. The patient has been actively warmed in preoperative area. Preoperative antibiotics have been ordered and given within 1 hours of incision. Venous thrombosis prophylaxis have been ordered including bilateral sequential compression devices    Procedure Details:     The patient was brought back from PACU to operative suite by anesthesia. After patient was appropriately checked in by nursing staff, anesthesia was induced and general endotracheal intubation was performed. After obtaining appropriate access by anesthesia, patient was flipped into prone position on Reg table with hip pads. Previous lateral incisions were marked and a midline incion was planned for the L4 to S1 levels.    Patient was prepped and draped in usual sterile fashion. Incision was remarked and 1% lidocaine with epinephrine was used to infiltrate skin under marked incision.    A combination of sharp and blunt electrocautery was used to expose spinous process, and lamina out to facet joint capsule above the prior construct with care taken to not violate this capsule with the bovey at the rostral-most planned instrumented level. The prior construct was also exposed using bovey electrocautery. The entirety of the patient's prior construct was removed (specifically, the L5 and S1 pedicle screws). Once adequate  exposure was obtained, a spinous process clamp with stealth star was placed on the spinous process above the top instrumented level, and O-arm (Symetis) was brought in for an intra-operative CT.    After O-arm spin was completed and good registration was confirmed, screws were placed in usual fashion, a navigated drill was used to ricardo out the entry site for the screws at each level. Then first using a tap under navigated guidance to create a screw trajectory in the pedicle, followed by confirmation that there was no breach in the pedicle using feeler ball, and finally, placing pedicle screws from at all under navigated guidance. 7.5-mm diameter pedicle screws (NUVASIVE) were used at all planned levels, which was from L4 to S1.    After satisfactory placement of screws, attention was then turned toward the bilateral L4-5 facetectomies and left L5-S1 facetectomy. A central decompression was performed from L4 to S1 first. Osteotomies were made across the L4 pars bilaterally to remove the inferior articulating processes of L4.  The superior articulating process of L5 was drilled down on the patient's right side to expose the L4-5 disc space. We also removed the left L5-S1 facet in the same fashion.    Attention was then turned toward transforaminal lumbar interbody fusion placement.     Discectomy was carried out in standard fashion making an annulotomy in the disc with a #11 blade, and sequentially removing disc material off of the endplates at both levels using sequentially increasing width endplate flip. Endplates were further prepared with curettes; once good disc preparation was completed, the disc space was packed with autograft and a lordotic titanium expandable interbody graft (NUVASIVE TLX) was placed and sequentially expanded under fluoroscopy.    After the interbody was placed, precut 5.5 x 50mm (NUVASIVE) rods were contoured appropriately. Rods were placed into tulip heads, and set caps were used at  all levels, and final fluroscopy shots were taken prior to final tightening to confirm adequate placement of all hardware    Native bone was decorticated with particular care taken at the facet joints and transverse processes, and autograft and allograft (NUVASIVE SIGNIFY) were used for fusion material to attain segmental arthrodesis across all described levels.     Incision was copiously irrigated with Irricept, followed by antibiotic-impregnated saline. One 10-round drains were placed in the subfascial space and tunneled out laterally to the incision. 0-vicryl suture was used to close the muscle over the laminectomy defect, followed by another layer of 0-vicryl suture was used to close the entirety of the linear fascial defect. 2-0 vicryls were used to approximate the skin edges and 3-0 stratafix was used to close skin and a prineo mesh-based skin glue dressing was placed over the incision. The drain was secured with a 2-0 silk suture.     Patient was carefully flipped into supine position on patient cart, and turned over to anesthesia to extubate. All counts were correct at end of case without any obvious complication.      Complications:  None; patient tolerated the procedure well.    Disposition: PACU - hemodynamically stable.  Condition: stable                 Additional Details: none    Attending Attestation:         Jacoby Casillas MD, [facility]  Spine , [facility]  Grey Marsh and Katelyn Marsh Chair in Spinal Neurosurgery  Neurosurgery , [facility] and [facility]  Complex Spine Surgery Fellowship Director   of Neurological Surgery  [employer]  Office: [phone]  Fax: [phone]

## 2024-11-05 NOTE — HOSPITAL COURSE
Gabby Mardid is a 74 yeart old female with PMH of HTN, HLD, DMII, HCV and prior L5-S1 TLIF ([place]) who presented to clinic with BLE (L>R) radiculopathy and back pain. Patient taken to the OR on 11/5 for L4-S1 revision fusion, L4-5 TLIF and admitted to the neurosurgery service post operatively.     11/6 upright XR with hardware intact  11/9 Drain removed     PT/OT eval and recommend low level therapy at discharge for which referral placed for home health care.   On the day of discharge, the patient was seen and evaluated by the neurosurgery team and deemed suitable for discharge.  The patient was given detailed discharge instructions and were scheduled to follow up as an outpatient.

## 2024-11-05 NOTE — ANESTHESIA PROCEDURE NOTES
Arterial Line:    Date/Time: 11/5/2024 12:18 PM    Staffing  Performed: [facility]   Authorized by: Shyam Clemens MD    Performed by: Pura Barahona    An arterial line was placed. Procedure performed using surface landmarks.in the OR for the following indication(s): continuous blood pressure monitoring.    A 20 gauge (size), 1 and 3/4 inch (length), Angiocath (type) catheter was placed into the Left radial artery, secured by Tegaderm,   Seldinger technique used.  Events:  patient tolerated procedure well with no complications.      Additional notes:  Arterial line insertion easy/atraumatic, no complications noted.

## 2024-11-05 NOTE — ANESTHESIA PROCEDURE NOTES
Peripheral IV  Date/Time: 11/5/2024 12:15 PM  Inserted by: Shyam Clemens MD    Placement  Needle size: 16 G  Laterality: right  Location: wrist  Local anesthetic: none  Site prep: chlorhexidine  Technique: anatomical landmarks  Attempts: 1

## 2024-11-06 ENCOUNTER — APPOINTMENT (OUTPATIENT)
Dept: RADIOLOGY | Facility: HOSPITAL | Age: 74
DRG: 428 | End: 2024-11-06
Payer: MEDICARE

## 2024-11-06 ENCOUNTER — APPOINTMENT (OUTPATIENT)
Dept: UROLOGY | Facility: CLINIC | Age: 74
End: 2024-11-06
Payer: MEDICARE

## 2024-11-06 LAB
ANION GAP SERPL CALC-SCNC: 12 MMOL/L (ref 10–20)
BUN SERPL-MCNC: 14 MG/DL (ref 6–23)
CALCIUM SERPL-MCNC: 7.6 MG/DL (ref 8.6–10.6)
CHLORIDE SERPL-SCNC: 103 MMOL/L (ref 98–107)
CO2 SERPL-SCNC: 28 MMOL/L (ref 21–32)
CREAT SERPL-MCNC: 0.69 MG/DL (ref 0.5–1.05)
EGFRCR SERPLBLD CKD-EPI 2021: >90 ML/MIN/1.73M*2
ERYTHROCYTE [DISTWIDTH] IN BLOOD BY AUTOMATED COUNT: 13.6 % (ref 11.5–14.5)
GLUCOSE BLD MANUAL STRIP-MCNC: 123 MG/DL (ref 74–99)
GLUCOSE BLD MANUAL STRIP-MCNC: 154 MG/DL (ref 74–99)
GLUCOSE BLD MANUAL STRIP-MCNC: 168 MG/DL (ref 74–99)
GLUCOSE SERPL-MCNC: 141 MG/DL (ref 74–99)
HCT VFR BLD AUTO: 32.1 % (ref 36–46)
HGB BLD-MCNC: 10.2 G/DL (ref 12–16)
MCH RBC QN AUTO: 30 PG (ref 26–34)
MCHC RBC AUTO-ENTMCNC: 31.8 G/DL (ref 32–36)
MCV RBC AUTO: 94 FL (ref 80–100)
NRBC BLD-RTO: 0 /100 WBCS (ref 0–0)
PLATELET # BLD AUTO: 197 X10*3/UL (ref 150–450)
POTASSIUM SERPL-SCNC: 3.9 MMOL/L (ref 3.5–5.3)
RBC # BLD AUTO: 3.4 X10*6/UL (ref 4–5.2)
SODIUM SERPL-SCNC: 139 MMOL/L (ref 136–145)
WBC # BLD AUTO: 12.2 X10*3/UL (ref 4.4–11.3)

## 2024-11-06 PROCEDURE — 97530 THERAPEUTIC ACTIVITIES: CPT | Mod: GP

## 2024-11-06 PROCEDURE — 72100 X-RAY EXAM L-S SPINE 2/3 VWS: CPT

## 2024-11-06 PROCEDURE — 0SG0071 FUSION OF LUMBAR VERTEBRAL JOINT WITH AUTOLOGOUS TISSUE SUBSTITUTE, POSTERIOR APPROACH, POSTERIOR COLUMN, OPEN APPROACH: ICD-10-PCS | Performed by: STUDENT IN AN ORGANIZED HEALTH CARE EDUCATION/TRAINING PROGRAM

## 2024-11-06 PROCEDURE — 0SG3071 FUSION OF LUMBOSACRAL JOINT WITH AUTOLOGOUS TISSUE SUBSTITUTE, POSTERIOR APPROACH, POSTERIOR COLUMN, OPEN APPROACH: ICD-10-PCS | Performed by: STUDENT IN AN ORGANIZED HEALTH CARE EDUCATION/TRAINING PROGRAM

## 2024-11-06 PROCEDURE — 82947 ASSAY GLUCOSE BLOOD QUANT: CPT

## 2024-11-06 PROCEDURE — 97165 OT EVAL LOW COMPLEX 30 MIN: CPT | Mod: GO

## 2024-11-06 PROCEDURE — 01NR0ZZ RELEASE SACRAL NERVE, OPEN APPROACH: ICD-10-PCS | Performed by: STUDENT IN AN ORGANIZED HEALTH CARE EDUCATION/TRAINING PROGRAM

## 2024-11-06 PROCEDURE — 82374 ASSAY BLOOD CARBON DIOXIDE: CPT | Performed by: STUDENT IN AN ORGANIZED HEALTH CARE EDUCATION/TRAINING PROGRAM

## 2024-11-06 PROCEDURE — 01NB0ZZ RELEASE LUMBAR NERVE, OPEN APPROACH: ICD-10-PCS | Performed by: STUDENT IN AN ORGANIZED HEALTH CARE EDUCATION/TRAINING PROGRAM

## 2024-11-06 PROCEDURE — 85027 COMPLETE CBC AUTOMATED: CPT | Performed by: STUDENT IN AN ORGANIZED HEALTH CARE EDUCATION/TRAINING PROGRAM

## 2024-11-06 PROCEDURE — 72100 X-RAY EXAM L-S SPINE 2/3 VWS: CPT | Performed by: RADIOLOGY

## 2024-11-06 PROCEDURE — 2500000001 HC RX 250 WO HCPCS SELF ADMINISTERED DRUGS (ALT 637 FOR MEDICARE OP): Performed by: STUDENT IN AN ORGANIZED HEALTH CARE EDUCATION/TRAINING PROGRAM

## 2024-11-06 PROCEDURE — 97161 PT EVAL LOW COMPLEX 20 MIN: CPT | Mod: GP

## 2024-11-06 PROCEDURE — 8E0WXBF COMPUTER ASSISTED PROCEDURE OF TRUNK REGION, WITH FLUOROSCOPY: ICD-10-PCS | Performed by: STUDENT IN AN ORGANIZED HEALTH CARE EDUCATION/TRAINING PROGRAM

## 2024-11-06 PROCEDURE — 2500000004 HC RX 250 GENERAL PHARMACY W/ HCPCS (ALT 636 FOR OP/ED): Performed by: ANESTHESIOLOGY

## 2024-11-06 PROCEDURE — 0QB00ZZ EXCISION OF LUMBAR VERTEBRA, OPEN APPROACH: ICD-10-PCS | Performed by: STUDENT IN AN ORGANIZED HEALTH CARE EDUCATION/TRAINING PROGRAM

## 2024-11-06 PROCEDURE — 97530 THERAPEUTIC ACTIVITIES: CPT | Mod: GO

## 2024-11-06 PROCEDURE — 2500000004 HC RX 250 GENERAL PHARMACY W/ HCPCS (ALT 636 FOR OP/ED): Performed by: STUDENT IN AN ORGANIZED HEALTH CARE EDUCATION/TRAINING PROGRAM

## 2024-11-06 PROCEDURE — 0SG00AJ FUSION OF LUMBAR VERTEBRAL JOINT WITH INTERBODY FUSION DEVICE, POSTERIOR APPROACH, ANTERIOR COLUMN, OPEN APPROACH: ICD-10-PCS | Performed by: STUDENT IN AN ORGANIZED HEALTH CARE EDUCATION/TRAINING PROGRAM

## 2024-11-06 PROCEDURE — 1100000001 HC PRIVATE ROOM DAILY

## 2024-11-06 PROCEDURE — 2500000002 HC RX 250 W HCPCS SELF ADMINISTERED DRUGS (ALT 637 FOR MEDICARE OP, ALT 636 FOR OP/ED): Performed by: STUDENT IN AN ORGANIZED HEALTH CARE EDUCATION/TRAINING PROGRAM

## 2024-11-06 PROCEDURE — 2500000005 HC RX 250 GENERAL PHARMACY W/O HCPCS: Performed by: STUDENT IN AN ORGANIZED HEALTH CARE EDUCATION/TRAINING PROGRAM

## 2024-11-06 PROCEDURE — 97116 GAIT TRAINING THERAPY: CPT | Mod: GP

## 2024-11-06 PROCEDURE — 2500000005 HC RX 250 GENERAL PHARMACY W/O HCPCS

## 2024-11-06 PROCEDURE — 36415 COLL VENOUS BLD VENIPUNCTURE: CPT | Performed by: STUDENT IN AN ORGANIZED HEALTH CARE EDUCATION/TRAINING PROGRAM

## 2024-11-06 PROCEDURE — 97535 SELF CARE MNGMENT TRAINING: CPT | Mod: GO

## 2024-11-06 RX ORDER — ASPIRIN 81 MG/1
81 TABLET ORAL DAILY
Status: DISPENSED | OUTPATIENT
Start: 2024-11-06

## 2024-11-06 RX ORDER — AMOXICILLIN 250 MG
2 CAPSULE ORAL 2 TIMES DAILY
Status: DISPENSED | OUTPATIENT
Start: 2024-11-06

## 2024-11-06 RX ORDER — NEOMYCIN SULFATE, POLYMYXIN B SULFATE AND DEXAMETHASONE 3.5; 10000; 1 MG/ML; [USP'U]/ML; MG/ML
2 SUSPENSION/ DROPS OPHTHALMIC EVERY 8 HOURS SCHEDULED
Status: DISCONTINUED | OUTPATIENT
Start: 2024-11-06 | End: 2024-11-06

## 2024-11-06 RX ORDER — HYDROCHLOROTHIAZIDE 25 MG/1
50 TABLET ORAL DAILY
Status: DISPENSED | OUTPATIENT
Start: 2024-11-06

## 2024-11-06 RX ORDER — SODIUM CHLORIDE 9 MG/ML
100 INJECTION, SOLUTION INTRAVENOUS CONTINUOUS
Status: DISCONTINUED | OUTPATIENT
Start: 2024-11-06 | End: 2024-11-07

## 2024-11-06 RX ORDER — POLYETHYLENE GLYCOL 3350 17 G/17G
17 POWDER, FOR SOLUTION ORAL 2 TIMES DAILY
Status: DISCONTINUED | OUTPATIENT
Start: 2024-11-06 | End: 2024-11-07

## 2024-11-06 RX ORDER — ONDANSETRON HYDROCHLORIDE 2 MG/ML
4 INJECTION, SOLUTION INTRAVENOUS EVERY 8 HOURS PRN
Status: ACTIVE | OUTPATIENT
Start: 2024-11-06

## 2024-11-06 RX ORDER — INSULIN LISPRO 100 [IU]/ML
0-5 INJECTION, SOLUTION INTRAVENOUS; SUBCUTANEOUS
Status: DISPENSED | OUTPATIENT
Start: 2024-11-06

## 2024-11-06 RX ORDER — LIDOCAINE 560 MG/1
2 PATCH PERCUTANEOUS; TOPICAL; TRANSDERMAL DAILY
Status: DISPENSED | OUTPATIENT
Start: 2024-11-06

## 2024-11-06 RX ORDER — AMLODIPINE BESYLATE 5 MG/1
5 TABLET ORAL DAILY
Status: DISPENSED | OUTPATIENT
Start: 2024-11-06

## 2024-11-06 RX ORDER — OXYCODONE HYDROCHLORIDE 5 MG/1
10 TABLET ORAL EVERY 4 HOURS PRN
Status: DISPENSED | OUTPATIENT
Start: 2024-11-06

## 2024-11-06 RX ORDER — DOCOSANOL 100 MG/G
1 CREAM TOPICAL
Status: DISPENSED | OUTPATIENT
Start: 2024-11-06

## 2024-11-06 RX ORDER — HYDROMORPHONE HYDROCHLORIDE 1 MG/ML
0.5 INJECTION, SOLUTION INTRAMUSCULAR; INTRAVENOUS; SUBCUTANEOUS EVERY 4 HOURS PRN
Status: DISCONTINUED | OUTPATIENT
Start: 2024-11-06 | End: 2024-11-07

## 2024-11-06 RX ORDER — HYDROCORTISONE 25 MG/G
CREAM TOPICAL 2 TIMES DAILY
Status: DISCONTINUED | OUTPATIENT
Start: 2024-11-06 | End: 2024-11-07

## 2024-11-06 RX ORDER — DEXTROSE 50 % IN WATER (D50W) INTRAVENOUS SYRINGE
12.5
Status: ACTIVE | OUTPATIENT
Start: 2024-11-06

## 2024-11-06 RX ORDER — DEXTROSE 50 % IN WATER (D50W) INTRAVENOUS SYRINGE
25
Status: ACTIVE | OUTPATIENT
Start: 2024-11-06

## 2024-11-06 RX ORDER — TRAZODONE HYDROCHLORIDE 50 MG/1
50 TABLET ORAL NIGHTLY PRN
Status: DISPENSED | OUTPATIENT
Start: 2024-11-06

## 2024-11-06 RX ORDER — POTASSIUM CHLORIDE 20 MEQ/1
40 TABLET, EXTENDED RELEASE ORAL DAILY
Status: DISCONTINUED | OUTPATIENT
Start: 2024-11-06 | End: 2024-11-06

## 2024-11-06 RX ORDER — DULOXETIN HYDROCHLORIDE 60 MG/1
60 CAPSULE, DELAYED RELEASE ORAL DAILY
Status: DISPENSED | OUTPATIENT
Start: 2024-11-06

## 2024-11-06 RX ORDER — NALOXONE HYDROCHLORIDE 0.4 MG/ML
0.2 INJECTION, SOLUTION INTRAMUSCULAR; INTRAVENOUS; SUBCUTANEOUS EVERY 5 MIN PRN
Status: ACTIVE | OUTPATIENT
Start: 2024-11-06

## 2024-11-06 RX ORDER — ASCORBIC ACID 500 MG
1000 TABLET ORAL DAILY
Status: DISPENSED | OUTPATIENT
Start: 2024-11-06

## 2024-11-06 RX ORDER — HEPARIN SODIUM 5000 [USP'U]/ML
5000 INJECTION, SOLUTION INTRAVENOUS; SUBCUTANEOUS EVERY 8 HOURS SCHEDULED
Status: DISPENSED | OUTPATIENT
Start: 2024-11-06

## 2024-11-06 RX ORDER — ATORVASTATIN CALCIUM 10 MG/1
10 TABLET, FILM COATED ORAL DAILY
Status: DISPENSED | OUTPATIENT
Start: 2024-11-06

## 2024-11-06 RX ORDER — ONDANSETRON 4 MG/1
4 TABLET, FILM COATED ORAL EVERY 8 HOURS PRN
Status: ACTIVE | OUTPATIENT
Start: 2024-11-06

## 2024-11-06 RX ORDER — NYSTATIN 100000 [USP'U]/G
1 POWDER TOPICAL 2 TIMES DAILY
Status: DISPENSED | OUTPATIENT
Start: 2024-11-06

## 2024-11-06 SDOH — HEALTH STABILITY: MENTAL HEALTH
DO YOU FEEL STRESS - TENSE, RESTLESS, NERVOUS, OR ANXIOUS, OR UNABLE TO SLEEP AT NIGHT BECAUSE YOUR MIND IS TROUBLED ALL THE TIME - THESE DAYS?: NOT AT ALL

## 2024-11-06 SDOH — ECONOMIC STABILITY: HOUSING INSECURITY: AT ANY TIME IN THE PAST 12 MONTHS, WERE YOU HOMELESS OR LIVING IN A SHELTER (INCLUDING NOW)?: NO

## 2024-11-06 SDOH — SOCIAL STABILITY: SOCIAL NETWORK
DO YOU BELONG TO ANY CLUBS OR ORGANIZATIONS SUCH AS CHURCH GROUPS, UNIONS, FRATERNAL OR ATHLETIC GROUPS, OR SCHOOL GROUPS?: NO

## 2024-11-06 SDOH — SOCIAL STABILITY: SOCIAL INSECURITY: DO YOU FEEL ANYONE HAS EXPLOITED OR TAKEN ADVANTAGE OF YOU FINANCIALLY OR OF YOUR PERSONAL PROPERTY?: NO

## 2024-11-06 SDOH — SOCIAL STABILITY: SOCIAL NETWORK: HOW OFTEN DO YOU ATTEND CHURCH OR RELIGIOUS SERVICES?: MORE THAN 4 TIMES PER YEAR

## 2024-11-06 SDOH — SOCIAL STABILITY: SOCIAL INSECURITY: ABUSE: ADULT

## 2024-11-06 SDOH — SOCIAL STABILITY: SOCIAL INSECURITY: DOES ANYONE TRY TO KEEP YOU FROM HAVING/CONTACTING OTHER FRIENDS OR DOING THINGS OUTSIDE YOUR HOME?: NO

## 2024-11-06 SDOH — SOCIAL STABILITY: SOCIAL INSECURITY
WITHIN THE LAST YEAR, HAVE YOU BEEN KICKED, HIT, SLAPPED, OR OTHERWISE PHYSICALLY HURT BY YOUR PARTNER OR EX-PARTNER?: NO

## 2024-11-06 SDOH — SOCIAL STABILITY: SOCIAL INSECURITY: WITHIN THE LAST YEAR, HAVE YOU BEEN AFRAID OF YOUR PARTNER OR EX-PARTNER?: NO

## 2024-11-06 SDOH — HEALTH STABILITY: PHYSICAL HEALTH
HOW OFTEN DO YOU NEED TO HAVE SOMEONE HELP YOU WHEN YOU READ INSTRUCTIONS, PAMPHLETS, OR OTHER WRITTEN MATERIAL FROM YOUR DOCTOR OR PHARMACY?: NEVER

## 2024-11-06 SDOH — SOCIAL STABILITY: SOCIAL INSECURITY: HAS ANYONE EVER THREATENED TO HURT YOUR FAMILY OR YOUR PETS?: NO

## 2024-11-06 SDOH — ECONOMIC STABILITY: FOOD INSECURITY: WITHIN THE PAST 12 MONTHS, YOU WORRIED THAT YOUR FOOD WOULD RUN OUT BEFORE YOU GOT THE MONEY TO BUY MORE.: NEVER TRUE

## 2024-11-06 SDOH — SOCIAL STABILITY: SOCIAL INSECURITY: WERE YOU ABLE TO COMPLETE ALL THE BEHAVIORAL HEALTH SCREENINGS?: YES

## 2024-11-06 SDOH — SOCIAL STABILITY: SOCIAL NETWORK
IN A TYPICAL WEEK, HOW MANY TIMES DO YOU TALK ON THE PHONE WITH FAMILY, FRIENDS, OR NEIGHBORS?: MORE THAN THREE TIMES A WEEK

## 2024-11-06 SDOH — SOCIAL STABILITY: SOCIAL INSECURITY: WITHIN THE LAST YEAR, HAVE YOU BEEN HUMILIATED OR EMOTIONALLY ABUSED IN OTHER WAYS BY YOUR PARTNER OR EX-PARTNER?: NO

## 2024-11-06 SDOH — ECONOMIC STABILITY: FOOD INSECURITY: WITHIN THE PAST 12 MONTHS, THE FOOD YOU BOUGHT JUST DIDN'T LAST AND YOU DIDN'T HAVE MONEY TO GET MORE.: NEVER TRUE

## 2024-11-06 SDOH — SOCIAL STABILITY: SOCIAL INSECURITY: HAVE YOU HAD ANY THOUGHTS OF HARMING ANYONE ELSE?: NO

## 2024-11-06 SDOH — ECONOMIC STABILITY: HOUSING INSECURITY: IN THE PAST 12 MONTHS, HOW MANY TIMES HAVE YOU MOVED WHERE YOU WERE LIVING?: 0

## 2024-11-06 SDOH — HEALTH STABILITY: PHYSICAL HEALTH: ON AVERAGE, HOW MANY DAYS PER WEEK DO YOU ENGAGE IN MODERATE TO STRENUOUS EXERCISE (LIKE A BRISK WALK)?: 3 DAYS

## 2024-11-06 SDOH — SOCIAL STABILITY: SOCIAL INSECURITY: DO YOU FEEL UNSAFE GOING BACK TO THE PLACE WHERE YOU ARE LIVING?: NO

## 2024-11-06 SDOH — SOCIAL STABILITY: SOCIAL INSECURITY: ARE YOU MARRIED, WIDOWED, DIVORCED, SEPARATED, NEVER MARRIED, OR LIVING WITH A PARTNER?: MARRIED

## 2024-11-06 SDOH — SOCIAL STABILITY: SOCIAL INSECURITY
WITHIN THE LAST YEAR, HAVE YOU BEEN RAPED OR FORCED TO HAVE ANY KIND OF SEXUAL ACTIVITY BY YOUR PARTNER OR EX-PARTNER?: NO

## 2024-11-06 SDOH — ECONOMIC STABILITY: HOUSING INSECURITY: IN THE LAST 12 MONTHS, WAS THERE A TIME WHEN YOU WERE NOT ABLE TO PAY THE MORTGAGE OR RENT ON TIME?: NO

## 2024-11-06 SDOH — SOCIAL STABILITY: SOCIAL INSECURITY: ARE THERE ANY APPARENT SIGNS OF INJURIES/BEHAVIORS THAT COULD BE RELATED TO ABUSE/NEGLECT?: NO

## 2024-11-06 SDOH — HEALTH STABILITY: PHYSICAL HEALTH: ON AVERAGE, HOW MANY MINUTES DO YOU ENGAGE IN EXERCISE AT THIS LEVEL?: 10 MIN

## 2024-11-06 SDOH — ECONOMIC STABILITY: TRANSPORTATION INSECURITY: IN THE PAST 12 MONTHS, HAS LACK OF TRANSPORTATION KEPT YOU FROM MEDICAL APPOINTMENTS OR FROM GETTING MEDICATIONS?: NO

## 2024-11-06 SDOH — SOCIAL STABILITY: SOCIAL INSECURITY: HAVE YOU HAD THOUGHTS OF HARMING ANYONE ELSE?: NO

## 2024-11-06 SDOH — SOCIAL STABILITY: SOCIAL INSECURITY: ARE YOU OR HAVE YOU BEEN THREATENED OR ABUSED PHYSICALLY, EMOTIONALLY, OR SEXUALLY BY ANYONE?: NO

## 2024-11-06 SDOH — ECONOMIC STABILITY: INCOME INSECURITY: IN THE PAST 12 MONTHS HAS THE ELECTRIC, GAS, OIL, OR WATER COMPANY THREATENED TO SHUT OFF SERVICES IN YOUR HOME?: NO

## 2024-11-06 SDOH — ECONOMIC STABILITY: FOOD INSECURITY: HOW HARD IS IT FOR YOU TO PAY FOR THE VERY BASICS LIKE FOOD, HOUSING, MEDICAL CARE, AND HEATING?: NOT HARD AT ALL

## 2024-11-06 SDOH — SOCIAL STABILITY: SOCIAL NETWORK: HOW OFTEN DO YOU ATTEND MEETINGS OF THE CLUBS OR ORGANIZATIONS YOU BELONG TO?: NEVER

## 2024-11-06 SDOH — SOCIAL STABILITY: SOCIAL NETWORK: HOW OFTEN DO YOU GET TOGETHER WITH FRIENDS OR RELATIVES?: THREE TIMES A WEEK

## 2024-11-06 ASSESSMENT — ACTIVITIES OF DAILY LIVING (ADL)
EFFECT OF PAIN ON DAILY ACTIVITIES: DECREASED MOBILITY
GROOMING: INDEPENDENT
HEARING - LEFT EAR: FUNCTIONAL
ADL_ASSISTANCE: INDEPENDENT
LACK_OF_TRANSPORTATION: NO
BATHING: INDEPENDENT
ADEQUATE_TO_COMPLETE_ADL: YES
BATHING_ASSISTANCE: MODERATE
PATIENT'S MEMORY ADEQUATE TO SAFELY COMPLETE DAILY ACTIVITIES?: YES
DRESSING YOURSELF: INDEPENDENT
FEEDING YOURSELF: INDEPENDENT
LACK_OF_TRANSPORTATION: NO
ADL_ASSISTANCE: INDEPENDENT
WALKS IN HOME: INDEPENDENT
HOME_MANAGEMENT_TIME_ENTRY: 16
JUDGMENT_ADEQUATE_SAFELY_COMPLETE_DAILY_ACTIVITIES: YES
TOILETING: INDEPENDENT
HEARING - RIGHT EAR: FUNCTIONAL

## 2024-11-06 ASSESSMENT — LIFESTYLE VARIABLES
SUBSTANCE_ABUSE_PAST_12_MONTHS: NO
AUDIT-C TOTAL SCORE: 0
HOW MANY STANDARD DRINKS CONTAINING ALCOHOL DO YOU HAVE ON A TYPICAL DAY: PATIENT DOES NOT DRINK
SKIP TO QUESTIONS 9-10: 1
HOW OFTEN DO YOU HAVE 6 OR MORE DRINKS ON ONE OCCASION: NEVER
AUDIT-C TOTAL SCORE: 0
HOW OFTEN DO YOU HAVE A DRINK CONTAINING ALCOHOL: NEVER
PRESCIPTION_ABUSE_PAST_12_MONTHS: NO

## 2024-11-06 ASSESSMENT — COGNITIVE AND FUNCTIONAL STATUS - GENERAL
MOBILITY SCORE: 14
STANDING UP FROM CHAIR USING ARMS: A LITTLE
TOILETING: A LITTLE
HELP NEEDED FOR BATHING: A LOT
CLIMB 3 TO 5 STEPS WITH RAILING: A LOT
DAILY ACTIVITIY SCORE: 17
MOVING TO AND FROM BED TO CHAIR: A LOT
HELP NEEDED FOR BATHING: A LITTLE
TURNING FROM BACK TO SIDE WHILE IN FLAT BAD: A LITTLE
DRESSING REGULAR UPPER BODY CLOTHING: A LITTLE
PERSONAL GROOMING: A LITTLE
CLIMB 3 TO 5 STEPS WITH RAILING: A LITTLE
MOBILITY SCORE: 24
MOBILITY SCORE: 24
MOBILITY SCORE: 18
DRESSING REGULAR LOWER BODY CLOTHING: A LITTLE
STANDING UP FROM CHAIR USING ARMS: A LOT
MOVING FROM LYING ON BACK TO SITTING ON SIDE OF FLAT BED WITH BEDRAILS: A LITTLE
DRESSING REGULAR UPPER BODY CLOTHING: A LITTLE
PATIENT BASELINE BEDBOUND: NO
DAILY ACTIVITIY SCORE: 19
TOILETING: A LITTLE
MOVING TO AND FROM BED TO CHAIR: A LITTLE
WALKING IN HOSPITAL ROOM: A LOT
TURNING FROM BACK TO SIDE WHILE IN FLAT BAD: A LITTLE
PERSONAL GROOMING: A LITTLE
DAILY ACTIVITIY SCORE: 24
MOVING FROM LYING ON BACK TO SITTING ON SIDE OF FLAT BED WITH BEDRAILS: A LITTLE
DRESSING REGULAR LOWER BODY CLOTHING: A LOT
DAILY ACTIVITIY SCORE: 24
WALKING IN HOSPITAL ROOM: A LITTLE

## 2024-11-06 ASSESSMENT — PAIN SCALES - GENERAL
PAINLEVEL_OUTOF10: 4
PAINLEVEL_OUTOF10: 3
PAINLEVEL_OUTOF10: 4
PAINLEVEL_OUTOF10: 7
PAINLEVEL_OUTOF10: 8
PAINLEVEL_OUTOF10: 3
PAINLEVEL_OUTOF10: 7

## 2024-11-06 ASSESSMENT — PAIN DESCRIPTION - DESCRIPTORS
DESCRIPTORS: ACHING
DESCRIPTORS: SORE

## 2024-11-06 ASSESSMENT — PAIN - FUNCTIONAL ASSESSMENT
PAIN_FUNCTIONAL_ASSESSMENT: 0-10

## 2024-11-06 NOTE — ANESTHESIA POSTPROCEDURE EVALUATION
Patient: Gabby Madrid    Procedure Summary       Date: 11/05/24 Room / Location: [facility] OR 25 / Virtual [facility] [place]    Anesthesia Start: 1158 Anesthesia Stop: 1632    Procedure: L4-S1 Extension Revision Fusion, L4-L5 Transforaminal Lumbar Interbody Fusion (Back) Diagnosis:       Lumbar radiculopathy      (Lumbar radiculopathy [M54.16])    Surgeons: Jacoby Casillas MD Responsible Provider: Kurtis Cruz MD    Anesthesia Type: general ASA Status: 3            Anesthesia Type: general    Vitals Value Taken Time   BP 99/58 11/06/24 1823   Temp 36.3 °C (97.3 °F) 11/06/24 1624   Pulse 75 11/06/24 1624   Resp 18 11/06/24 1624   SpO2 93 % 11/06/24 1624       Anesthesia Post Evaluation    Patient location during evaluation: PACU  Patient participation: complete - patient participated  Level of consciousness: awake  Pain management: adequate  Airway patency: patent  Cardiovascular status: acceptable  Respiratory status: acceptable  Hydration status: acceptable  Postoperative Nausea and Vomiting: none        No notable events documented.

## 2024-11-06 NOTE — PROGRESS NOTES
Occupational Therapy    Evaluation and Treatment    Patient Name: Gabby Madrid  MRN: [id number]  Today's Date: 11/6/2024  Room: [address] [place], [place] [address]  Time Calculation  Start Time: 1103  Stop Time: 1149  Time Calculation (min): 46 min    Assessment  IP OT Assessment  OT Assessment: Pt's abilty to complete ADLs currently limited by spinal precautions, pain, and deficits in functional strength, activity tolerance, and dynamic balance. The pt demos the ability to complete most ADL tasks with Mod-SBA. Pt will benefit from continued OT while admitted to increase IND and safety prior to d/c.  Prognosis: Good  Barriers to Discharge: None  Evaluation/Treatment Tolerance: Patient tolerated treatment well  Medical Staff Made Aware: Yes  End of Session Communication: Bedside nurse  End of Session Patient Position: Bed, 3 rail up, Alarm off, not on at start of session  Plan:  Inpatient Plan  Treatment Interventions: ADL retraining, Endurance training, Functional transfer training, Equipment evaluation/education, Patient/family training, Compensatory technique education  OT Frequency: 2 times per week  OT Discharge Recommendations: Low intensity level of continued care  Equipment Recommended upon Discharge: Wheeled walker (Shower chair)  OT Recommended Transfer Status: Minimal assist, Assist of 1  OT - OK to Discharge: Yes  OT Assessment  OT Assessment Results: Decreased ADL status, Decreased endurance, Decreased functional mobility, Decreased IADLs  Prognosis: Good  Barriers to Discharge: None  Evaluation/Treatment Tolerance: Patient tolerated treatment well  Medical Staff Made Aware: Yes  Strengths: Attitude of self, Support of Caregivers, Premorbid level of function, Housing layout  Barriers to Participation: Comorbidities    Subjective   Current Problem:  1. Lumbar radiculopathy  Insert and maintain peripheral IV    Saline lock IV    Type And Screen    Pulse oximetry, spot    celecoxib (CeleBREX) capsule 400 mg    acetaminophen  (Tylenol) tablet 975 mg    tranexamic acid (Lysteda) tablet 1,300 mg    Full code    Place in outpatient/hospital ambulatory surgery    Insert and maintain peripheral IV    Saline lock IV    Pulse oximetry, spot    Full code    Place in outpatient/hospital ambulatory surgery    Type And Screen    Wheeled Folding Walker    Referral to Home Health    CANCELED: NPO Diet Except: Sips with meds; Effective now    CANCELED: Height and weight    CANCELED: POCT Glucose    CANCELED: Vital Signs    CANCELED: Apply sequential compression device    CANCELED: Apply ROSARIO hose    CANCELED: NPO Diet Except: Sips with meds; Effective now    CANCELED: Height and weight    CANCELED: POCT Glucose    CANCELED: Vital Signs    CANCELED: Apply sequential compression device    CANCELED: Apply ROSARIO hose      2. Impaired functional mobility and endurance  Wheeled Folding Walker    Referral to Home Health        General:  Reason for Referral: L4-S1 Extension Revision Fusion, L4-L5 Transforaminal Lumbar Interbody Fusion  Past Medical History Relevant to Rehab: HTN, HLD, T2DM, Hep C, DDD s/p back surgery, Postlaminectomy syndrome, OA, Nephrolithiasis, and Depression  Prior to Session Communication: Bedside nurse  Patient Position Received: Bed, 3 rail up, Alarm off, not on at start of session  Family/Caregiver Present: No  General Comment: Pt pleasant and agreeable to OT evaluation, reports she is in pain from x-ray, but agreeable to participate.   Precautions:  Medical Precautions: Fall precautions, Spinal precautions  Post-Surgical Precautions: Spinal precautions  Vital Signs:  Vital Signs (Past 2hrs)           Pain:  Pain Assessment  Pain Assessment: 0-10  0-10 (Numeric) Pain Score: 8  Pain Type: Acute pain, Surgical pain  Pain Location: Back  Pain Interventions: Repositioned  Response to Interventions: Best at rest  Lines/Tubes/Drains:  Closed/Suction Drain Inferior Back Accordion 10 Fr. (Active)   Number of days: 0         Objective    Cognition:  Overall Cognitive Status: Within Functional Limits  Orientation Level: Oriented X4           Home Living:  Type of Home: House  Lives With: Spouse  Home Adaptive Equipment: Walker rolling or standard, Cane, Quad cane, Crutches  Home Layout: Multi-level, Full bath main level, Stairs to alternate level with rails  Alternate Level Stairs-Rails: Left  Alternate Level Stairs-Number of Steps: 13  Home Access: Stairs to enter with rails  Entrance Stairs-Rails: Left  Entrance Stairs-Number of Steps: 2  Bathroom Shower/Tub: Tub/shower unit  Bathroom Toilet: Adaptive toilet seating  Bathroom Equipment: Grab bars in shower, Shower chair without back   Prior Function:  Level of Cyrus: Independent with ADLs and functional transfers, Independent with homemaking with ambulation  ADL Assistance: Independent  Homemaking Assistance: Independent  Ambulatory Assistance: Independent  Vocational: Retired  Leisure: Sewing, time with family, crafting  Hand Dominance: Right  Prior Function Comments: - falls  IADL History:  Homemaking Responsibilities: Yes  Meal Prep Responsibility: Primary  Laundry Responsibility: Primary  Cleaning Responsibility: Primary  Bill Paying/Finance Responsibility: Primary  Shopping Responsibility: Primary  Current License: Yes  Mode of Transportation: Car  Occupation: Retired  Leisure and Hobbies: Sewing, time with family, crafting  ADL:  Eating Assistance: Independent (Anticipated)  Grooming Assistance: Stand by  Grooming Deficit: Supervision/safety  Bathing Assistance: Moderate (Anticipated)  UE Dressing Assistance: Minimal  LE Dressing Assistance: Maximal  LE Dressing Deficit: Don/doff L sock, Don/doff R sock  Toileting Assistance with Device: Stand by  Toileting Deficit: Supervison/safety  Activity Tolerance:  Endurance: Tolerates 10 - 20 min exercise with multiple rests  Balance:  Dynamic Standing Balance  Dynamic Standing-Balance Support: Bilateral upper extremity supported  Dynamic  Standing-Level of Assistance: Contact guard  Dynamic Standing-Comments: FWW  Static Sitting Balance  Static Sitting-Balance Support: No upper extremity supported, Feet supported  Static Sitting-Level of Assistance: Independent  Static Standing Balance  Static Standing-Balance Support: Bilateral upper extremity supported  Static Standing-Level of Assistance: Distant supervision  Static Standing-Comment/Number of Minutes: FWW  Bed Mobility/Transfers: Bed Mobility/Transfers: Bed Mobility  Bed Mobility: Yes  Bed Mobility 1  Bed Mobility 1: Supine to sitting, Sitting to supine  Level of Assistance 1: Close supervision  Bed Mobility Comments 1: Reinforced log roll, cues   and Transfers  Transfer: Yes  Transfer 1  Transfer From 1: Sit to, Stand to  Transfer to 1: Stand, Sit  Technique 1: Sit to stand, Stand to sit  Transfer Device 1: Walker  Transfer Level of Assistance 1: Contact guard  Trials/Comments 1: Cues for positioning  IADL's:   Homemaking Responsibilities: Yes  Meal Prep Responsibility: Primary  Laundry Responsibility: Primary  Cleaning Responsibility: Primary  Bill Paying/Finance Responsibility: Primary  Shopping Responsibility: Primary  Current License: Yes  Mode of Transportation: Car  Occupation: Retired  Leisure and Hobbies: Sewing, time with family, crafting  Vision: Vision - Basic Assessment  Current Vision: Wears glasses only for reading   and    Sensation:  Light Touch: No apparent deficits  Strength:  Strength Comments: Not formally assessed, anticipate WFL  Perception:  Inattention/Neglect: Appears intact  Coordination:  Movements are Fluid and Coordinated: Yes   Hand Function:  Hand Function  Gross Grasp: Functional  Coordination: Functional  Extremities:   RUE   RUE : Within Functional Limits, LUE   LUE: Within Functional Limits,  , and        Outcome Measures: [facility] Daily Activity  Putting on and taking off regular lower body clothing: A lot  Bathing (including washing, rinsing, drying): A  lot  Putting on and taking off regular upper body clothing: A little  Toileting, which includes using toilet, bedpan or urinal: A little  Taking care of personal grooming such as brushing teeth: A little  Eating Meals: None  Daily Activity - Total Score: 17         ,     OT Adult Other Outcome Measures  4AT: 4 AT -    Education Documentation  Handouts, taught by Jt Mayers OT at 11/6/2024 12:12 PM.  Learner: Patient  Readiness: Acceptance  Method: Explanation, Demonstration, Handout  Response: Verbalizes Understanding    Body Mechanics, taught by Jt Mayers OT at 11/6/2024 12:12 PM.  Learner: Patient  Readiness: Acceptance  Method: Explanation, Demonstration, Handout  Response: Verbalizes Understanding    Precautions, taught by Jt Mayers OT at 11/6/2024 12:12 PM.  Learner: Patient  Readiness: Acceptance  Method: Explanation, Demonstration, Handout  Response: Verbalizes Understanding    ADL Training, taught by Jt Mayers OT at 11/6/2024 12:12 PM.  Learner: Patient  Readiness: Acceptance  Method: Explanation, Demonstration, Handout  Response: Verbalizes Understanding    Education Comments  No comments found.        Goals:   Encounter Problems       Encounter Problems (Active)       ADLs       Patient with complete lower body dressing with modified independent level of assistance donning and doffing all LE clothes  with PRN adaptive equipment while edge of bed  and standing (Progressing)       Start:  11/06/24    Expected End:  11/27/24            Patient will complete daily grooming tasks brushing teeth and washing face/hair with modified independent level of assistance and PRN adaptive equipment while  standing. (Progressing)       Start:  11/06/24    Expected End:  11/27/24            Patient will complete toileting including hygiene clothing management/hygiene with modified independent level of assistance and raised toilet seat and grab bars. (Progressing)       Start:  11/06/24    Expected End:   11/27/24               BALANCE       Pt will maintain dynamic standing balance during ADL task with modified independent level of assistance in order to demonstrate decreased risk of falling and improved postural control. (Progressing)       Start:  11/06/24    Expected End:  11/27/24               COGNITION/SAFETY       Patient will recall and adhere to spine precautions during all functional mobility/ADL tasks in order to demonstrate improved understanding and promote healing post op (Progressing)       Start:  11/06/24    Expected End:  11/27/24               MOBILITY       Patient will perform Functional mobility mod  Household distances/Community Distances with modified independent level of assistance and least restrictive device in order to improve safety and functional mobility. (Progressing)       Start:  11/06/24    Expected End:  11/27/24                   Treatment Completed on Evaluation  Activities of Daily Living: Toilet Transfers  Toilet Transfer From: Bed  Toilet Transfer Type: To and from  Toilet Transfer to: Standard toilet  Toilet Transfer Technique: Ambulating  Toilet Transfers: Contact guard  Toilet Transfers Comments: CGA with FWW, cues for grab bar use  Grooming  Grooming Level of Assistance: Distant supervision  Grooming Where Assessed: Edge of bed  Grooming Comments: Oral hygiene and face washing at EOB at tray table with SUP for safety and cues for posture and postioning      LE Dressing  LE Dressing Comments: Discussed use of AE for LE dressing including use of reacher and sock aid, pt reports good understanding, however states that  will assist as needed at home.  Toileting  Toileting Level of Assistance: Contact guard  Where Assessed: Toilet  Toileting Comments: CGA while standing at toilet to manage clothing, cues for positioing and use of grab bar. SUP during hygiene.       Therapy/Activity:     Therapeutic Activity  Therapeutic Activity Performed: Yes  Therapeutic Activity 1:  Therapist provided reinforced education on spine precaution handout. Therapist discussed precautions and modified ADL tasks to maintain precautions. Discussion on home setup and use of AE as needed. Pt reports good understanding.  Therapeutic Activity 2: Functional mobility within room using FWW. CGA for safety, cues for positioing, ambulates ~30 feet before sitting.  Therapeutic Activity 3: Education provided on benefit of upright sitting throughout the day - pt aggreable to sit in chair for lunch with assistance for transfers.     11/06/24 at 12:13 PM   Jt Mayers OT   Rehab Office: [phone]

## 2024-11-06 NOTE — CARE PLAN
Problem: Skin  Goal: Decreased wound size/increased tissue granulation at next dressing change  Outcome: Progressing  Goal: Participates in plan/prevention/treatment measures  Outcome: Progressing  Goal: Prevent/manage excess moisture  Outcome: Progressing  Goal: Prevent/minimize sheer/friction injuries  Outcome: Progressing  Flowsheets (Taken 11/6/2024 0442)  Prevent/minimize sheer/friction injuries: Use pull sheet  Goal: Promote/optimize nutrition  Outcome: Progressing  Goal: Promote skin healing  Outcome: Progressing     Problem: Pain  Goal: Takes deep breaths with improved pain control throughout the shift  Outcome: Progressing  Goal: Turns in bed with improved pain control throughout the shift  Outcome: Progressing  Goal: Walks with improved pain control throughout the shift  Outcome: Progressing  Goal: Performs ADL's with improved pain control throughout shift  Outcome: Progressing  Goal: Participates in PT with improved pain control throughout the shift  Outcome: Progressing  Goal: Free from opioid side effects throughout the shift  Outcome: Progressing  Goal: Free from acute confusion related to pain meds throughout the shift  Outcome: Progressing     Problem: Fall/Injury  Goal: Not fall by end of shift  Outcome: Progressing  Goal: Be free from injury by end of the shift  Outcome: Progressing  Goal: Verbalize understanding of personal risk factors for fall in the hospital  Outcome: Progressing  Goal: Verbalize understanding of risk factor reduction measures to prevent injury from fall in the home  Outcome: Progressing  Goal: Use assistive devices by end of the shift  Outcome: Progressing  Goal: Pace activities to prevent fatigue by end of the shift  Outcome: Progressing

## 2024-11-06 NOTE — PROGRESS NOTES
"Gabby Madrid is a 74 y.o. female on day 0 of admission presenting with Lumbar radiculopathy.    Subjective   Radiculopathy improving       Objective     Physical Exam  Aox3  BUE 5  LLE HF/KE/DF/PF 5  RLE HF5 w/ persistence, KE/DF/PF 5  SILT  Incision cdi      Last Recorded Vitals  Blood pressure 113/75, pulse 77, temperature 37 °C (98.6 °F), temperature source Temporal, resp. rate 16, height 1.68 m (5' 6.14\"), weight (!) 162 kg (357 lb 2.3 oz), SpO2 96%.  Intake/Output last 3 Shifts:  I/O last 3 completed shifts:  In: 911.7 (5.6 mL/kg) [P.O.:540; I.V.:371.7 (2.3 mL/kg)]  Out: 1210 (7.5 mL/kg) [Urine:970 (0.2 mL/kg/hr); Drains:240]  Weight: 162 kg     Relevant Results                                Assessment/Plan   Assessment & Plan  Lumbar radiculopathy    Renal cancer (Multi)    Gastrointestinal hemorrhage with melena    Chronic low back pain    Chronic neck pain    Peripheral vascular disease (CMS-HCC)    Asthma    Seasonal allergies    Pseudotumor cerebri    Chronic TMJ pain    Vision loss    PUD (peptic ulcer disease)    History of hysterectomy    Abdominal pregnancy without intrauterine pregnancy (HHS-HCC)    H/o HTN, HLD, DMII, HCV, prior L5-S1 TLIF ([place]) p/w BLE (L>R) radiculopathy and back pain, 11/5 s/p L4-S1 revision fusion, L4-5 TLIF    PLAN    Floor, drain, uprights in AM (ordered), BG control, PTOT           Andreas Vidal MD      "

## 2024-11-06 NOTE — PROGRESS NOTES
Physical Therapy    Physical Therapy Evaluation & Treatment    Patient Name: Gabby Madrid  MRN: [id number]  Department:   Room: 6066/6066-A  Today's Date: 11/6/2024   Time Calculation  Start Time: 0834  Stop Time: 0937  Time Calculation (min): 63 min    Assessment/Plan   PT Assessment  PT Assessment Results: Decreased strength, Decreased range of motion, Decreased endurance, Impaired balance, Decreased mobility, Orthopedic restrictions, Pain  Rehab Prognosis: Good  Barriers to Discharge: none  Evaluation/Treatment Tolerance: Patient limited by fatigue  Medical Staff Made Aware: Yes  Strengths: Attitude of self, Coping skills  Barriers to Participation: Comorbidities  End of Session Communication: Bedside nurse  Assessment Comment: The pt presented with a slightly unsteady gait using the wheeled walker, but safe and appropriate for low intensity level therapy after d/c.  End of Session Patient Position: Up in chair, Alarm on   IP OR SWING BED PT PLAN  Inpatient or Swing Bed: Inpatient  PT Plan  Treatment/Interventions: Bed mobility, Transfer training, Gait training, Stair training, Balance training, Strengthening, Endurance training, Range of motion, Therapeutic exercise, Therapeutic activity, Home exercise program  PT Plan: Ongoing PT  PT Frequency: Daily  PT Discharge Recommendations: Low intensity level of continued care  Equipment Recommended upon Discharge: Wheeled walker  PT Recommended Transfer Status: Assist x1  PT - OK to Discharge: Yes      Subjective     General Visit Information:  General  Reason for Referral: Lumbar radiculopathy s/p L4-S1 pedicle screw fixation with L4-5, L5-S1 posterolateral fixation, L4-5 bilateral facetectomies with L4-5 transforaminal lumbar interbody fusion, and L5-S1 left facetectomy  Past Medical History Relevant to Rehab: HTN, HLD, T2DM, Hep C, DDD s/p back surgery, Postlaminectomy syndrome, OA, Nephrolithiasis, and Depression  Prior to Session Communication: Bedside  nurse  Patient Position Received: Bed, 3 rail up, Alarm on  Preferred Learning Style: verbal, visual, written  General Comment: The pt was pleasant, cooperative and willing to participate in therapy.  Home Living:  Home Living  Type of Home: House  Lives With: Spouse  Home Adaptive Equipment: Walker rolling or standard, Cane, Quad cane, Crutches  Home Layout: Multi-level, Full bath main level, Stairs to alternate level with rails  Alternate Level Stairs-Rails: Left  Alternate Level Stairs-Number of Steps: 13  Home Access: Stairs to enter with rails  Entrance Stairs-Rails: Left  Entrance Stairs-Number of Steps: 2  Bathroom Shower/Tub: Tub/shower unit  Bathroom Toilet: Adaptive toilet seating  Bathroom Equipment: Grab bars in shower, Shower chair without back  Home Living Comments: Pt will stay on first floor.  Prior Level of Function:  Prior Function Per Pt/Caregiver Report  Level of [place]: Independent with ADLs and functional transfers, Independent with homemaking with ambulation  Receives Help From: Family  ADL Assistance: Independent  Homemaking Assistance: Independent  Ambulatory Assistance: Independent  Vocational: Retired  Leisure: Sewing  Hand Dominance: Right  Prior Function Comments: The pt was independent with all mobility using a quad cane as needed in the household and in the community.  Precautions:  Precautions  Hearing/Visual Limitations: Hearing and vision WFL with reading glasses.  Medical Precautions: Fall precautions, Spinal precautions  Precautions Comment: Pt in compliance with precautions throughout PT session.    Vital Signs (Past 2hrs)        Date/Time Vitals Session Patient Position Pulse Resp SpO2 BP MAP (mmHg)    11/06/24 0834 During PT  Lying  72  --  98 %  --  --     11/06/24 0912 --  --  70  18  96 %  115/59  --                   Vital Signs Comment: vitals stable    Objective   Pain:  Pain Assessment  Pain Assessment: 0-10  0-10 (Numeric) Pain Score: 7  Pain Type: Acute pain,  Surgical pain  Pain Location: Back  Pain Orientation: Lower  Pain Radiating Towards: right hip  Pain Descriptors: Aching  Pain Frequency: Constant/continuous  Pain Onset: Ongoing  Clinical Progression: Not changed  Effect of Pain on Daily Activities: Decreased mobility  Patient's Stated Pain Goal: No pain  Pain Interventions: Ambulation/increased activity, Therapeutic presence  Response to Interventions: Pain stable  Cognition:  Cognition  Overall Cognitive Status: Within Functional Limits  Orientation Level: Oriented X4  Following Commands: Follows all commands and directions without difficulty    General Assessments:  General Observation  General Observation: Pt received education on bed mobility using the log roll technique and the step-to gait using the wheeled walker with gait training.     Activity Tolerance  Endurance: Decreased tolerance for upright activites    Sensation  Light Touch: No apparent deficits    Strength  Strength Comments: Decreased trunk strength  Perception  Inattention/Neglect: Appears intact    Coordination  Movements are Fluid and Coordinated: Yes  Coordination Comment: WFL    Postural Control  Postural Control: Within Functional Limits  Posture Comment: Pt presented with good sitting and standiing posture using a wheeled walker.    Static Sitting Balance  Static Sitting-Balance Support: Bilateral upper extremity supported, Feet supported  Static Sitting-Level of Assistance: Close supervision  Static Sitting-Comment/Number of Minutes: Sitting EOB  Dynamic Sitting Balance  Dynamic Sitting-Balance Support: Bilateral upper extremity supported, Feet supported  Dynamic Sitting-Level of Assistance: Close supervision  Dynamic Sitting-Comments: Sitting EOB    Static Standing Balance  Static Standing-Balance Support: Bilateral upper extremity supported  Static Standing-Level of Assistance: Close supervision  Static Standing-Comment/Number of Minutes: using a wheeled walker  Dynamic Standing  Balance  Dynamic Standing-Balance Support: Bilateral upper extremity supported  Dynamic Standing-Level of Assistance: Close supervision  Dynamic Standing-Comments: using a wheeled walker  Functional Assessments:  Bed Mobility  Bed Mobility: Yes  Bed Mobility 1  Bed Mobility 1: Supine to sitting  Level of Assistance 1: Minimum assistance  Bed Mobility Comments 1: log roll technique    Transfers  Transfer: Yes  Transfer 1  Transfer From 1: Sit to  Transfer to 1: Stand  Transfer Device 1: Walker  Transfer Level of Assistance 1: Close supervision  Transfers 2  Transfer From 2: Stand to  Transfer to 2: Sit  Transfer Device 2: Walker  Transfer Level of Assistance 2: Close supervision  Transfers 3  Transfer From 3: Bed to  Transfer to 3: Chair with arms  Transfer Device 3: Walker  Transfer Level of Assistance 3: Close supervision    Ambulation/Gait Training  Ambulation/Gait Training Performed: Yes  Ambulation/Gait Training 1  Surface 1: Level tile  Device 1: Rolling walker  Assistance 1: Close supervision  Quality of Gait 1: Decreased step length (slightly unsteady, decreased josé luis, step-to gait, decreased endurance)  Comments/Distance (ft) 1: 65ft    Stairs  Stairs: No  Extremity/Trunk Assessments:  Cervical Spine   Cervical Spine: Within Functional Limits  Lumbar Spine   Lumbar Spine : Exceptions to Funtional Limits  Lumbar Spine Comment: Decreased strength and ROM.    RUE   RUE : Within Functional Limits  LUE   LUE: Within Functional Limits  RLE   RLE : Within Functional Limits  LLE   LLE : Within Functional Limits  Treatments:     Bed Mobility  Bed Mobility: Yes  Bed Mobility 1  Bed Mobility 1: Supine to sitting  Level of Assistance 1: Minimum assistance  Bed Mobility Comments 1: log roll technique    Ambulation/Gait Training  Ambulation/Gait Training Performed: Yes  Ambulation/Gait Training 1  Surface 1: Level tile  Device 1: Rolling walker  Assistance 1: Close supervision  Quality of Gait 1: Decreased step length  (slightly unsteady, decreased josé luis, step-to gait, decreased endurance)  Comments/Distance (ft) 1: 65ft  Transfers  Transfer: Yes  Transfer 1  Transfer From 1: Sit to  Transfer to 1: Stand  Transfer Device 1: Walker  Transfer Level of Assistance 1: Close supervision  Transfers 2  Transfer From 2: Stand to  Transfer to 2: Sit  Transfer Device 2: Walker  Transfer Level of Assistance 2: Close supervision  Transfers 3  Transfer From 3: Bed to  Transfer to 3: Chair with arms  Transfer Device 3: Walker  Transfer Level of Assistance 3: Close supervision    Stairs  Stairs: No  Outcome Measures:  [facility] Basic Mobility  Turning from your back to your side while in a flat bed without using bedrails: A little  Moving from lying on your back to sitting on the side of a flat bed without using bedrails: A little  Moving to and from bed to chair (including a wheelchair): A little  Standing up from a chair using your arms (e.g. wheelchair or bedside chair): A little  To walk in hospital room: A little  Climbing 3-5 steps with railing: A little  Basic Mobility - Total Score: 18    Encounter Problems       Encounter Problems (Active)       Balance       STG - Maintains independent dynamic standing balance with upper extremity support using a wheeled walker. (Progressing)       Start:  11/06/24    Expected End:  11/20/24            STG - Maintains independent static standing balance with upper extremity support using a wheeled walker. (Progressing)       Start:  11/06/24    Expected End:  11/20/24               Mobility       STG - Patient will ambulate 125ft, independently using a wheeled walker. (Progressing)       Start:  11/06/24    Expected End:  11/20/24            STG - Patient will ascend and descend a flight of stairs, independently using one rail and a standard cane. (Progressing)       Start:  11/06/24    Expected End:  11/20/24               PT Transfers       STG - Transfer from bed to chair, independently using a wheeled  walker. (Progressing)       Start:  11/06/24    Expected End:  11/20/24            STG - Patient to transfer to and from sit to supine, independently. (Progressing)       Start:  11/06/24    Expected End:  11/20/24            STG - Patient will transfer sit to and from stand, independently using a wheeled walker. (Progressing)       Start:  11/06/24    Expected End:  11/20/24                   Education Documentation  Handouts, taught by Heath Zambrano PT at 11/6/2024 10:02 AM.  Learner: Patient  Readiness: Acceptance  Method: Explanation, Demonstration, Handout  Response: Verbalizes Understanding, Demonstrated Understanding  Comment: Pt received education on bed mobility using the log roll technique and the step-to gait using a wheeled walker with gait training.    Precautions, taught by Heath Zambrano PT at 11/6/2024 10:02 AM.  Learner: Patient  Readiness: Acceptance  Method: Explanation, Demonstration, Handout  Response: Verbalizes Understanding, Demonstrated Understanding  Comment: Pt received education on bed mobility using the log roll technique and the step-to gait using a wheeled walker with gait training.    Body Mechanics, taught by Heath Zambrano PT at 11/6/2024 10:02 AM.  Learner: Patient  Readiness: Acceptance  Method: Explanation, Demonstration, Handout  Response: Verbalizes Understanding, Demonstrated Understanding  Comment: Pt received education on bed mobility using the log roll technique and the step-to gait using a wheeled walker with gait training.    Home Exercise Program, taught by Heath Zambrano PT at 11/6/2024 10:02 AM.  Learner: Patient  Readiness: Acceptance  Method: Explanation, Demonstration, Handout  Response: Verbalizes Understanding, Demonstrated Understanding  Comment: Pt received education on bed mobility using the log roll technique and the step-to gait using a wheeled walker with gait training.    Mobility Training, taught by Heath Zambrano PT at 11/6/2024 10:02  JUAN.  Learner: Patient  Readiness: Acceptance  Method: Explanation, Demonstration, Handout  Response: Verbalizes Understanding, Demonstrated Understanding  Comment: Pt received education on bed mobility using the log roll technique and the step-to gait using a wheeled walker with gait training.    Education Comments  No comments found.

## 2024-11-06 NOTE — PROGRESS NOTES
11/06/24 1100   Discharge Planning   Living Arrangements Spouse/significant other   Support Systems Spouse/significant other   Assistance Needed cane   Type of Residence Private residence   Number of Stairs to Enter Residence 2   Number of Stairs Within Residence 0   Do you have animals or pets at home? No   Who is requesting discharge planning? Provider   Home or Post Acute Services None   Expected Discharge Disposition Home H   Does the patient need discharge transport arranged? No   Financial Resource Strain   How hard is it for you to pay for the very basics like food, housing, medical care, and heating? Not hard   Housing Stability   In the last 12 months, was there a time when you were not able to pay the mortgage or rent on time? N   In the past 12 months, how many times have you moved where you were living? 0   At any time in the past 12 months, were you homeless or living in a shelter (including now)? N   Transportation Needs   In the past 12 months, has lack of transportation kept you from medical appointments or from getting medications? no   In the past 12 months, has lack of transportation kept you from meetings, work, or from getting things needed for daily living? No     Assessment Note:  Met with patient and Introduced myself as care coordinator and member of the Care Transitions team for discharge planning. Pt feels safe at home.   Transportation: Patients spouse will come get her at time of discharge.  Pharmacy: [facility] on [address] In [place]  DME: Has cane at home and gave patient wheeled walker per doctors order and curly approval.  Previous home care: No  Falls: No  PCP: Darren Abdullahi, DO  Dialysis: No    Confirmed patients address, phone number and emergency contact. Answered all questions and concerns. Will continue to follow patient for any discharge needs.      Transitional Care Coordination Progress Note:  Patient discussed during interdisciplinary rounds.   Team  members present: MD and TCC  Plan per Medical/Surgical team: Patient pending drain removal.  Payor: Medicare  Discharge disposition: Home with [facility] for PT/OT. Patient in [facility] area.  Potential Barriers: None  ADOD: 11/07/24

## 2024-11-06 NOTE — PROGRESS NOTES
Pharmacy Medication History Review    Gabby Madrid is a 74 y.o. female admitted for Lumbar radiculopathy. Pharmacy reviewed the patient's uybot-ng-mjlccrhvl medications and allergies for accuracy.    Medications ADDED:  N/A  Medications CHANGED:  N/A  Medications REMOVED:   Coenzyme Q-10 100 mg cap  Vitamin B-12 500 mcg cap  Gabapentin 300 mg cap  Gavilyte-G 236-22.74-6.74-5.86 gram solution  Hydrocortisone 2.5% cream  Ibuprofen 200 mg tab  Trulance 3 mg tab  Kenalog 0.1% oral paste     The list below reflects the updated PTA list.   Prior to Admission Medications   Prescriptions Last Dose Informant   DULoxetine (Cymbalta) 60 mg DR capsule Past Week Self   Sig: TAKE 1 CAPSULE EVERY DAY   OneTouch Delica Plus Lancet 33 gauge misc  Self   Sig: TEST EVERY DAY   alpha tocopherol (Vitamin E) 670 mg (1,000 unit) capsule Past Week Self   Sig: Take 1 capsule (1,000 Units) by mouth once daily.   amLODIPine (Norvasc) 5 mg tablet Past Week Self   Sig: TAKE 1 TABLET EVERY DAY   ascorbic acid, vitamin C, 1,000 mg ER tablet Past Week Self   Sig: Take by mouth.   aspirin 81 mg EC tablet Past Week Self   Sig: Take 1 tablet (81 mg) by mouth once daily.   atorvastatin (Lipitor) 20 mg tablet Past Week Self   Sig: TAKE 1/2 TABLET EVERY DAY   blood sugar diagnostic (OneTouch Verio test strips) strip  Self   Si strip by percutaneous route once daily. Test blood sugar once daily. DX Code E11.9 Non Insulin Dependent   blood-glucose meter misc  Self   Sig: Check blood sugars daily for diagnosis 11.9 non-insulin-dependent   chlorhexidine (Hibiclens) 4 % external liquid     Sig: Apply 1 Application topically once daily for 5 days. Use per CPM/PAT provided instructions  Therapy complete   chlorhexidine (Peridex) 0.12 % solution  Self    Sig: Use 15 mL in the mouth or throat once daily for 2 doses.  Therapy complete   cholecalciferol (Vitamin D-3) 50 mcg (2,000 unit) capsule  Self   Sig: Take 1 capsule (50 mcg) by mouth early in the  morning..   cyanocobalamin (Vitamin B-12) 1,000 mcg/mL injection  Self   Sig: INJECT 1 ML AS DIRECTED EVERY 30 DAYS.   Patient taking differently: Inject 1 mL (1,000 mcg) into the muscle every 30 (thirty) days. INJECT 1 ML AS DIRECTED EVERY 30 DAYS.   cyanocobalamin, vitamin B-12, (B-12 Compliance) 1,000 mcg/mL kit  Self   Sig: Inject 1 mL as directed every 30 (thirty) days.   Patient not taking: Reported on 10/23/2024   hydroCHLOROthiazide (HYDRODiuril) 50 mg tablet Past Week Self   Sig: Take 1 tablet (50 mg) by mouth once daily.   multivitamin with minerals (multivit-min-iron fum-folic ac) tablet  Self   Sig: Take 1 tablet by mouth once daily.   neomycin-polymyxin-dexAMETHasone (Maxitrol) 3.5mg/mL-10,000 unit/mL-0.1 % ophthalmic suspension 11/4/2024 Self   Sig: INSTILL ONE DROP INTO RIGHT EYE THREE TIMES A DAY FOR 5 TO 7 DAYS   nystatin (Mycostatin) 100,000 unit/gram powder Not Taking Self   Sig: Apply 1 Application topically 2 times a day.   Patient not taking: Reported on 11/6/2024   oxyCODONE-acetaminophen (Percocet) 5-325 mg tablet Past Week Self   Sig: Take 1 tablet by mouth 3 times a day.   oxybutynin (Ditropan) 5 mg tablet  Self   Sig: Take 1 tablet (5 mg) by mouth 2 times a day.   polyethylene glycol (Glycolax) 17 gram/dose powder  Self   Sig: Mix 17 g of powder and drink once daily.   potassium chloride CR (K-Tab) 20 mEq ER tablet Past Week Self   Sig: Take 1 tablet (20 mEq) by mouth once daily.   pregabalin (Lyrica) 150 mg capsule 11/4/2024 Self   Sig: Take 1 capsule (150 mg) by mouth every 12 hours.   traZODone (Desyrel) 50 mg tablet 11/4/2024 Self   Sig: TAKE 1 TO 2 TABLETS AT BEDTIME AS NEEDED FOR SLEEP      Facility-Administered Medications: None         The list below reflects the updated allergy list. Please review each documented allergy for additional clarification and justification.  Allergies  Reviewed by Yohannes Lee on 11/6/2024        Severity Reactions Comments    Hydromorphone (bulk) Not  "Specified Itching     Iodinated Contrast Media Low Other, Rash Pt. states it makes her develop a rash and feel hot    Sulindac Low Unknown, Rash Increased blood count, liver malfunction            Patient accepts M2B at discharge.     Sources:   [facility]  Pharmacy dispense history  Patient interview Good historian     Additional Comments:  Patient stopped taking most medications prior to surgery and plans to resume once she is back home.      LILLIANA HINOJOSA  Pharmacy Technician  11/06/24     Secure Chat preferred   If no response call [phone] or [facility] \"Med Rec\"   "

## 2024-11-07 ENCOUNTER — APPOINTMENT (OUTPATIENT)
Dept: OPHTHALMOLOGY | Facility: CLINIC | Age: 74
End: 2024-11-07
Payer: MEDICARE

## 2024-11-07 ENCOUNTER — HOME HEALTH ADMISSION (OUTPATIENT)
Dept: HOME HEALTH SERVICES | Facility: HOME HEALTH | Age: 74
End: 2024-11-07
Payer: MEDICARE

## 2024-11-07 LAB
ANION GAP SERPL CALC-SCNC: 11 MMOL/L (ref 10–20)
BUN SERPL-MCNC: 18 MG/DL (ref 6–23)
CALCIUM SERPL-MCNC: 8.5 MG/DL (ref 8.6–10.6)
CHLORIDE SERPL-SCNC: 103 MMOL/L (ref 98–107)
CO2 SERPL-SCNC: 33 MMOL/L (ref 21–32)
CREAT SERPL-MCNC: 0.84 MG/DL (ref 0.5–1.05)
EGFRCR SERPLBLD CKD-EPI 2021: 73 ML/MIN/1.73M*2
ERYTHROCYTE [DISTWIDTH] IN BLOOD BY AUTOMATED COUNT: 13.7 % (ref 11.5–14.5)
GLUCOSE BLD MANUAL STRIP-MCNC: 113 MG/DL (ref 74–99)
GLUCOSE BLD MANUAL STRIP-MCNC: 120 MG/DL (ref 74–99)
GLUCOSE BLD MANUAL STRIP-MCNC: 131 MG/DL (ref 74–99)
GLUCOSE BLD MANUAL STRIP-MCNC: 159 MG/DL (ref 74–99)
GLUCOSE SERPL-MCNC: 118 MG/DL (ref 74–99)
HCT VFR BLD AUTO: 34.8 % (ref 36–46)
HGB BLD-MCNC: 10.5 G/DL (ref 12–16)
MCH RBC QN AUTO: 29.7 PG (ref 26–34)
MCHC RBC AUTO-ENTMCNC: 30.2 G/DL (ref 32–36)
MCV RBC AUTO: 98 FL (ref 80–100)
NRBC BLD-RTO: 0 /100 WBCS (ref 0–0)
PLATELET # BLD AUTO: 178 X10*3/UL (ref 150–450)
POTASSIUM SERPL-SCNC: 3.6 MMOL/L (ref 3.5–5.3)
RBC # BLD AUTO: 3.54 X10*6/UL (ref 4–5.2)
SODIUM SERPL-SCNC: 143 MMOL/L (ref 136–145)
WBC # BLD AUTO: 10.4 X10*3/UL (ref 4.4–11.3)

## 2024-11-07 PROCEDURE — 2500000001 HC RX 250 WO HCPCS SELF ADMINISTERED DRUGS (ALT 637 FOR MEDICARE OP)

## 2024-11-07 PROCEDURE — 97116 GAIT TRAINING THERAPY: CPT | Mod: GP,CQ

## 2024-11-07 PROCEDURE — 82947 ASSAY GLUCOSE BLOOD QUANT: CPT

## 2024-11-07 PROCEDURE — 1100000001 HC PRIVATE ROOM DAILY

## 2024-11-07 PROCEDURE — 2500000001 HC RX 250 WO HCPCS SELF ADMINISTERED DRUGS (ALT 637 FOR MEDICARE OP): Performed by: STUDENT IN AN ORGANIZED HEALTH CARE EDUCATION/TRAINING PROGRAM

## 2024-11-07 PROCEDURE — 85027 COMPLETE CBC AUTOMATED: CPT | Performed by: STUDENT IN AN ORGANIZED HEALTH CARE EDUCATION/TRAINING PROGRAM

## 2024-11-07 PROCEDURE — 36415 COLL VENOUS BLD VENIPUNCTURE: CPT | Performed by: STUDENT IN AN ORGANIZED HEALTH CARE EDUCATION/TRAINING PROGRAM

## 2024-11-07 PROCEDURE — 2500000002 HC RX 250 W HCPCS SELF ADMINISTERED DRUGS (ALT 637 FOR MEDICARE OP, ALT 636 FOR OP/ED): Performed by: STUDENT IN AN ORGANIZED HEALTH CARE EDUCATION/TRAINING PROGRAM

## 2024-11-07 PROCEDURE — 2500000004 HC RX 250 GENERAL PHARMACY W/ HCPCS (ALT 636 FOR OP/ED)

## 2024-11-07 PROCEDURE — 80048 BASIC METABOLIC PNL TOTAL CA: CPT | Performed by: STUDENT IN AN ORGANIZED HEALTH CARE EDUCATION/TRAINING PROGRAM

## 2024-11-07 PROCEDURE — 2500000004 HC RX 250 GENERAL PHARMACY W/ HCPCS (ALT 636 FOR OP/ED): Performed by: STUDENT IN AN ORGANIZED HEALTH CARE EDUCATION/TRAINING PROGRAM

## 2024-11-07 PROCEDURE — 2500000005 HC RX 250 GENERAL PHARMACY W/O HCPCS: Performed by: STUDENT IN AN ORGANIZED HEALTH CARE EDUCATION/TRAINING PROGRAM

## 2024-11-07 RX ORDER — POLYETHYLENE GLYCOL 3350 17 G/17G
17 POWDER, FOR SOLUTION ORAL 4 TIMES DAILY
Status: DISCONTINUED | OUTPATIENT
Start: 2024-11-07 | End: 2024-11-09

## 2024-11-07 RX ORDER — BISACODYL 5 MG
5 TABLET, DELAYED RELEASE (ENTERIC COATED) ORAL DAILY PRN
Status: DISPENSED | OUTPATIENT
Start: 2024-11-07

## 2024-11-07 RX ORDER — ASPIRIN 81 MG/1
81 TABLET ORAL DAILY
Status: CANCELLED
Start: 2024-11-07

## 2024-11-07 RX ORDER — BISACODYL 10 MG/1
10 SUPPOSITORY RECTAL DAILY
Status: DISPENSED | OUTPATIENT
Start: 2024-11-07

## 2024-11-07 RX ORDER — ADHESIVE BANDAGE
30 BANDAGE TOPICAL DAILY
Status: DISCONTINUED | OUTPATIENT
Start: 2024-11-07 | End: 2024-11-09

## 2024-11-07 ASSESSMENT — COGNITIVE AND FUNCTIONAL STATUS - GENERAL
MOBILITY SCORE: 24
TURNING FROM BACK TO SIDE WHILE IN FLAT BAD: A LITTLE
MOBILITY SCORE: 18
DAILY ACTIVITIY SCORE: 24
STANDING UP FROM CHAIR USING ARMS: A LITTLE
MOVING FROM LYING ON BACK TO SITTING ON SIDE OF FLAT BED WITH BEDRAILS: A LITTLE
CLIMB 3 TO 5 STEPS WITH RAILING: A LITTLE
MOVING TO AND FROM BED TO CHAIR: A LITTLE
WALKING IN HOSPITAL ROOM: A LITTLE

## 2024-11-07 ASSESSMENT — PAIN SCALES - GENERAL
PAINLEVEL_OUTOF10: 0 - NO PAIN
PAINLEVEL_OUTOF10: 7
PAINLEVEL_OUTOF10: 7
PAINLEVEL_OUTOF10: 5 - MODERATE PAIN
PAINLEVEL_OUTOF10: 3
PAINLEVEL_OUTOF10: 3

## 2024-11-07 ASSESSMENT — PAIN - FUNCTIONAL ASSESSMENT
PAIN_FUNCTIONAL_ASSESSMENT: 0-10

## 2024-11-07 NOTE — CARE PLAN
The patient's goals for the shift include      The clinical goals for the shift include pt will have pain managed and remain safe throughout my shift      Problem: Skin  Goal: Decreased wound size/increased tissue granulation at next dressing change  Outcome: Progressing  Goal: Participates in plan/prevention/treatment measures  Outcome: Progressing  Goal: Prevent/manage excess moisture  Outcome: Progressing  Goal: Prevent/minimize sheer/friction injuries  Outcome: Progressing  Goal: Promote/optimize nutrition  Outcome: Progressing  Goal: Promote skin healing  Outcome: Progressing     Problem: Pain  Goal: Takes deep breaths with improved pain control throughout the shift  Outcome: Progressing  Goal: Turns in bed with improved pain control throughout the shift  Outcome: Progressing  Goal: Walks with improved pain control throughout the shift  Outcome: Progressing  Goal: Performs ADL's with improved pain control throughout shift  Outcome: Progressing  Goal: Participates in PT with improved pain control throughout the shift  Outcome: Progressing  Goal: Free from opioid side effects throughout the shift  Outcome: Progressing  Goal: Free from acute confusion related to pain meds throughout the shift  Outcome: Progressing     Problem: Fall/Injury  Goal: Not fall by end of shift  Outcome: Progressing  Goal: Be free from injury by end of the shift  Outcome: Progressing  Goal: Verbalize understanding of personal risk factors for fall in the hospital  Outcome: Progressing  Goal: Verbalize understanding of risk factor reduction measures to prevent injury from fall in the home  Outcome: Progressing  Goal: Use assistive devices by end of the shift  Outcome: Progressing  Goal: Pace activities to prevent fatigue by end of the shift  Outcome: Progressing

## 2024-11-07 NOTE — PROGRESS NOTES
Physical Therapy Treatment    Patient Name: Gabby Madrid  MRN: [id number]  Today's Date: 11/7/2024  Room: [address] [place] [address]  Time Calculation  Start Time: 0857  Stop Time: 0920  Time Calculation (min): 23 min       Assessment/Plan   PT Assessment  PT Assessment Results: Decreased strength, Decreased range of motion, Decreased endurance, Impaired balance, Decreased mobility, Orthopedic restrictions, Pain  Rehab Prognosis: Good  Barriers to Discharge: none  Evaluation/Treatment Tolerance: Patient tolerated treatment well  Medical Staff Made Aware: Yes  Strengths: Attitude of self, Ability to acquire knowledge  Barriers to Participation: Comorbidities  End of Session Patient Position: Bed, 3 rail up, Alarm off, not on at start of session     PT Plan  Treatment/Interventions: Bed mobility, Transfer training, Gait training, Stair training, Balance training, Strengthening, Endurance training, Range of motion, Therapeutic exercise, Therapeutic activity, Home exercise program  PT Plan: Ongoing PT  PT Frequency: Daily  PT Discharge Recommendations: Low intensity level of continued care  Equipment Recommended upon Discharge: Wheeled walker  PT Recommended Transfer Status: Assist x1  PT - OK to Discharge: Yes  Assessment: Patient is progressing Well with therapy this date. Tolerated 3 steps well @ [facility]. Would continue to benefit from continued skilled PT to address all mobility deficits; Patient remains appropriate for LOW intensity therapy when medically appropriate for discharge from acute stay.  Will continue to follow.     General Visit Information:   PT  Visit  PT Received On: 11/07/24  Prior to Session Communication: Bedside nurse  Patient Position Received: Bed, 3 rail up     Subjective   Subjective: Pt pleasant and agreeable to therapy upon approach    Precautions:  Precautions  Medical Precautions: Fall precautions, Spinal precautions    Objective   Pain:  Pain Assessment  Pain Assessment: 0-10  0-10 (Numeric) Pain  Score: 7  Pain Type: Acute pain  Pain Location: Back  Pain Orientation: Lower  Pain Interventions: Repositioned, Medication (See MAR)  Cognition:  Cognition  Overall Cognitive Status: Within Functional Limits  Orientation Level: Oriented X4  Following Commands: Follows all commands and directions without difficulty    Lines/Tubes/Drains:  Closed/Suction Drain Inferior Back Accordion 10 Fr. (Active)   Number of days: 1     PT Treatments:     Therapeutic Activity  Therapeutic Activity 1: Able to adhere to spinal precautions w/o cues. Reminded pt to not don socks/shoes w/o help or with adaptive equipment     Bed Mobility 1  Bed Mobility 1: Supine to sitting, Sitting to supine  Level of Assistance 1: Modified independent  Bed Mobility Comments 1: use of bed rail, good log roll  Ambulation/Gait Training 1  Surface 1: Level tile  Device 1: Rolling walker  Assistance 1: Close supervision, Minimal verbal cues  Quality of Gait 1: Narrow base of support, Decreased step length (decr josé luis)  Comments/Distance (ft) 1: 75'x2  Transfer 1  Transfer From 1: Sit to  Transfer to 1: Stand  Transfer Device 1: Walker  Transfer Level of Assistance 1: Close supervision, Minimal verbal cues  Trials/Comments 1: vc for handplacement  Transfers 2  Transfer From 2: Stand to  Transfer to 2: Bed  Technique 2: Stand pivot  Transfer Device 2: Walker  Transfer Level of Assistance 2: Close supervision  Trials/Comments 2: x1  Stairs  Stairs: Yes  Stairs  Rails 1: Right  Device 1: Railing  Assistance 1: Close supervision, Minimal verbal cues  Comment/Number of Steps 1: 3 steps, step to w/o LOB          Activity tolerance:  Activity Tolerance  Endurance: Tolerates 10 - 20 min exercise with multiple rests    Outcome Measures:  [facility] Basic Mobility  Turning from your back to your side while in a flat bed without using bedrails: A little  Moving from lying on your back to sitting on the side of a flat bed without using bedrails: A little  Moving to and  from bed to chair (including a wheelchair): A little  Standing up from a chair using your arms (e.g. wheelchair or bedside chair): A little  To walk in hospital room: A little  Climbing 3-5 steps with railing: A little  Basic Mobility - Total Score: 18     Education Documentation  Handouts, taught by Elis Ball PTA at 11/7/2024  9:52 AM.  Learner: Patient  Readiness: Acceptance  Method: Explanation  Response: Verbalizes Understanding    Body Mechanics, taught by Elis Ball PTA at 11/7/2024  9:52 AM.  Learner: Patient  Readiness: Acceptance  Method: Explanation  Response: Verbalizes Understanding    Precautions, taught by Elis Ball PTA at 11/7/2024  9:52 AM.  Learner: Patient  Readiness: Acceptance  Method: Explanation  Response: Verbalizes Understanding    ADL Training, taught by Elis Ball PTA at 11/7/2024  9:52 AM.  Learner: Patient  Readiness: Acceptance  Method: Explanation  Response: Verbalizes Understanding    Handouts, taught by Elis Ball PTA at 11/7/2024  9:52 AM.  Learner: Patient  Readiness: Acceptance  Method: Explanation  Response: Verbalizes Understanding    Precautions, taught by Elis Ball PTA at 11/7/2024  9:52 AM.  Learner: Patient  Readiness: Acceptance  Method: Explanation  Response: Verbalizes Understanding    Body Mechanics, taught by Elis Ball PTA at 11/7/2024  9:52 AM.  Learner: Patient  Readiness: Acceptance  Method: Explanation  Response: Verbalizes Understanding    Home Exercise Program, taught by Elis Ball PTA at 11/7/2024  9:52 AM.  Learner: Patient  Readiness: Acceptance  Method: Explanation  Response: Verbalizes Understanding    Mobility Training, taught by Elis Ball PTA at 11/7/2024  9:52 AM.  Learner: Patient  Readiness: Acceptance  Method: Explanation  Response: Verbalizes Understanding    Education Comments  No comments found.          OP EDUCATION:       Encounter Problems       Encounter Problems (Active)       Balance       STG - Maintains independent  dynamic standing balance with upper extremity support using a wheeled walker. (Progressing)       Start:  11/06/24    Expected End:  11/20/24            STG - Maintains independent static standing balance with upper extremity support using a wheeled walker. (Progressing)       Start:  11/06/24    Expected End:  11/20/24               Mobility       STG - Patient will ambulate 125ft, independently using a wheeled walker. (Progressing)       Start:  11/06/24    Expected End:  11/20/24            STG - Patient will ascend and descend a flight of stairs, independently using one rail and a standard cane. (Progressing)       Start:  11/06/24    Expected End:  11/20/24               PT Transfers       STG - Transfer from bed to chair, independently using a wheeled walker. (Progressing)       Start:  11/06/24    Expected End:  11/20/24            STG - Patient to transfer to and from sit to supine, independently. (Progressing)       Start:  11/06/24    Expected End:  11/20/24            STG - Patient will transfer sit to and from stand, independently using a wheeled walker. (Progressing)       Start:  11/06/24    Expected End:  11/20/24

## 2024-11-08 ENCOUNTER — DOCUMENTATION (OUTPATIENT)
Dept: HOME HEALTH SERVICES | Facility: HOME HEALTH | Age: 74
End: 2024-11-08
Payer: MEDICARE

## 2024-11-08 LAB
ANION GAP SERPL CALC-SCNC: 11 MMOL/L (ref 10–20)
BUN SERPL-MCNC: 15 MG/DL (ref 6–23)
CALCIUM SERPL-MCNC: 8.1 MG/DL (ref 8.6–10.6)
CHLORIDE SERPL-SCNC: 102 MMOL/L (ref 98–107)
CO2 SERPL-SCNC: 34 MMOL/L (ref 21–32)
CREAT SERPL-MCNC: 0.74 MG/DL (ref 0.5–1.05)
EGFRCR SERPLBLD CKD-EPI 2021: 86 ML/MIN/1.73M*2
ERYTHROCYTE [DISTWIDTH] IN BLOOD BY AUTOMATED COUNT: 13.6 % (ref 11.5–14.5)
GLUCOSE BLD MANUAL STRIP-MCNC: 115 MG/DL (ref 74–99)
GLUCOSE BLD MANUAL STRIP-MCNC: 129 MG/DL (ref 74–99)
GLUCOSE BLD MANUAL STRIP-MCNC: 136 MG/DL (ref 74–99)
GLUCOSE BLD MANUAL STRIP-MCNC: 99 MG/DL (ref 74–99)
GLUCOSE SERPL-MCNC: 116 MG/DL (ref 74–99)
HCT VFR BLD AUTO: 31.1 % (ref 36–46)
HGB BLD-MCNC: 10 G/DL (ref 12–16)
MCH RBC QN AUTO: 30.8 PG (ref 26–34)
MCHC RBC AUTO-ENTMCNC: 32.2 G/DL (ref 32–36)
MCV RBC AUTO: 96 FL (ref 80–100)
NRBC BLD-RTO: 0 /100 WBCS (ref 0–0)
PLATELET # BLD AUTO: 173 X10*3/UL (ref 150–450)
POTASSIUM SERPL-SCNC: 3.2 MMOL/L (ref 3.5–5.3)
RBC # BLD AUTO: 3.25 X10*6/UL (ref 4–5.2)
SODIUM SERPL-SCNC: 144 MMOL/L (ref 136–145)
WBC # BLD AUTO: 8.5 X10*3/UL (ref 4.4–11.3)

## 2024-11-08 PROCEDURE — 82374 ASSAY BLOOD CARBON DIOXIDE: CPT | Performed by: STUDENT IN AN ORGANIZED HEALTH CARE EDUCATION/TRAINING PROGRAM

## 2024-11-08 PROCEDURE — 2500000002 HC RX 250 W HCPCS SELF ADMINISTERED DRUGS (ALT 637 FOR MEDICARE OP, ALT 636 FOR OP/ED): Performed by: STUDENT IN AN ORGANIZED HEALTH CARE EDUCATION/TRAINING PROGRAM

## 2024-11-08 PROCEDURE — 85027 COMPLETE CBC AUTOMATED: CPT | Performed by: STUDENT IN AN ORGANIZED HEALTH CARE EDUCATION/TRAINING PROGRAM

## 2024-11-08 PROCEDURE — 1100000001 HC PRIVATE ROOM DAILY

## 2024-11-08 PROCEDURE — 2500000001 HC RX 250 WO HCPCS SELF ADMINISTERED DRUGS (ALT 637 FOR MEDICARE OP): Performed by: STUDENT IN AN ORGANIZED HEALTH CARE EDUCATION/TRAINING PROGRAM

## 2024-11-08 PROCEDURE — 36415 COLL VENOUS BLD VENIPUNCTURE: CPT | Performed by: STUDENT IN AN ORGANIZED HEALTH CARE EDUCATION/TRAINING PROGRAM

## 2024-11-08 PROCEDURE — 2500000002 HC RX 250 W HCPCS SELF ADMINISTERED DRUGS (ALT 637 FOR MEDICARE OP, ALT 636 FOR OP/ED): Performed by: PHYSICIAN ASSISTANT

## 2024-11-08 PROCEDURE — 2500000001 HC RX 250 WO HCPCS SELF ADMINISTERED DRUGS (ALT 637 FOR MEDICARE OP)

## 2024-11-08 PROCEDURE — 82947 ASSAY GLUCOSE BLOOD QUANT: CPT

## 2024-11-08 PROCEDURE — 2500000004 HC RX 250 GENERAL PHARMACY W/ HCPCS (ALT 636 FOR OP/ED): Performed by: STUDENT IN AN ORGANIZED HEALTH CARE EDUCATION/TRAINING PROGRAM

## 2024-11-08 PROCEDURE — 2500000005 HC RX 250 GENERAL PHARMACY W/O HCPCS: Performed by: STUDENT IN AN ORGANIZED HEALTH CARE EDUCATION/TRAINING PROGRAM

## 2024-11-08 PROCEDURE — 2500000001 HC RX 250 WO HCPCS SELF ADMINISTERED DRUGS (ALT 637 FOR MEDICARE OP): Performed by: PHYSICIAN ASSISTANT

## 2024-11-08 PROCEDURE — 2500000004 HC RX 250 GENERAL PHARMACY W/ HCPCS (ALT 636 FOR OP/ED)

## 2024-11-08 PROCEDURE — RXMED WILLOW AMBULATORY MEDICATION CHARGE

## 2024-11-08 PROCEDURE — 97116 GAIT TRAINING THERAPY: CPT | Mod: GP,CQ

## 2024-11-08 RX ORDER — LIDOCAINE 560 MG/1
2 PATCH PERCUTANEOUS; TOPICAL; TRANSDERMAL DAILY
Qty: 14 PATCH | Refills: 0 | Status: SHIPPED | OUTPATIENT
Start: 2024-11-08

## 2024-11-08 RX ORDER — OXYCODONE HYDROCHLORIDE 5 MG/1
5 TABLET ORAL EVERY 6 HOURS PRN
Qty: 28 TABLET | Refills: 0 | Status: SHIPPED | OUTPATIENT
Start: 2024-11-08

## 2024-11-08 RX ORDER — DEXTROMETHORPHAN POLISTIREX 30 MG/5 ML
1 SUSPENSION, EXTENDED RELEASE 12 HR ORAL ONCE
Status: DISPENSED | OUTPATIENT
Start: 2024-11-08

## 2024-11-08 RX ORDER — AMOXICILLIN 250 MG
2 CAPSULE ORAL 2 TIMES DAILY
Qty: 14 TABLET | Refills: 0 | Status: SHIPPED | OUTPATIENT
Start: 2024-11-08

## 2024-11-08 RX ORDER — CYCLOBENZAPRINE HCL 10 MG
10 TABLET ORAL 3 TIMES DAILY PRN
Qty: 21 TABLET | Refills: 0 | Status: SHIPPED | OUTPATIENT
Start: 2024-11-08

## 2024-11-08 RX ORDER — ACETAMINOPHEN 325 MG/1
650 TABLET ORAL EVERY 6 HOURS PRN
Qty: 56 TABLET | Refills: 0 | Status: SHIPPED | OUTPATIENT
Start: 2024-11-08 | End: 2024-11-15

## 2024-11-08 RX ORDER — SYRING-NEEDL,DISP,INSUL,0.3 ML 29 G X1/2"
296 SYRINGE, EMPTY DISPOSABLE MISCELLANEOUS ONCE
Status: COMPLETED | OUTPATIENT
Start: 2024-11-08 | End: 2024-11-08

## 2024-11-08 RX ORDER — POTASSIUM CHLORIDE 20 MEQ/1
40 TABLET, EXTENDED RELEASE ORAL ONCE
Status: COMPLETED | OUTPATIENT
Start: 2024-11-08 | End: 2024-11-08

## 2024-11-08 ASSESSMENT — COGNITIVE AND FUNCTIONAL STATUS - GENERAL
STANDING UP FROM CHAIR USING ARMS: A LITTLE
CLIMB 3 TO 5 STEPS WITH RAILING: A LITTLE
WALKING IN HOSPITAL ROOM: A LITTLE
STANDING UP FROM CHAIR USING ARMS: A LITTLE
MOBILITY SCORE: 20
TURNING FROM BACK TO SIDE WHILE IN FLAT BAD: A LITTLE
DAILY ACTIVITIY SCORE: 24
MOVING TO AND FROM BED TO CHAIR: A LITTLE
MOVING TO AND FROM BED TO CHAIR: A LITTLE
MOBILITY SCORE: 21

## 2024-11-08 ASSESSMENT — PAIN - FUNCTIONAL ASSESSMENT
PAIN_FUNCTIONAL_ASSESSMENT: 0-10
PAIN_FUNCTIONAL_ASSESSMENT: 0-10

## 2024-11-08 ASSESSMENT — PAIN SCALES - GENERAL
PAINLEVEL_OUTOF10: 3
PAINLEVEL_OUTOF10: 7
PAINLEVEL_OUTOF10: 6
PAINLEVEL_OUTOF10: 4
PAINLEVEL_OUTOF10: 0 - NO PAIN

## 2024-11-08 ASSESSMENT — PAIN DESCRIPTION - DESCRIPTORS: DESCRIPTORS: SORE;ACHING

## 2024-11-08 NOTE — PROGRESS NOTES
"Gabby Madrid is a 74 y.o. female on day 2 of admission presenting with Lumbar radiculopathy.    Subjective   Radiculopathy improved       Objective     Physical Exam  Aox3  BUE 5  LLE HF/KE/DF/PF 5  RLE HF5 w/ persistence, KE/DF/PF 5  SILT  Incision cdi      Last Recorded Vitals  Blood pressure 118/63, pulse 80, temperature 36.7 °C (98.1 °F), temperature source Temporal, resp. rate 16, height 1.68 m (5' 6.14\"), weight (!) 162 kg (357 lb 2.3 oz), SpO2 93%.  Intake/Output last 3 Shifts:  I/O last 3 completed shifts:  In: - (0 mL/kg)   Out: 310 (1.9 mL/kg) [Urine:200 (0 mL/kg/hr); Drains:110]  Weight: 162 kg     Relevant Results                                Assessment/Plan   Assessment & Plan  Lumbar radiculopathy    Renal cancer (Multi)    Gastrointestinal hemorrhage with melena    Chronic low back pain    Chronic neck pain    Peripheral vascular disease (CMS-HCC)    Asthma    Seasonal allergies    Pseudotumor cerebri    Chronic TMJ pain    Vision loss    PUD (peptic ulcer disease)    History of hysterectomy    Abdominal pregnancy without intrauterine pregnancy (HHS-HCC)    H/o HTN, HLD, DMII, HCV, prior L5-S1 TLIF ([place]) p/w BLE (L>R) radiculopathy and back pain, 11/5 s/p L4-S1 revision fusion, L4-5 TLIF    11/6 uprights hardware in posn    PLAN    Floor, drain, needs BM before dispo, PTOT-home care           Andreas Vidal MD      "

## 2024-11-08 NOTE — PROGRESS NOTES
Physical Therapy Treatment    Patient Name: Gabby Madrid  MRN: [id number]  Today's Date: 11/8/2024  Room: [address] [place], [place] [address]  Time Calculation  Start Time: 1424  Stop Time: 1447  Time Calculation (min): 23 min       Assessment/Plan   PT Assessment  PT Assessment Results: Decreased strength, Decreased range of motion, Decreased endurance, Impaired balance, Decreased mobility, Orthopedic restrictions, Pain  Rehab Prognosis: Good  Barriers to Discharge: none  Evaluation/Treatment Tolerance:  (limited by upset stomach/bathroom needs)  Medical Staff Made Aware: Yes  Strengths: Attitude of self, Support of Caregivers  Barriers to Participation: Comorbidities  End of Session Communication: Bedside nurse  End of Session Patient Position: Bed, 3 rail up, Alarm off, not on at start of session     PT Plan  Treatment/Interventions: Bed mobility, Transfer training, Gait training, Stair training, Balance training, Strengthening, Endurance training, Range of motion, Therapeutic exercise, Therapeutic activity, Home exercise program  PT Plan: Ongoing PT  PT Frequency: Daily  PT Discharge Recommendations: Low intensity level of continued care  Equipment Recommended upon Discharge: Wheeled walker  PT Recommended Transfer Status: Assist x1  PT - OK to Discharge: Yes  Assessment: Patient is progressing Well with therapy this date. Pt limited by stomach discomfort during session, did have BM nursing notified. (DC dependent on BM). Would continue to benefit from continued skilled PT to address all mobility deficits; Patient remains appropriate for LOW intensity therapy when medically appropriate for discharge from acute stay.  Will continue to follow.     General Visit Information:   PT  Visit  PT Received On: 11/08/24  Prior to Session Communication: Bedside nurse  Patient Position Received: Bed, 3 rail up  Family/Caregiver Present: Yes  Caregiver Feedback:  present/supportive     Subjective   Subjective: Pt pleasant and agreeable to  therapy upon approach    Precautions:  Precautions  Medical Precautions: Fall precautions, Spinal precautions    Objective   Pain:  Pain Assessment  Pain Assessment: 0-10  0-10 (Numeric) Pain Score: 6  Pain Type: Acute pain  Pain Location: Back  Cognition:  Cognition  Overall Cognitive Status: Within Functional Limits  Orientation Level: Oriented X4  Following Commands: Follows all commands and directions without difficulty    Dynamic Sitting Balance:  Dynamic Sitting Balance  Dynamic Sitting-Balance Support: Feet supported  Dynamic Sitting-Level of Assistance: Independent  Dynamic Sitting-Comments: hygiene tasks  Dynamic Standing Balance:  Dynamic Standing Balance  Dynamic Standing-Balance Support: No upper extremity supported  Dynamic Standing-Level of Assistance: Close supervision  Dynamic Standing-Comments: unsupported during clothing management    Lines/Tubes/Drains:  Closed/Suction Drain Inferior Back Accordion 10 Fr. (Active)   Number of days: 2     PT Treatments:           Bed Mobility 1  Bed Mobility 1: Supine to sitting, Sitting to supine  Level of Assistance 1: Modified independent  Bed Mobility Comments 1: use of bed rail  Ambulation/Gait Training 1  Surface 1: Level tile  Device 1: Rolling walker  Assistance 1: Close supervision, Minimal verbal cues  Quality of Gait 1: Narrow base of support, Decreased step length (decreased josé luis)  Comments/Distance (ft) 1: 250', vc for obstacle negotiation, L path deviation  Transfer 1  Transfer From 1: Sit to  Transfer to 1: Stand  Transfer Device 1: Walker  Transfer Level of Assistance 1: Close supervision, Minimal verbal cues  Trials/Comments 1: x3 vc for handplacement  Transfers 2  Transfer From 2: Stand to  Transfer to 2: Bed, Toilet  Technique 2: Stand pivot  Transfer Device 2: Walker  Transfer Level of Assistance 2: Close supervision, Minimal verbal cues  Trials/Comments 2: x3     Activity tolerance:  Activity Tolerance  Endurance: Tolerates 10 - 20 min  exercise with multiple rests    Outcome Measures:  [facility] Basic Mobility  Turning from your back to your side while in a flat bed without using bedrails: None  Moving from lying on your back to sitting on the side of a flat bed without using bedrails: None  Moving to and from bed to chair (including a wheelchair): A little  Standing up from a chair using your arms (e.g. wheelchair or bedside chair): A little  To walk in hospital room: A little  Climbing 3-5 steps with railing: A little  Basic Mobility - Total Score: 20     Education Documentation  Handouts, taught by Elis Ball PTA at 11/8/2024  2:58 PM.  Learner: Patient  Readiness: Acceptance  Method: Explanation  Response: Verbalizes Understanding    Body Mechanics, taught by Elis Ball PTA at 11/8/2024  2:58 PM.  Learner: Patient  Readiness: Acceptance  Method: Explanation  Response: Verbalizes Understanding    Precautions, taught by Elis Ball PTA at 11/8/2024  2:58 PM.  Learner: Patient  Readiness: Acceptance  Method: Explanation  Response: Verbalizes Understanding    ADL Training, taught by Elis Ball PTA at 11/8/2024  2:58 PM.  Learner: Patient  Readiness: Acceptance  Method: Explanation  Response: Verbalizes Understanding    Handouts, taught by Elis Ball PTA at 11/8/2024  2:58 PM.  Learner: Patient  Readiness: Acceptance  Method: Explanation  Response: Verbalizes Understanding    Precautions, taught by Elis Ball PTA at 11/8/2024  2:58 PM.  Learner: Patient  Readiness: Acceptance  Method: Explanation  Response: Verbalizes Understanding    Body Mechanics, taught by Elis Ball PTA at 11/8/2024  2:58 PM.  Learner: Patient  Readiness: Acceptance  Method: Explanation  Response: Verbalizes Understanding    Home Exercise Program, taught by Elis Ball PTA at 11/8/2024  2:58 PM.  Learner: Patient  Readiness: Acceptance  Method: Explanation  Response: Verbalizes Understanding    Mobility Training, taught by Elis Ball PTA at 11/8/2024  2:58  PM.  Learner: Patient  Readiness: Acceptance  Method: Explanation  Response: Verbalizes Understanding    Education Comments  No comments found.          OP EDUCATION:       Encounter Problems       Encounter Problems (Active)       Balance       STG - Maintains independent dynamic standing balance with upper extremity support using a wheeled walker. (Progressing)       Start:  11/06/24    Expected End:  11/20/24            STG - Maintains independent static standing balance with upper extremity support using a wheeled walker. (Progressing)       Start:  11/06/24    Expected End:  11/20/24               Mobility       STG - Patient will ambulate 125ft, independently using a wheeled walker. (Progressing)       Start:  11/06/24    Expected End:  11/20/24            STG - Patient will ascend and descend a flight of stairs, independently using one rail and a standard cane. (Progressing)       Start:  11/06/24    Expected End:  11/20/24               PT Transfers       STG - Transfer from bed to chair, independently using a wheeled walker. (Progressing)       Start:  11/06/24    Expected End:  11/20/24            STG - Patient to transfer to and from sit to supine, independently. (Progressing)       Start:  11/06/24    Expected End:  11/20/24            STG - Patient will transfer sit to and from stand, independently using a wheeled walker. (Progressing)       Start:  11/06/24    Expected End:  11/20/24

## 2024-11-08 NOTE — HH CARE COORDINATION
Home Care received a Referral for Physical Therapy and Occupational Therapy. We have processed the referral for a Start of Care on 11/10/24.     If you have any questions or concerns, please feel free to contact us at [phone]. Follow the prompts, enter your five digit zip code, and you will be directed to your care team on [facility] 3.

## 2024-11-08 NOTE — PROGRESS NOTES
Gabby Madrid is a 74 y.o. female on day 2 of admission presenting with Lumbar radiculopathy.    Transitional Care Coordination Progress Note:  Patient discussed during interdisciplinary rounds.   Team members present: MD and TCC  Plan per Medical/Surgical team: Patient medically ready to go home with [facility] for PT/OT.  Payor: Medicare-signed IMM 11/07 at 12:30 Pm.  Discharge disposition: Home with [facility], SOC 24- 48 hours.  Potential Barriers: None  ADOD: 11/08/24      DEIRDRE PEREZ RN

## 2024-11-08 NOTE — DISCHARGE SUMMARY
Discharge Diagnosis  Lumbar radiculopathy    Test Results Pending At Discharge  Pending Labs       No current pending labs.            Hospital Course  Gabby Madrid is a 74 yeart old female with PMH of HTN, HLD, DMII, HCV and prior L5-S1 TLIF ([place]) who presented to clinic with BLE (L>R) radiculopathy and back pain. Patient taken to the OR on 11/5 for L4-S1 revision fusion, L4-5 TLIF and admitted to the neurosurgery service post operatively.     11/6 upright XR with hardware intact    PT/OT eval and recommend low level therapy at discharge for which referral placed for home health care.     On the day of discharge, the patient was seen and evaluated by the neurosurgery team and deemed suitable for discharge.  The patient was given detailed discharge instructions and were scheduled to follow up as an outpatient.    Pertinent Physical Exam At Time of Discharge  Physical Exam  Constitutional: A&Ox3, calm and cooperative, NAD.  Eyes: PERRL, clear sclera .  ENMT: Moist mucous membranes, no apparent injuries or lesions.  Head/Neck: Neck supple, no JVD. NC/AT.   Cardiovascular: Normal rate and regular rhythm. 2+ equal pulses of the distal extremities.  Respiratory/Thorax: CTAB, regular respirations on RA. Good symmetric chest expansion.   Gastrointestinal: Abdomen soft, non tender.   Extremities: BUE 5. LLE HF/KE/DF/PF 5. RLE HF5 w/ persistence, KE/DF/PF 5. SILT.  Neurological: A&Ox3.   Psychological: Appropriate mood and behavior.   Skin: Incision C/D/I.     Home Medications     Medication List      START taking these medications     acetaminophen 325 mg tablet; Commonly known as: Tylenol; Take 2 tablets   (650 mg) by mouth every 6 hours if needed for mild pain (1 - 3) for up to   7 days.   cyclobenzaprine 10 mg tablet; Commonly known as: Flexeril; Take 1 tablet   (10 mg) by mouth 3 times a day as needed for muscle spasms.   lidocaine 4 % patch; Place 2 patches over 12 hours on the skin once   daily. Remove & discard  patch within 12 hours or as directed by MD.   oxyCODONE 5 mg immediate release tablet; Commonly known as: Roxicodone;   Take 1 tablet (5 mg) by mouth every 6 hours if needed for severe pain (7 -   10).   sennosides-docusate sodium 8.6-50 mg tablet; Commonly known as:   Fay-Colace; Take 2 tablets by mouth 2 times a day. FOR CONSTIPATION WHILE   TAKING PAIN MEDICATION     CONTINUE taking these medications     alpha tocopherol 670 mg (1,000 unit) capsule; Commonly known as: Vitamin   E   amLODIPine 5 mg tablet; Commonly known as: Norvasc; TAKE 1 TABLET EVERY   DAY   ascorbic acid (vitamin C) 1,000 mg ER tablet   aspirin 81 mg EC tablet   atorvastatin 20 mg tablet; Commonly known as: Lipitor; TAKE 1/2 TABLET   EVERY DAY   blood-glucose meter misc; Check blood sugars daily for diagnosis 11.9   non-insulin-dependent   cholecalciferol 50 mcg (2,000 unit) capsule; Commonly known as: Vitamin   D-3   * cyanocobalamin 1,000 mcg/mL injection; Commonly known as: Vitamin   B-12; INJECT 1 ML AS DIRECTED EVERY 30 DAYS.   DULoxetine 60 mg DR capsule; Commonly known as: Cymbalta; TAKE 1 CAPSULE   EVERY DAY   hydroCHLOROthiazide 50 mg tablet; Commonly known as: HYDRODiuril   multivitamin with minerals tablet   neomycin-polymyxin-dexAMETHasone 3.5mg/mL-10,000 unit/mL-0.1 %   ophthalmic suspension; Commonly known as: Maxitrol   OneTouch Delica Plus Lancet 33 gauge misc; Generic drug: lancets   OneTouch Verio test strips strip; Generic drug: blood sugar diagnostic;   1 strip by percutaneous route once daily. Test blood sugar once daily. DX   Code E11.9 Non Insulin Dependent   oxybutynin 5 mg tablet; Commonly known as: Ditropan; Take 1 tablet (5   mg) by mouth 2 times a day.   polyethylene glycol 17 gram/dose powder; Commonly known as: Glycolax,   Miralax   potassium chloride CR 20 mEq ER tablet; Commonly known as: Klor-Con M20   pregabalin 150 mg capsule; Commonly known as: Lyrica   traZODone 50 mg tablet; Commonly known as: Desyrel;  TAKE 1 TO 2 TABLETS   AT BEDTIME AS NEEDED FOR SLEEP  * This list has 1 medication(s) that are the same as other medications   prescribed for you. Read the directions carefully, and ask your doctor or   other care provider to review them with you.     STOP taking these medications     chlorhexidine 0.12 % solution; Commonly known as: Peridex   chlorhexidine 4 % external liquid; Commonly known as: Hibiclens   nystatin 100,000 unit/gram powder; Commonly known as: Mycostatin   oxyCODONE-acetaminophen 5-325 mg tablet; Commonly known as: Percocet     ASK your doctor about these medications     * B-12 Compliance 1,000 mcg/mL kit; Generic drug: cyanocobalamin   (vitamin B-12); Inject 1 mL as directed every 30 (thirty) days.  * This list has 1 medication(s) that are the same as other medications   prescribed for you. Read the directions carefully, and ask your doctor or   other care provider to review them with you.     Outpatient Follow-Up  Future Appointments   Date Time Provider Department [place]   11/22/2024 12:00 PM VINNY Mello-CNP [id number] [place]   12/31/2024 11:30 AM Tesfaye Adam MD MPH [id number]   1/20/2025  1:40 PM Jacoby Casillas MD [id number] None       Yanira Negro PA-C    Total face to face time spent with patient/family of >30 minutes, with >50% of the time spent discussing plan of care/management, counseling/educating on disease processes, explaining results of diagnostic testing.

## 2024-11-09 LAB
GLUCOSE BLD MANUAL STRIP-MCNC: 110 MG/DL (ref 74–99)
GLUCOSE BLD MANUAL STRIP-MCNC: 117 MG/DL (ref 74–99)
GLUCOSE BLD MANUAL STRIP-MCNC: 165 MG/DL (ref 74–99)
GLUCOSE BLD MANUAL STRIP-MCNC: 93 MG/DL (ref 74–99)

## 2024-11-09 PROCEDURE — 2500000005 HC RX 250 GENERAL PHARMACY W/O HCPCS: Performed by: STUDENT IN AN ORGANIZED HEALTH CARE EDUCATION/TRAINING PROGRAM

## 2024-11-09 PROCEDURE — 97116 GAIT TRAINING THERAPY: CPT | Mod: GP,CQ

## 2024-11-09 PROCEDURE — 1100000001 HC PRIVATE ROOM DAILY

## 2024-11-09 PROCEDURE — 2500000005 HC RX 250 GENERAL PHARMACY W/O HCPCS

## 2024-11-09 PROCEDURE — 2500000001 HC RX 250 WO HCPCS SELF ADMINISTERED DRUGS (ALT 637 FOR MEDICARE OP): Performed by: STUDENT IN AN ORGANIZED HEALTH CARE EDUCATION/TRAINING PROGRAM

## 2024-11-09 PROCEDURE — 97530 THERAPEUTIC ACTIVITIES: CPT | Mod: GP,CQ

## 2024-11-09 PROCEDURE — 2500000002 HC RX 250 W HCPCS SELF ADMINISTERED DRUGS (ALT 637 FOR MEDICARE OP, ALT 636 FOR OP/ED): Performed by: STUDENT IN AN ORGANIZED HEALTH CARE EDUCATION/TRAINING PROGRAM

## 2024-11-09 PROCEDURE — 2500000001 HC RX 250 WO HCPCS SELF ADMINISTERED DRUGS (ALT 637 FOR MEDICARE OP): Performed by: PHYSICIAN ASSISTANT

## 2024-11-09 PROCEDURE — 2500000004 HC RX 250 GENERAL PHARMACY W/ HCPCS (ALT 636 FOR OP/ED): Performed by: STUDENT IN AN ORGANIZED HEALTH CARE EDUCATION/TRAINING PROGRAM

## 2024-11-09 PROCEDURE — 82947 ASSAY GLUCOSE BLOOD QUANT: CPT

## 2024-11-09 ASSESSMENT — PAIN DESCRIPTION - LOCATION: LOCATION: BACK

## 2024-11-09 ASSESSMENT — COGNITIVE AND FUNCTIONAL STATUS - GENERAL
MOVING TO AND FROM BED TO CHAIR: A LITTLE
WALKING IN HOSPITAL ROOM: A LITTLE
MOBILITY SCORE: 20
MOBILITY SCORE: 18
CLIMB 3 TO 5 STEPS WITH RAILING: A LITTLE
TURNING FROM BACK TO SIDE WHILE IN FLAT BAD: A LITTLE
MOVING FROM LYING ON BACK TO SITTING ON SIDE OF FLAT BED WITH BEDRAILS: A LITTLE
MOVING TO AND FROM BED TO CHAIR: A LITTLE
CLIMB 3 TO 5 STEPS WITH RAILING: A LITTLE
STANDING UP FROM CHAIR USING ARMS: A LITTLE
TURNING FROM BACK TO SIDE WHILE IN FLAT BAD: A LITTLE
DAILY ACTIVITIY SCORE: 24
STANDING UP FROM CHAIR USING ARMS: A LITTLE

## 2024-11-09 ASSESSMENT — PAIN SCALES - GENERAL
PAINLEVEL_OUTOF10: 3
PAINLEVEL_OUTOF10: 4
PAINLEVEL_OUTOF10: 0 - NO PAIN
PAINLEVEL_OUTOF10: 6
PAINLEVEL_OUTOF10: 1
PAINLEVEL_OUTOF10: 4

## 2024-11-09 ASSESSMENT — PAIN - FUNCTIONAL ASSESSMENT
PAIN_FUNCTIONAL_ASSESSMENT: 0-10
PAIN_FUNCTIONAL_ASSESSMENT: 0-10

## 2024-11-09 ASSESSMENT — PAIN DESCRIPTION - ORIENTATION: ORIENTATION: LOWER

## 2024-11-09 NOTE — PROGRESS NOTES
Physical Therapy    Physical Therapy Treatment    Patient Name: Gabby Madrid  MRN: [id number]  Department: [place] [id number]  Room: [facility]  Today's Date: 11/9/2024  Time Calculation  Start Time: 0912  Stop Time: 0940  Time Calculation (min): 28 min         Assessment/Plan   PT Assessment  End of Session Communication: Bedside nurse  Assessment Comment: Pt tolerated PT session well, continues to remain appropriate for Low intensity PT upon D/C from hospital.  End of Session Patient Position: Bed, 3 rail up, Alarm off, not on at start of session  PT Plan  Inpatient/Swing Bed or Outpatient: Inpatient  PT Plan  Treatment/Interventions: Bed mobility, Transfer training, Gait training, Stair training, Balance training, Strengthening, Endurance training, Range of motion, Therapeutic exercise, Therapeutic activity, Home exercise program  PT Plan: Ongoing PT  PT Frequency: Daily  PT Discharge Recommendations: Low intensity level of continued care  Equipment Recommended upon Discharge: Wheeled walker  PT Recommended Transfer Status: Stand by assist, Assistive device  PT - OK to Discharge: Yes      General Visit Information:   PT  Visit  PT Received On: 11/09/24  General  Missed Visit: No  Missed Visit Reason:  (n/a)  Family/Caregiver Present: No  Prior to Session Communication: Bedside nurse  Patient Position Received: Bed, 3 rail up, Alarm off, not on at start of session  General Comment: Pt supine in bed, agreeable to work with PT.    Subjective   Precautions:  Precautions  Medical Precautions: Fall precautions, Spinal precautions  Post-Surgical Precautions: Spinal precautions  Precautions Comment: Pt able to recall 3/3 spine precautions, required some cues to avoid excessive twisting when up in bathroom.    Vital Signs (Past 2hrs)                 Objective   Pain:  Pain Assessment  Pain Assessment: 0-10  0-10 (Numeric) Pain Score: 4  Pain Location: Back  Pain Interventions: Heat applied  Cognition:  Cognition  Overall  Cognitive Status: Within Functional Limits  Orientation Level: Oriented X4    Activity Tolerance:  Activity Tolerance  Endurance: Tolerates 10 - 20 min exercise with multiple rests  Treatments:  Therapeutic Activity  Therapeutic Activity Performed: Yes  Therapeutic Activity 1: Unsupported sitting, Heel Raises, LAQ x10 BLE  Therapeutic Activity 2: Pt tolerated dynamic standing activites at bathroom sink with 0-1 UE support, no LOBs or unsteadiness noted.  Therapeutic Activity 3: Education provided on use of hot packs and cold packs for pain, advising pt to not place hot packs on or close to incision but can use them on sides.    Bed Mobility  Bed Mobility: Yes  Bed Mobility 1  Bed Mobility 1: Supine to sitting  Level of Assistance 1: Contact guard, Minimal verbal cues  Bed Mobility Comments 1: Cues for log roll, HOB flat to mimic home set up  Bed Mobility 2  Bed Mobility  2: Sitting to supine  Level of Assistance 2: Contact guard    Ambulation/Gait Training  Ambulation/Gait Training Performed: Yes  Ambulation/Gait Training 1  Surface 1: Level tile  Device 1: Rolling walker  Assistance 1: Close supervision  Quality of Gait 1: Narrow base of support, Decreased step length (Decreased josé luis,)  Comments/Distance (ft) 1: 150ft x2, 10ft x2 (Pt reports slightly increased pain in back during ambulation,)    Transfers  Transfer: Yes  Transfer 1  Transfer From 1: Bed to  Transfer to 1: Stand  Technique 1: Sit to stand, Stand to sit  Transfer Device 1: Walker  Transfer Level of Assistance 1: Close supervision  Trials/Comments 1: x1 trial  Transfers 2  Transfer From 2: Stand to  Transfer to 2: Toilet  Technique 2: Stand to sit, Sit to stand  Transfer Device 2: Walker  Transfer Level of Assistance 2: Close supervision  Trials/Comments 2: x1    Stairs  Stairs: No    Outcome Measures:  [facility] Basic Mobility  Turning from your back to your side while in a flat bed without using bedrails: A little  Moving from lying on your back to  sitting on the side of a flat bed without using bedrails: A little  Moving to and from bed to chair (including a wheelchair): A little  Standing up from a chair using your arms (e.g. wheelchair or bedside chair): A little  To walk in hospital room: A little  Climbing 3-5 steps with railing: A little  Basic Mobility - Total Score: 18    Education Documentation  Precautions, taught by Ivonne Hazel PTA at 11/9/2024 10:44 AM.  Learner: Patient  Readiness: Acceptance  Method: Explanation  Response: Verbalizes Understanding  Comment: Use of hot/cold packs as necessary, increasing OOB activity    Body Mechanics, taught by Ivonne Hazel PTA at 11/9/2024 10:44 AM.  Learner: Patient  Readiness: Acceptance  Method: Explanation  Response: Verbalizes Understanding  Comment: Use of hot/cold packs as necessary, increasing OOB activity    Home Exercise Program, taught by Ivonne Hazel PTA at 11/9/2024 10:44 AM.  Learner: Patient  Readiness: Acceptance  Method: Explanation  Response: Verbalizes Understanding  Comment: Use of hot/cold packs as necessary, increasing OOB activity    Mobility Training, taught by Ivonne Hazel PTA at 11/9/2024 10:44 AM.  Learner: Patient  Readiness: Acceptance  Method: Explanation  Response: Verbalizes Understanding  Comment: Use of hot/cold packs as necessary, increasing OOB activity    Education Comments  No comments found.        OP EDUCATION:       Encounter Problems       Encounter Problems (Active)       Balance       STG - Maintains independent dynamic standing balance with upper extremity support using a wheeled walker. (Progressing)       Start:  11/06/24    Expected End:  11/20/24            STG - Maintains independent static standing balance with upper extremity support using a wheeled walker. (Progressing)       Start:  11/06/24    Expected End:  11/20/24               Mobility       STG - Patient will ambulate 125ft, independently using a wheeled walker. (Progressing)        Start:  11/06/24    Expected End:  11/20/24            STG - Patient will ascend and descend a flight of stairs, independently using one rail and a standard cane. (Progressing)       Start:  11/06/24    Expected End:  11/20/24               PT Transfers       STG - Transfer from bed to chair, independently using a wheeled walker. (Progressing)       Start:  11/06/24    Expected End:  11/20/24            STG - Patient to transfer to and from sit to supine, independently. (Progressing)       Start:  11/06/24    Expected End:  11/20/24            STG - Patient will transfer sit to and from stand, independently using a wheeled walker. (Progressing)       Start:  11/06/24    Expected End:  11/20/24 11/09/24 at 10:45 AM   Ivonne Hazel [facility] [place]   Rehab Office: [phone]

## 2024-11-09 NOTE — CARE PLAN
The patient's goals for the shift include      The clinical goals for the shift include pt will remain safe and have pain managed throughout my shift      Problem: Skin  Goal: Decreased wound size/increased tissue granulation at next dressing change  Outcome: Progressing  Goal: Participates in plan/prevention/treatment measures  Outcome: Progressing  Goal: Prevent/manage excess moisture  Outcome: Progressing  Goal: Prevent/minimize sheer/friction injuries  Outcome: Progressing  Goal: Promote/optimize nutrition  Outcome: Progressing  Goal: Promote skin healing  Outcome: Progressing     Problem: Pain  Goal: Takes deep breaths with improved pain control throughout the shift  Outcome: Progressing  Goal: Turns in bed with improved pain control throughout the shift  Outcome: Progressing  Goal: Walks with improved pain control throughout the shift  Outcome: Progressing  Goal: Performs ADL's with improved pain control throughout shift  Outcome: Progressing  Goal: Participates in PT with improved pain control throughout the shift  Outcome: Progressing  Goal: Free from opioid side effects throughout the shift  Outcome: Progressing  Goal: Free from acute confusion related to pain meds throughout the shift  Outcome: Progressing     Problem: Fall/Injury  Goal: Not fall by end of shift  Outcome: Progressing  Goal: Be free from injury by end of the shift  Outcome: Progressing  Goal: Verbalize understanding of personal risk factors for fall in the hospital  Outcome: Progressing  Goal: Verbalize understanding of risk factor reduction measures to prevent injury from fall in the home  Outcome: Progressing  Goal: Use assistive devices by end of the shift  Outcome: Progressing  Goal: Pace activities to prevent fatigue by end of the shift  Outcome: Progressing

## 2024-11-09 NOTE — PROGRESS NOTES
"Gabby Madrid is a 74 y.o. female on day 3 of admission presenting with Lumbar radiculopathy.    Subjective   Radiculopathy improved       Objective     Physical Exam  Aox3  BUE 5  LLE HF/KE/DF/PF 5  RLE HF5 w/ persistence, KE/DF/PF 5  SILT  Incision cdi      Last Recorded Vitals  Blood pressure 134/71, pulse 94, temperature 36 °C (96.8 °F), temperature source Temporal, resp. rate 18, height 1.68 m (5' 6.14\"), weight (!) 162 kg (357 lb 2.3 oz), SpO2 92%.  Intake/Output last 3 Shifts:  I/O last 3 completed shifts:  In: - (0 mL/kg)   Out: 276 (1.7 mL/kg) [Urine:200 (0 mL/kg/hr); Drains:75; Stool:1]  Weight: 162 kg     Relevant Results                                Assessment/Plan   Assessment & Plan  Lumbar radiculopathy    Renal cancer (Multi)    Gastrointestinal hemorrhage with melena    Chronic low back pain    Chronic neck pain    Peripheral vascular disease (CMS-HCC)    Asthma    Seasonal allergies    Pseudotumor cerebri    Chronic TMJ pain    Vision loss    PUD (peptic ulcer disease)    History of hysterectomy    Abdominal pregnancy without intrauterine pregnancy (HHS-HCC)    H/o HTN, HLD, DMII, HCV, prior L5-S1 TLIF ([place]) p/w BLE (L>R) radiculopathy and back pain, 11/5 s/p L4-S1 revision fusion, L4-5 TLIF    11/6 uprights hardware in posn    PLAN    Floor, drain, needs BM before dispo, PTOT-home care           Andreas Vidal MD      "

## 2024-11-09 NOTE — CARE PLAN
Problem: Skin  Goal: Decreased wound size/increased tissue granulation at next dressing change  Outcome: Progressing     Problem: Skin  Goal: Participates in plan/prevention/treatment measures  Outcome: Progressing   The patient's goals for the shift include      The clinical goals for the shift include safety    No acute events. Hemovac output monitored, incision intact. OOB with standby assist and walker. Blood glucose monitored. Voiding without difficulty. Patient resting between care.

## 2024-11-10 VITALS
HEART RATE: 84 BPM | TEMPERATURE: 97.4 F | BODY MASS INDEX: 47.09 KG/M2 | WEIGHT: 293 LBS | SYSTOLIC BLOOD PRESSURE: 122 MMHG | DIASTOLIC BLOOD PRESSURE: 71 MMHG | HEIGHT: 66 IN | RESPIRATION RATE: 17 BRPM | OXYGEN SATURATION: 93 %

## 2024-11-10 LAB
GLUCOSE BLD MANUAL STRIP-MCNC: 117 MG/DL (ref 74–99)
GLUCOSE BLD MANUAL STRIP-MCNC: 155 MG/DL (ref 74–99)
GLUCOSE BLD MANUAL STRIP-MCNC: 156 MG/DL (ref 74–99)
GLUCOSE BLD MANUAL STRIP-MCNC: 158 MG/DL (ref 74–99)

## 2024-11-10 PROCEDURE — 97530 THERAPEUTIC ACTIVITIES: CPT | Mod: GP

## 2024-11-10 PROCEDURE — 2500000002 HC RX 250 W HCPCS SELF ADMINISTERED DRUGS (ALT 637 FOR MEDICARE OP, ALT 636 FOR OP/ED): Performed by: STUDENT IN AN ORGANIZED HEALTH CARE EDUCATION/TRAINING PROGRAM

## 2024-11-10 PROCEDURE — 2500000001 HC RX 250 WO HCPCS SELF ADMINISTERED DRUGS (ALT 637 FOR MEDICARE OP): Performed by: PHYSICIAN ASSISTANT

## 2024-11-10 PROCEDURE — 2500000004 HC RX 250 GENERAL PHARMACY W/ HCPCS (ALT 636 FOR OP/ED): Performed by: STUDENT IN AN ORGANIZED HEALTH CARE EDUCATION/TRAINING PROGRAM

## 2024-11-10 PROCEDURE — 82947 ASSAY GLUCOSE BLOOD QUANT: CPT

## 2024-11-10 PROCEDURE — 1100000001 HC PRIVATE ROOM DAILY

## 2024-11-10 PROCEDURE — 2500000005 HC RX 250 GENERAL PHARMACY W/O HCPCS: Performed by: STUDENT IN AN ORGANIZED HEALTH CARE EDUCATION/TRAINING PROGRAM

## 2024-11-10 PROCEDURE — 2500000001 HC RX 250 WO HCPCS SELF ADMINISTERED DRUGS (ALT 637 FOR MEDICARE OP): Performed by: STUDENT IN AN ORGANIZED HEALTH CARE EDUCATION/TRAINING PROGRAM

## 2024-11-10 ASSESSMENT — COGNITIVE AND FUNCTIONAL STATUS - GENERAL
MOVING TO AND FROM BED TO CHAIR: A LITTLE
MOBILITY SCORE: 18
CLIMB 3 TO 5 STEPS WITH RAILING: A LITTLE
TURNING FROM BACK TO SIDE WHILE IN FLAT BAD: A LITTLE
STANDING UP FROM CHAIR USING ARMS: A LITTLE
MOBILITY SCORE: 20
MOVING FROM LYING ON BACK TO SITTING ON SIDE OF FLAT BED WITH BEDRAILS: A LITTLE
DAILY ACTIVITIY SCORE: 24
STANDING UP FROM CHAIR USING ARMS: A LITTLE
TURNING FROM BACK TO SIDE WHILE IN FLAT BAD: A LITTLE
DAILY ACTIVITIY SCORE: 24
STANDING UP FROM CHAIR USING ARMS: A LITTLE
CLIMB 3 TO 5 STEPS WITH RAILING: A LITTLE
MOBILITY SCORE: 21
WALKING IN HOSPITAL ROOM: A LITTLE
TURNING FROM BACK TO SIDE WHILE IN FLAT BAD: A LITTLE

## 2024-11-10 ASSESSMENT — PAIN SCALES - GENERAL
PAINLEVEL_OUTOF10: 6
PAINLEVEL_OUTOF10: 5 - MODERATE PAIN
PAINLEVEL_OUTOF10: 3
PAINLEVEL_OUTOF10: 2
PAINLEVEL_OUTOF10: 4

## 2024-11-10 ASSESSMENT — PAIN - FUNCTIONAL ASSESSMENT: PAIN_FUNCTIONAL_ASSESSMENT: 0-10

## 2024-11-10 NOTE — PROGRESS NOTES
"Gabby Madrid is a 74 y.o. female on day 4 of admission presenting with Lumbar radiculopathy.    Subjective   Radiculopathy improved, still having only liquid bowel movements, desires discharge tomorrow pending improved pain       Objective     Physical Exam  Aox3  BUE 5  LLE HF/KE/DF/PF 5  RLE HF5 w/ persistence, KE/DF/PF 5  SILT  Incision cdi      Last Recorded Vitals  Blood pressure 126/72, pulse 84, temperature 36.3 °C (97.3 °F), temperature source Temporal, resp. rate 18, height 1.68 m (5' 6.14\"), weight (!) 162 kg (357 lb 2.3 oz), SpO2 92%.  Intake/Output last 3 Shifts:  I/O last 3 completed shifts:  In: - (0 mL/kg)   Out: 53 (0.3 mL/kg) [Drains:53]  Weight: 162 kg     Relevant Results                                Assessment/Plan   Assessment & Plan  Lumbar radiculopathy    Renal cancer (Multi)    Gastrointestinal hemorrhage with melena    Chronic low back pain    Chronic neck pain    Peripheral vascular disease (CMS-HCC)    Asthma    Seasonal allergies    Pseudotumor cerebri    Chronic TMJ pain    Vision loss    PUD (peptic ulcer disease)    History of hysterectomy    Abdominal pregnancy without intrauterine pregnancy (HHS-HCC)    H/o HTN, HLD, DMII, HCV, prior L5-S1 TLIF ([place]) p/w BLE (L>R) radiculopathy and back pain, 11/5 s/p L4-S1 revision fusion, L4-5 TLIF    11/6 uprights hardware in posn    PLAN    Floor,   Maintain drain  PTOT-home care           Raymond Chavarria MD      "

## 2024-11-10 NOTE — CARE PLAN
Problem: Skin  Goal: Decreased wound size/increased tissue granulation at next dressing change  Outcome: Progressing     Problem: Skin  Goal: Participates in plan/prevention/treatment measures  Outcome: Progressing   The patient's goals for the shift include      The clinical goals for the shift include pt pain to be controlled and pt will remain safe during shift.    No acute events. Hemovac removed per NSGY. Patient reporting soreness in mouth, no open sores noted. NSGY notified and to bedside to assess. Patient otherwise resting comfortably between care. Possible DC home tomorrow.

## 2024-11-10 NOTE — PROGRESS NOTES
Physical Therapy    Physical Therapy Treatment    Patient Name: Gabby Madrid  MRN: [id number]  Department: [place] [address]  [facility]  Today's Date: 11/10/2024  Time Calculation  Start Time: 0914  Stop Time: 0937  Time Calculation (min): 23 min         Assessment/Plan   PT Assessment  Evaluation/Treatment Tolerance: Patient tolerated treatment well, Patient limited by pain  Medical Staff Made Aware: Yes  End of Session Communication: Bedside nurse  Assessment Comment: Pt. is progressing appropriately, tolerating bed mobility, sit<>stand transfers x2 trials, hallway ambulation, and stair negotiation with FWW and close supervision. Pt  limited by R hip pain this date, continues to demo decreased functional strength, decreased activity tolerance/endurance, mildly impaired balance, and increased difficulty with functional mobility compared to baseline. Pt. will continue to benefit from skilled PT intervention while inpatient to address deficits and maximize return to PLOF. Pt remains appropriate for LOW intensity PT upon discharge.  End of Session Patient Position: Up in chair, Alarm on    PT Plan  Inpatient/Swing Bed or Outpatient: Inpatient  PT Plan  Treatment/Interventions: Bed mobility, Transfer training, Gait training, Stair training, Balance training, Strengthening, Endurance training, Range of motion, Therapeutic exercise, Therapeutic activity, Home exercise program  PT Plan: Ongoing PT  PT Frequency: Daily  PT Discharge Recommendations: Low intensity level of continued care  Equipment Recommended upon Discharge: Wheeled walker  PT Recommended Transfer Status: Stand by assist, Assistive device  PT - OK to Discharge: Yes      General Visit Information:   PT  Visit  PT Received On: 11/10/24  Response to Previous Treatment: Patient with no complaints from previous session.  General  Family/Caregiver Present: No  Prior to Session Communication: Bedside nurse  Patient Position Received: Bed, 3 rail up, Alarm off,  "not on at start of session  General Comment: Pt received supine in bed, pleasant and agreeable to participate in session. Pt endorsing increased R hip pain with functional mobility, reports new this date.    Subjective   Precautions:  Precautions  Medical Precautions: Fall precautions, Spinal precautions  Post-Surgical Precautions: Spinal precautions  Precautions Comment: Pt able to recall 2/3 spinal precautions without cues, required hints for \"twisting\"        Objective   Pain:  Pain Assessment  Pain Assessment: 0-10  0-10 (Numeric) Pain Score: 4  Pain Type: Acute pain  Pain Location: Hip  Pain Orientation: Right    Cognition:  Cognition  Overall Cognitive Status: Within Functional Limits  Orientation Level: Oriented X4    Activity Tolerance:  Activity Tolerance  Endurance: Tolerates 10 - 20 min exercise with multiple rests  Early Mobility/Exercise Safety Screen: Proceed with mobilization - No exclusion criteria met    Treatments:  Therapeutic Exercise  Therapeutic Exercise Performed: Yes  Therapeutic Exercise Activity 1: EOB: AP x10 srini, LAQ x15 srini    Therapeutic Activity  Therapeutic Activity Performed: Yes  Therapeutic Activity 1: Pt completed dynamic standing activities in bathroom, ~5 min with close supervision, intermittent UE support as needed. No acute LOB noted    Bed Mobility  Bed Mobility: Yes  Bed Mobility 1  Bed Mobility 1: Supine to sitting  Level of Assistance 1: Contact guard, Minimal verbal cues  Bed Mobility Comments 1: HOB flat, cues for log roll, pt required increased time to complete    Ambulation/Gait Training  Ambulation/Gait Training Performed: Yes  Ambulation/Gait Training 1  Surface 1: Level tile  Device 1: Rolling walker  Assistance 1: Close supervision  Quality of Gait 1: Decreased step length, Antalgic, Forward flexed posture (slow josé luis)  Comments/Distance (ft) 1: 70 ft x2, completed stair training between trials (Pt declined further progression of mobility 2/2 increased R hip " pain)    Transfers  Transfer: Yes  Transfer 1  Transfer From 1: Sit to, Stand to  Transfer to 1: Stand, Sit  Technique 1: Sit to stand, Stand to sit  Transfer Device 1: Walker  Transfer Level of Assistance 1: Close supervision  Trials/Comments 1: x2 trials, bed vs toilet.    Stairs  Stairs: Yes  Stairs  Rails 1:  (L for descent, R for ascent)  Device 1: Railing  Assistance 1: Contact guard  Comment/Number of Steps 1: ascend/descend x3 stairs with non-reciprocal pattern. (Cues to lead with R LE for descent and L LE for ascent 2/2 increased R hip pain)    Outcome Measures:  [facility] Basic Mobility  Turning from your back to your side while in a flat bed without using bedrails: A little  Moving from lying on your back to sitting on the side of a flat bed without using bedrails: A little  Moving to and from bed to chair (including a wheelchair): A little  Standing up from a chair using your arms (e.g. wheelchair or bedside chair): A little  To walk in hospital room: A little  Climbing 3-5 steps with railing: A little  Basic Mobility - Total Score: 18    Education Documentation  Precautions, taught by Bernadette Szymanski PT at 11/10/2024  9:45 AM.  Learner: Patient  Readiness: Acceptance  Method: Explanation, Demonstration  Response: Verbalizes Understanding, Demonstrated Understanding  Comment: reviewed spinal precautions, transfer techniques, stair training    Body Mechanics, taught by Bernadette Szymanski PT at 11/10/2024  9:45 AM.  Learner: Patient  Readiness: Acceptance  Method: Explanation, Demonstration  Response: Verbalizes Understanding, Demonstrated Understanding  Comment: reviewed spinal precautions, transfer techniques, stair training    Home Exercise Program, taught by Bernadette Szymanski PT at 11/10/2024  9:45 AM.  Learner: Patient  Readiness: Acceptance  Method: Explanation, Demonstration  Response: Verbalizes Understanding, Demonstrated Understanding  Comment: reviewed spinal precautions, transfer techniques, stair  training    Mobility Training, taught by Bernadette Szymanski, PT at 11/10/2024  9:45 AM.  Learner: Patient  Readiness: Acceptance  Method: Explanation, Demonstration  Response: Verbalizes Understanding, Demonstrated Understanding  Comment: reviewed spinal precautions, transfer techniques, stair training    Education Comments  No comments found.        OP EDUCATION:       Encounter Problems       Encounter Problems (Active)       Balance       STG - Maintains independent dynamic standing balance with upper extremity support using a wheeled walker. (Progressing)       Start:  11/06/24    Expected End:  11/20/24            STG - Maintains independent static standing balance with upper extremity support using a wheeled walker. (Progressing)       Start:  11/06/24    Expected End:  11/20/24               Mobility       STG - Patient will ambulate 125ft, independently using a wheeled walker. (Progressing)       Start:  11/06/24    Expected End:  11/20/24            STG - Patient will ascend and descend a flight of stairs, independently using one rail and a standard cane. (Progressing)       Start:  11/06/24    Expected End:  11/20/24               PT Transfers       STG - Transfer from bed to chair, independently using a wheeled walker. (Progressing)       Start:  11/06/24    Expected End:  11/20/24            STG - Patient to transfer to and from sit to supine, independently. (Progressing)       Start:  11/06/24    Expected End:  11/20/24            STG - Patient will transfer sit to and from stand, independently using a wheeled walker. (Progressing)       Start:  11/06/24    Expected End:  11/20/24                     11/10/24 at 9:46 AM - Bernadette Szymanski PT

## 2024-11-11 ENCOUNTER — PHARMACY VISIT (OUTPATIENT)
Dept: PHARMACY | Facility: CLINIC | Age: 74
End: 2024-11-11
Payer: COMMERCIAL

## 2024-11-11 VITALS
RESPIRATION RATE: 18 BRPM | DIASTOLIC BLOOD PRESSURE: 70 MMHG | HEART RATE: 65 BPM | OXYGEN SATURATION: 93 % | SYSTOLIC BLOOD PRESSURE: 120 MMHG | BODY MASS INDEX: 47.09 KG/M2 | HEIGHT: 66 IN | WEIGHT: 293 LBS | TEMPERATURE: 98.6 F

## 2024-11-11 LAB
GLUCOSE BLD MANUAL STRIP-MCNC: 101 MG/DL (ref 74–99)
GLUCOSE BLD MANUAL STRIP-MCNC: 167 MG/DL (ref 74–99)
GLUCOSE BLD MANUAL STRIP-MCNC: 180 MG/DL (ref 74–99)

## 2024-11-11 PROCEDURE — 82947 ASSAY GLUCOSE BLOOD QUANT: CPT

## 2024-11-11 PROCEDURE — 2500000001 HC RX 250 WO HCPCS SELF ADMINISTERED DRUGS (ALT 637 FOR MEDICARE OP): Performed by: STUDENT IN AN ORGANIZED HEALTH CARE EDUCATION/TRAINING PROGRAM

## 2024-11-11 PROCEDURE — 2500000001 HC RX 250 WO HCPCS SELF ADMINISTERED DRUGS (ALT 637 FOR MEDICARE OP): Performed by: PHYSICIAN ASSISTANT

## 2024-11-11 PROCEDURE — 2500000001 HC RX 250 WO HCPCS SELF ADMINISTERED DRUGS (ALT 637 FOR MEDICARE OP)

## 2024-11-11 PROCEDURE — 2500000005 HC RX 250 GENERAL PHARMACY W/O HCPCS: Performed by: STUDENT IN AN ORGANIZED HEALTH CARE EDUCATION/TRAINING PROGRAM

## 2024-11-11 PROCEDURE — 2500000004 HC RX 250 GENERAL PHARMACY W/ HCPCS (ALT 636 FOR OP/ED): Performed by: STUDENT IN AN ORGANIZED HEALTH CARE EDUCATION/TRAINING PROGRAM

## 2024-11-11 PROCEDURE — 2500000002 HC RX 250 W HCPCS SELF ADMINISTERED DRUGS (ALT 637 FOR MEDICARE OP, ALT 636 FOR OP/ED): Performed by: STUDENT IN AN ORGANIZED HEALTH CARE EDUCATION/TRAINING PROGRAM

## 2024-11-11 ASSESSMENT — COGNITIVE AND FUNCTIONAL STATUS - GENERAL
STANDING UP FROM CHAIR USING ARMS: A LITTLE
CLIMB 3 TO 5 STEPS WITH RAILING: A LITTLE
MOVING TO AND FROM BED TO CHAIR: A LITTLE
WALKING IN HOSPITAL ROOM: A LITTLE
DRESSING REGULAR LOWER BODY CLOTHING: A LITTLE
MOBILITY SCORE: 20
DAILY ACTIVITIY SCORE: 23

## 2024-11-11 ASSESSMENT — PAIN - FUNCTIONAL ASSESSMENT
PAIN_FUNCTIONAL_ASSESSMENT: 0-10
PAIN_FUNCTIONAL_ASSESSMENT: 0-10

## 2024-11-11 ASSESSMENT — PAIN SCALES - GENERAL
PAINLEVEL_OUTOF10: 3
PAINLEVEL_OUTOF10: 8

## 2024-11-11 ASSESSMENT — PAIN DESCRIPTION - LOCATION: LOCATION: BACK

## 2024-11-11 NOTE — PROGRESS NOTES
"Gabby Madrid is a 74 y.o. female on day 5 of admission presenting with Lumbar radiculopathy.    Subjective   Patient has had improved management of pain, only complains of dryness of throat, believes that today may be okay for discharge       Objective     Physical Exam  Aox3  BUE 5  LLE HF/KE/DF/PF 5  RLE HF5 w/ persistence, KE/DF/PF 5  SILT  Incision cdi      Last Recorded Vitals  Blood pressure 120/70, pulse 65, temperature 37 °C (98.6 °F), temperature source Temporal, resp. rate 18, height 1.68 m (5' 6.14\"), weight (!) 162 kg (357 lb 2.3 oz), SpO2 93%.  Intake/Output last 3 Shifts:  I/O last 3 completed shifts:  In: 15 (0.1 mL/kg) [Other:15]  Out: - (0 mL/kg)   Weight: 162 kg     Relevant Results                                Assessment/Plan   Assessment & Plan  Lumbar radiculopathy    Renal cancer (Multi)    Gastrointestinal hemorrhage with melena    Chronic low back pain    Chronic neck pain    Peripheral vascular disease (CMS-HCC)    Asthma    Seasonal allergies    Pseudotumor cerebri    Chronic TMJ pain    Vision loss    PUD (peptic ulcer disease)    History of hysterectomy    Abdominal pregnancy without intrauterine pregnancy (HHS-HCC)    H/o HTN, HLD, DMII, HCV, prior L5-S1 TLIF ([place]) p/w BLE (L>R) radiculopathy and back pain, 11/5 s/p L4-S1 revision fusion, L4-5 TLIF    11/6 uprights hardware in posn    PLAN    Floor,   Maintain drain  PTOT-home care           Raymond Chavarria MD      "

## 2024-11-11 NOTE — CARE PLAN
The patient's goals for the shift include      The clinical goals for the shift include pt pain to be controlled and pt will remain safe during shift.      Problem: Skin  Goal: Decreased wound size/increased tissue granulation at next dressing change  Outcome: Progressing  Goal: Participates in plan/prevention/treatment measures  Outcome: Progressing  Goal: Prevent/manage excess moisture  Outcome: Progressing  Goal: Prevent/minimize sheer/friction injuries  Outcome: Progressing  Goal: Promote/optimize nutrition  Outcome: Progressing  Goal: Promote skin healing  Outcome: Progressing     Problem: Pain  Goal: Takes deep breaths with improved pain control throughout the shift  Outcome: Progressing  Goal: Turns in bed with improved pain control throughout the shift  Outcome: Progressing  Goal: Walks with improved pain control throughout the shift  Outcome: Progressing  Goal: Performs ADL's with improved pain control throughout shift  Outcome: Progressing  Goal: Participates in PT with improved pain control throughout the shift  Outcome: Progressing  Goal: Free from opioid side effects throughout the shift  Outcome: Progressing  Goal: Free from acute confusion related to pain meds throughout the shift  Outcome: Progressing     Problem: Fall/Injury  Goal: Not fall by end of shift  Outcome: Progressing  Goal: Be free from injury by end of the shift  Outcome: Progressing  Goal: Verbalize understanding of personal risk factors for fall in the hospital  Outcome: Progressing  Goal: Verbalize understanding of risk factor reduction measures to prevent injury from fall in the home  Outcome: Progressing  Goal: Use assistive devices by end of the shift  Outcome: Progressing  Goal: Pace activities to prevent fatigue by end of the shift  Outcome: Progressing

## 2024-11-11 NOTE — CARE PLAN
The patient's goals for the shift include pt will rate pain 5/10 or less    The clinical goals for the shift include pt will remain safe and utilize call light

## 2024-11-11 NOTE — DISCHARGE SUMMARY
Discharge Diagnosis  Lumbar radiculopathy    Issues Requiring Follow-Up  Standard post op care    Test Results Pending At Discharge  Pending Labs       No current pending labs.            Hospital Course  Gabby Madrid is a 74 yeart old female with PMH of HTN, HLD, DMII, HCV and prior L5-S1 TLIF ([place]) who presented to clinic with BLE (L>R) radiculopathy and back pain. Patient taken to the OR on 11/5 for L4-S1 revision fusion, L4-5 TLIF and admitted to the neurosurgery service post operatively.     11/6 upright XR with hardware intact  11/9 Drain removed     PT/OT eval and recommend low level therapy at discharge for which referral placed for home health care.   On the day of discharge, the patient was seen and evaluated by the neurosurgery team and deemed suitable for discharge.  The patient was given detailed discharge instructions and were scheduled to follow up as an outpatient.    Pertinent Physical Exam At Time of Discharge  Physical Exam  HENT:      Head: Normocephalic.   Eyes:      Pupils: Pupils are equal, round, and reactive to light.   Cardiovascular:      Pulses: Normal pulses.   Pulmonary:      Effort: Pulmonary effort is normal.   Abdominal:      Palpations: Abdomen is soft.   Musculoskeletal:         General: Normal range of motion.   Skin:     General: Skin is warm.   Neurological:      Mental Status: She is alert and oriented to person, place, and time.      Comments: 5/5x4         Home Medications     Medication List      START taking these medications     acetaminophen 325 mg tablet; Commonly known as: Tylenol; Take 2 tablets   (650 mg) by mouth every 6 hours if needed for mild pain (1 - 3) for up to   7 days.   cyclobenzaprine 10 mg tablet; Commonly known as: Flexeril; Take 1 tablet   (10 mg) by mouth 3 times a day as needed for muscle spasms.   lidocaine 4 % patch; Place 2 patches over 12 hours on the skin once   daily. Remove & discard patch within 12 hours or as directed by MD.   oxyCODONE 5  mg immediate release tablet; Commonly known as: Roxicodone;   Take 1 tablet (5 mg) by mouth every 6 hours if needed for severe pain (7 -   10).   sennosides-docusate sodium 8.6-50 mg tablet; Commonly known as:   Fay-Colace; Take 2 tablets by mouth 2 times a day. FOR CONSTIPATION WHILE   TAKING PAIN MEDICATION     CONTINUE taking these medications     alpha tocopherol 670 mg (1,000 unit) capsule; Commonly known as: Vitamin   E   amLODIPine 5 mg tablet; Commonly known as: Norvasc; TAKE 1 TABLET EVERY   DAY   ascorbic acid (vitamin C) 1,000 mg ER tablet   aspirin 81 mg EC tablet   atorvastatin 20 mg tablet; Commonly known as: Lipitor; TAKE 1/2 TABLET   EVERY DAY   blood-glucose meter misc; Check blood sugars daily for diagnosis 11.9   non-insulin-dependent   cholecalciferol 50 mcg (2,000 unit) capsule; Commonly known as: Vitamin   D-3   * cyanocobalamin 1,000 mcg/mL injection; Commonly known as: Vitamin   B-12; INJECT 1 ML AS DIRECTED EVERY 30 DAYS.   DULoxetine 60 mg DR capsule; Commonly known as: Cymbalta; TAKE 1 CAPSULE   EVERY DAY   hydroCHLOROthiazide 50 mg tablet; Commonly known as: HYDRODiuril   multivitamin with minerals tablet   neomycin-polymyxin-dexAMETHasone 3.5mg/mL-10,000 unit/mL-0.1 %   ophthalmic suspension; Commonly known as: Maxitrol   OneTouch Delica Plus Lancet 33 gauge misc; Generic drug: lancets   OneTouch Verio test strips strip; Generic drug: blood sugar diagnostic;   1 strip by percutaneous route once daily. Test blood sugar once daily. DX   Code E11.9 Non Insulin Dependent   oxybutynin 5 mg tablet; Commonly known as: Ditropan; Take 1 tablet (5   mg) by mouth 2 times a day.   polyethylene glycol 17 gram/dose powder; Commonly known as: Glycolax,   Miralax   potassium chloride CR 20 mEq ER tablet; Commonly known as: Klor-Con M20   pregabalin 150 mg capsule; Commonly known as: Lyrica   traZODone 50 mg tablet; Commonly known as: Desyrel; TAKE 1 TO 2 TABLETS   AT BEDTIME AS NEEDED FOR SLEEP  * This  list has 1 medication(s) that are the same as other medications   prescribed for you. Read the directions carefully, and ask your doctor or   other care provider to review them with you.     STOP taking these medications     chlorhexidine 0.12 % solution; Commonly known as: Peridex   chlorhexidine 4 % external liquid; Commonly known as: Hibiclens   nystatin 100,000 unit/gram powder; Commonly known as: Mycostatin   oxyCODONE-acetaminophen 5-325 mg tablet; Commonly known as: Percocet     ASK your doctor about these medications     * B-12 Compliance 1,000 mcg/mL kit; Generic drug: cyanocobalamin   (vitamin B-12); Inject 1 mL as directed every 30 (thirty) days.  * This list has 1 medication(s) that are the same as other medications   prescribed for you. Read the directions carefully, and ask your doctor or   other care provider to review them with you.       Outpatient Follow-Up  Future Appointments   Date Time Provider Department Center   11/22/2024 12:00 PM EFRAIN Mello [id number] [place]   12/31/2024 11:30 AM Tesfaye Adam MD MPH [id number] [facility]   1/20/2025  1:40 PM Jacoby Casillas MD [id number] None       EFRAIN Arias  Total face to face time spent with patient/family of 30 minutes, with >50% of the time spent discussing plan of care/management, counseling/educating on disease processes, explaining results of diagnostic testing.

## 2024-11-11 NOTE — PROGRESS NOTES
Gabby Madrid is a 74 y.o. female on day 5 of admission presenting with Lumbar radiculopathy.    Transitional Care Coordination Progress Note:  Patient discussed during interdisciplinary rounds.   Team members present: MD and TCC  Plan per Medical/Surgical team: Patient medically ready to discharge home with [facility] for PT/OT.  Payor: Medicare-IMM given 11/11/24 at 10:30 Am.  Discharge disposition: Home with [facility] SOC 11/12/24.  Potential Barriers: None  ADOD: 11/11/24    DEIRDRE PEREZ RN

## 2024-11-12 ENCOUNTER — HOME CARE VISIT (OUTPATIENT)
Dept: HOME HEALTH SERVICES | Facility: HOME HEALTH | Age: 74
End: 2024-11-12
Payer: MEDICARE

## 2024-11-12 VITALS
HEART RATE: 78 BPM | SYSTOLIC BLOOD PRESSURE: 120 MMHG | DIASTOLIC BLOOD PRESSURE: 62 MMHG | OXYGEN SATURATION: 99 % | TEMPERATURE: 97.5 F | RESPIRATION RATE: 18 BRPM

## 2024-11-12 PROCEDURE — G0299 HHS/HOSPICE OF RN EA 15 MIN: HCPCS | Mod: HHH

## 2024-11-12 PROCEDURE — 169592 NO-PAY CLAIM PROCEDURE

## 2024-11-12 ASSESSMENT — ACTIVITIES OF DAILY LIVING (ADL)
ENTERING_EXITING_HOME: STAND BY ASSIST
AMBULATION ASSISTANCE: STAND BY ASSIST
OASIS_M1830: 03
CURRENT_FUNCTION: STAND BY ASSIST

## 2024-11-12 ASSESSMENT — ENCOUNTER SYMPTOMS
PERSON REPORTING PAIN: PATIENT
PAIN LOCATION - PAIN SEVERITY: 8/10
LOWEST PAIN SEVERITY IN PAST 24 HOURS: 2/10
PAIN: 1
HIGHEST PAIN SEVERITY IN PAST 24 HOURS: 8/10
LIMITED RANGE OF MOTION: 1
PAIN LOCATION: BACK
PAIN LOCATION - PAIN FREQUENCY: INTERMITTENT
MUSCLE WEAKNESS: 1
CHANGE IN APPETITE: UNCHANGED
PAIN SEVERITY GOAL: 0/10
PAIN LOCATION - PAIN QUALITY: ACHING
APPETITE LEVEL: GOOD

## 2024-11-13 ENCOUNTER — HOME CARE VISIT (OUTPATIENT)
Dept: HOME HEALTH SERVICES | Facility: HOME HEALTH | Age: 74
End: 2024-11-13
Payer: MEDICARE

## 2024-11-13 PROCEDURE — G0151 HHCP-SERV OF PT,EA 15 MIN: HCPCS | Mod: HHH

## 2024-11-13 ASSESSMENT — ENCOUNTER SYMPTOMS
LOWEST PAIN SEVERITY IN PAST 24 HOURS: 3/10
PAIN: 1
HIGHEST PAIN SEVERITY IN PAST 24 HOURS: 10/10
OCCASIONAL FEELINGS OF UNSTEADINESS: 0
PERSON REPORTING PAIN: PATIENT
PAIN LOCATION: BACK

## 2024-11-13 ASSESSMENT — ACTIVITIES OF DAILY LIVING (ADL): AMBULATION ASSISTANCE ON FLAT SURFACES: 1

## 2024-11-13 NOTE — HOME HEALTH
slade seen for pt edmond today with  present.  she had a revision of a lumbar fusion on 11.5.  the origianl fusion was done approximately 8 years ago .  prior to this she walked with no device , indep with dressing and bathing .  they live in a home where everything she nededs is on the first floor and they use the neighbors drive for her to enter and exit with 2 steps otherwise there are approximatley 14 steps to get to the garage where the car is

## 2024-11-18 ENCOUNTER — HOME CARE VISIT (OUTPATIENT)
Dept: HOME HEALTH SERVICES | Facility: HOME HEALTH | Age: 74
End: 2024-11-18
Payer: MEDICARE

## 2024-11-18 PROCEDURE — G0299 HHS/HOSPICE OF RN EA 15 MIN: HCPCS | Mod: HHH

## 2024-11-19 ENCOUNTER — HOME CARE VISIT (OUTPATIENT)
Dept: HOME HEALTH SERVICES | Facility: HOME HEALTH | Age: 74
End: 2024-11-19
Payer: MEDICARE

## 2024-11-19 VITALS
DIASTOLIC BLOOD PRESSURE: 64 MMHG | OXYGEN SATURATION: 96 % | RESPIRATION RATE: 18 BRPM | TEMPERATURE: 98 F | HEART RATE: 98 BPM | SYSTOLIC BLOOD PRESSURE: 128 MMHG

## 2024-11-19 DIAGNOSIS — J45.909 ASTHMA, UNSPECIFIED ASTHMA SEVERITY, UNSPECIFIED WHETHER COMPLICATED, UNSPECIFIED WHETHER PERSISTENT (HHS-HCC): ICD-10-CM

## 2024-11-19 DIAGNOSIS — E11.8 CONTROLLED TYPE 2 DIABETES MELLITUS WITH COMPLICATION, WITHOUT LONG-TERM CURRENT USE OF INSULIN (MULTI): Primary | ICD-10-CM

## 2024-11-19 PROCEDURE — G0157 HHC PT ASSISTANT EA 15: HCPCS | Mod: CQ,HHH

## 2024-11-19 SDOH — ECONOMIC STABILITY: GENERAL

## 2024-11-19 ASSESSMENT — ENCOUNTER SYMPTOMS
PAIN LOCATION - PAIN FREQUENCY: INTERMITTENT
PAIN: 1
PERSON REPORTING PAIN: PATIENT
PAIN: 1
LOWEST PAIN SEVERITY IN PAST 24 HOURS: 3/10
PAIN LOCATION: BACK
PAIN LOCATION: BACK
CHANGE IN APPETITE: UNCHANGED
HIGHEST PAIN SEVERITY IN PAST 24 HOURS: 5/10
PERSON REPORTING PAIN: PATIENT
PAIN SEVERITY GOAL: 0/10
PAIN LOCATION - PAIN QUALITY: THROBBING
LOWEST PAIN SEVERITY IN PAST 24 HOURS: 2/10
APPETITE LEVEL: GOOD
MUSCLE WEAKNESS: 1
PAIN LOCATION - PAIN SEVERITY: 3/10

## 2024-11-19 ASSESSMENT — ACTIVITIES OF DAILY LIVING (ADL)
MONEY MANAGEMENT (EXPENSES/BILLS): INDEPENDENT
CURRENT_FUNCTION: STAND BY ASSIST
AMBULATION ASSISTANCE: STAND BY ASSIST

## 2024-11-20 DIAGNOSIS — J45.909 ASTHMA, UNSPECIFIED ASTHMA SEVERITY, UNSPECIFIED WHETHER COMPLICATED, UNSPECIFIED WHETHER PERSISTENT (HHS-HCC): ICD-10-CM

## 2024-11-20 DIAGNOSIS — E11.8 CONTROLLED TYPE 2 DIABETES MELLITUS WITH COMPLICATION, WITHOUT LONG-TERM CURRENT USE OF INSULIN (MULTI): Primary | ICD-10-CM

## 2024-11-21 ENCOUNTER — HOME CARE VISIT (OUTPATIENT)
Dept: HOME HEALTH SERVICES | Facility: HOME HEALTH | Age: 74
End: 2024-11-21
Payer: MEDICARE

## 2024-11-22 ENCOUNTER — APPOINTMENT (OUTPATIENT)
Dept: NEUROSURGERY | Facility: CLINIC | Age: 74
End: 2024-11-22
Payer: MEDICARE

## 2024-11-22 VITALS
WEIGHT: 153 LBS | TEMPERATURE: 97.1 F | BODY MASS INDEX: 24.59 KG/M2 | DIASTOLIC BLOOD PRESSURE: 68 MMHG | HEIGHT: 66 IN | SYSTOLIC BLOOD PRESSURE: 132 MMHG

## 2024-11-22 DIAGNOSIS — M54.2 NECK PAIN: ICD-10-CM

## 2024-11-22 DIAGNOSIS — Z98.890 S/P SPINAL SURGERY: ICD-10-CM

## 2024-11-22 DIAGNOSIS — G89.18 ACUTE POST-OPERATIVE PAIN: ICD-10-CM

## 2024-11-22 DIAGNOSIS — Z98.1 S/P LUMBAR FUSION: Primary | ICD-10-CM

## 2024-11-22 PROCEDURE — 3078F DIAST BP <80 MM HG: CPT | Performed by: NURSE PRACTITIONER

## 2024-11-22 PROCEDURE — 1125F AMNT PAIN NOTED PAIN PRSNT: CPT | Performed by: NURSE PRACTITIONER

## 2024-11-22 PROCEDURE — 1111F DSCHRG MED/CURRENT MED MERGE: CPT | Performed by: NURSE PRACTITIONER

## 2024-11-22 PROCEDURE — 1159F MED LIST DOCD IN RCRD: CPT | Performed by: NURSE PRACTITIONER

## 2024-11-22 PROCEDURE — 3048F LDL-C <100 MG/DL: CPT | Performed by: NURSE PRACTITIONER

## 2024-11-22 PROCEDURE — 99024 POSTOP FOLLOW-UP VISIT: CPT | Performed by: NURSE PRACTITIONER

## 2024-11-22 PROCEDURE — 1036F TOBACCO NON-USER: CPT | Performed by: NURSE PRACTITIONER

## 2024-11-22 PROCEDURE — 3044F HG A1C LEVEL LT 7.0%: CPT | Performed by: NURSE PRACTITIONER

## 2024-11-22 PROCEDURE — 3075F SYST BP GE 130 - 139MM HG: CPT | Performed by: NURSE PRACTITIONER

## 2024-11-22 PROCEDURE — 3008F BODY MASS INDEX DOCD: CPT | Performed by: NURSE PRACTITIONER

## 2024-11-22 RX ORDER — CYCLOBENZAPRINE HCL 10 MG
10 TABLET ORAL 3 TIMES DAILY PRN
Qty: 30 TABLET | Refills: 0 | Status: SHIPPED | OUTPATIENT
Start: 2024-11-22 | End: 2024-12-02

## 2024-11-22 RX ORDER — ACETAMINOPHEN 500 MG
1000 TABLET ORAL EVERY 6 HOURS PRN
Qty: 30 TABLET | Refills: 0 | Status: SHIPPED | OUTPATIENT
Start: 2024-11-22 | End: 2024-12-02

## 2024-11-22 RX ORDER — AMOXICILLIN 250 MG
2 CAPSULE ORAL 2 TIMES DAILY
Qty: 14 TABLET | Refills: 0 | Status: SHIPPED | OUTPATIENT
Start: 2024-11-22

## 2024-11-22 RX ORDER — METHYLPREDNISOLONE 4 MG/1
TABLET ORAL
Qty: 21 TABLET | Refills: 0 | Status: SHIPPED | OUTPATIENT
Start: 2024-11-22 | End: 2024-11-29

## 2024-11-22 ASSESSMENT — PAIN SCALES - GENERAL: PAINLEVEL_OUTOF10: 8

## 2024-11-22 ASSESSMENT — PATIENT HEALTH QUESTIONNAIRE - PHQ9
SUM OF ALL RESPONSES TO PHQ9 QUESTIONS 1 & 2: 0
1. LITTLE INTEREST OR PLEASURE IN DOING THINGS: NOT AT ALL
2. FEELING DOWN, DEPRESSED OR HOPELESS: NOT AT ALL

## 2024-11-22 NOTE — PROGRESS NOTES
"[facility] [place]  Department of Neurological Surgery  Post Operative Patient Visit      History of Present Illness:  Gabby Madrid is a 74 y.o.  female who presents to the spine clinic in a post operative visit.  She underwent L4-S1 extension/revision fusion, L4-L5 TLIF at [facility] by Dr. Jacoby Casillas on 11/5/24. She was discharged home with home care on 11/11/24.    Since surgery, she is increasing her activity as tolerated. She has been doing in home PT. Reports she is decreasing her usage of pain medications and is mostly taking Tylenol at this point. She does report now posterior and lateral radicular leg pain on the right since surgery. Denies paresthesias. She also mentions that she has been having more difficulty with her hands and her hands going \"limp\" as she is trying to hold her cup of coffee. She is using a wheeled walker to assist in ambulation.       On exam:  A&O x 3, speech clear / fluent  Respirations even / unlabored  MCDANIEL without focal deficit  SURGICAL INCISION is: well approximated with exofin , no erythema / swelling / drainage  Gait is steady with wheeled walker    Gabby Madrid is progressing well post operatively. She agrees to follow up with Dr. Casillas as previously scheduled, 1/20/25, with lumbar spine x-rays prior to visit. Orders placed today for follow up lumbar X-rays, cervical X-rays, as well as a referral to physical therapy. Refill provided for cyclobenzaprine. Will trial on a steroid dosepak for RLE pain.     The above clinical summary has been dictated with voice recognition software. It has not been proofread for grammatical errors, typographical mistakes, or other semantic inconsistencies.     Thank you for visiting our office today. It was our pleasure to take part in your healthcare.      Do not hesitate to call with any questions regarding your plan of care after leaving at [phone] M-F 8am-4pm.      To clinicians, thank you very much for this kind " referral. It is a privilege to partner with you in the care of your patients. My office would be delighted to assist you with any further consultations or with questions regarding the plan of care outlined. Do not hesitate to call the office or contact me directly.         Sincerely,          Eloisa CASILLAS[address] [address]     Phone: [phone]  Fax: [phone]

## 2024-11-25 ENCOUNTER — HOME CARE VISIT (OUTPATIENT)
Dept: HOME HEALTH SERVICES | Facility: HOME HEALTH | Age: 74
End: 2024-11-25
Payer: MEDICARE

## 2024-11-25 PROCEDURE — G0157 HHC PT ASSISTANT EA 15: HCPCS | Mod: CQ,HHH

## 2024-11-25 ASSESSMENT — ENCOUNTER SYMPTOMS
HIGHEST PAIN SEVERITY IN PAST 24 HOURS: 7/10
PAIN: 1
LOWEST PAIN SEVERITY IN PAST 24 HOURS: 3/10
PAIN LOCATION: BACK
PERSON REPORTING PAIN: PATIENT

## 2024-11-27 ENCOUNTER — HOME CARE VISIT (OUTPATIENT)
Dept: HOME HEALTH SERVICES | Facility: HOME HEALTH | Age: 74
End: 2024-11-27
Payer: MEDICARE

## 2024-12-02 ENCOUNTER — HOME CARE VISIT (OUTPATIENT)
Dept: HOME HEALTH SERVICES | Facility: HOME HEALTH | Age: 74
End: 2024-12-02
Payer: MEDICARE

## 2024-12-03 ENCOUNTER — HOME CARE VISIT (OUTPATIENT)
Dept: HOME HEALTH SERVICES | Facility: HOME HEALTH | Age: 74
End: 2024-12-03
Payer: MEDICARE

## 2024-12-03 VITALS
RESPIRATION RATE: 18 BRPM | DIASTOLIC BLOOD PRESSURE: 64 MMHG | HEART RATE: 87 BPM | OXYGEN SATURATION: 99 % | SYSTOLIC BLOOD PRESSURE: 122 MMHG | TEMPERATURE: 96.9 F

## 2024-12-03 PROCEDURE — G0299 HHS/HOSPICE OF RN EA 15 MIN: HCPCS | Mod: HHH

## 2024-12-03 SDOH — ECONOMIC STABILITY: GENERAL

## 2024-12-03 ASSESSMENT — ENCOUNTER SYMPTOMS
PERSON REPORTING PAIN: PATIENT
PAIN LOCATION: BACK
PAIN LOCATION - PAIN FREQUENCY: INTERMITTENT
PAIN LOCATION - PAIN SEVERITY: 5/10
HIGHEST PAIN SEVERITY IN PAST 24 HOURS: 8/10
PAIN LOCATION - PAIN QUALITY: ACHING
APPETITE LEVEL: FAIR
PAIN LOCATION - PAIN SEVERITY: 8/10
PAIN LOCATION - PAIN FREQUENCY: INTERMITTENT
PAIN SEVERITY GOAL: 0/10
PAIN LOCATION: LEFT HIP
PAIN: 1
MUSCLE WEAKNESS: 1
LOWEST PAIN SEVERITY IN PAST 24 HOURS: 5/10
CHANGE IN APPETITE: DECREASED
PAIN LOCATION - PAIN QUALITY: ACHING
LIMITED RANGE OF MOTION: 1

## 2024-12-03 ASSESSMENT — ACTIVITIES OF DAILY LIVING (ADL)
AMBULATION ASSISTANCE: STAND BY ASSIST
CURRENT_FUNCTION: STAND BY ASSIST
MONEY MANAGEMENT (EXPENSES/BILLS): INDEPENDENT

## 2024-12-04 ENCOUNTER — HOME CARE VISIT (OUTPATIENT)
Dept: HOME HEALTH SERVICES | Facility: HOME HEALTH | Age: 74
End: 2024-12-04
Payer: MEDICARE

## 2024-12-04 DIAGNOSIS — E78.00 HYPERCHOLESTEROLEMIA: ICD-10-CM

## 2024-12-04 PROCEDURE — G0157 HHC PT ASSISTANT EA 15: HCPCS | Mod: CQ,HHH

## 2024-12-04 ASSESSMENT — ENCOUNTER SYMPTOMS
PERSON REPORTING PAIN: PATIENT
HIGHEST PAIN SEVERITY IN PAST 24 HOURS: 3/10
SUBJECTIVE PAIN PROGRESSION: RAPIDLY IMPROVING
PAIN: 1
PAIN LOCATION: BACK

## 2024-12-05 RX ORDER — ATORVASTATIN CALCIUM 20 MG/1
TABLET, FILM COATED ORAL
Qty: 45 TABLET | Refills: 3 | Status: SHIPPED | OUTPATIENT
Start: 2024-12-05

## 2024-12-09 ENCOUNTER — HOME CARE VISIT (OUTPATIENT)
Dept: HOME HEALTH SERVICES | Facility: HOME HEALTH | Age: 74
End: 2024-12-09
Payer: MEDICARE

## 2024-12-09 PROCEDURE — G0151 HHCP-SERV OF PT,EA 15 MIN: HCPCS | Mod: HHH

## 2024-12-09 SDOH — HEALTH STABILITY: PHYSICAL HEALTH: EXERCISE TYPE: SITTING , STANDING EXS X 15 REPS

## 2024-12-09 ASSESSMENT — ENCOUNTER SYMPTOMS
LOWEST PAIN SEVERITY IN PAST 24 HOURS: 2/10
PERSON REPORTING PAIN: PATIENT
HIGHEST PAIN SEVERITY IN PAST 24 HOURS: 8/10
PAIN: 1
OCCASIONAL FEELINGS OF UNSTEADINESS: 0
PAIN LOCATION: BACK

## 2024-12-09 ASSESSMENT — ACTIVITIES OF DAILY LIVING (ADL): AMBULATION ASSISTANCE ON FLAT SURFACES: 1

## 2024-12-09 NOTE — HOME HEALTH
patient seen for pt dc today .  she states she is feeling better and plans to call to make an appt for out patient rehab.  she agrees with today .

## 2024-12-11 ENCOUNTER — HOME CARE VISIT (OUTPATIENT)
Dept: HOME HEALTH SERVICES | Facility: HOME HEALTH | Age: 74
End: 2024-12-11
Payer: MEDICARE

## 2024-12-11 VITALS
RESPIRATION RATE: 18 BRPM | HEART RATE: 100 BPM | DIASTOLIC BLOOD PRESSURE: 70 MMHG | SYSTOLIC BLOOD PRESSURE: 120 MMHG | TEMPERATURE: 96.9 F | OXYGEN SATURATION: 97 %

## 2024-12-11 PROCEDURE — G0299 HHS/HOSPICE OF RN EA 15 MIN: HCPCS | Mod: HHH

## 2024-12-11 ASSESSMENT — ACTIVITIES OF DAILY LIVING (ADL)
HOME_HEALTH_OASIS: 01
OASIS_M1830: 03

## 2024-12-11 ASSESSMENT — ENCOUNTER SYMPTOMS
PERSON REPORTING PAIN: PATIENT
DENIES PAIN: 1

## 2024-12-12 DIAGNOSIS — I10 ESSENTIAL HYPERTENSION: ICD-10-CM

## 2024-12-12 RX ORDER — AMLODIPINE BESYLATE 5 MG/1
5 TABLET ORAL DAILY
Qty: 90 TABLET | Refills: 3 | Status: SHIPPED | OUTPATIENT
Start: 2024-12-12

## 2024-12-31 ENCOUNTER — APPOINTMENT (OUTPATIENT)
Dept: UROLOGY | Facility: HOSPITAL | Age: 74
End: 2024-12-31
Payer: MEDICARE

## 2025-01-06 DIAGNOSIS — Z86.39 HISTORY OF LOW POTASSIUM: ICD-10-CM

## 2025-01-06 RX ORDER — POTASSIUM CHLORIDE 20 MEQ/1
20 TABLET, EXTENDED RELEASE ORAL DAILY
Qty: 30 TABLET | Refills: 3 | Status: SHIPPED | OUTPATIENT
Start: 2025-01-06 | End: 2025-05-06

## 2025-01-09 DIAGNOSIS — R53.83 OTHER FATIGUE: ICD-10-CM

## 2025-01-15 ENCOUNTER — HOSPITAL ENCOUNTER (OUTPATIENT)
Dept: RADIOLOGY | Facility: CLINIC | Age: 75
Discharge: HOME | End: 2025-01-15
Payer: MEDICARE

## 2025-01-15 ENCOUNTER — EVALUATION (OUTPATIENT)
Dept: PHYSICAL THERAPY | Facility: CLINIC | Age: 75
End: 2025-01-15
Payer: MEDICARE

## 2025-01-15 DIAGNOSIS — Z98.1 S/P LUMBAR FUSION: ICD-10-CM

## 2025-01-15 DIAGNOSIS — M54.16 LUMBAR RADICULOPATHY: ICD-10-CM

## 2025-01-15 DIAGNOSIS — M54.2 NECK PAIN: ICD-10-CM

## 2025-01-15 PROCEDURE — 72100 X-RAY EXAM L-S SPINE 2/3 VWS: CPT

## 2025-01-15 PROCEDURE — 97110 THERAPEUTIC EXERCISES: CPT | Mod: GP | Performed by: PHYSICAL THERAPIST

## 2025-01-15 PROCEDURE — 97161 PT EVAL LOW COMPLEX 20 MIN: CPT | Mod: GP | Performed by: PHYSICAL THERAPIST

## 2025-01-15 PROCEDURE — 72120 X-RAY BEND ONLY L-S SPINE: CPT | Performed by: RADIOLOGY

## 2025-01-15 PROCEDURE — 72050 X-RAY EXAM NECK SPINE 4/5VWS: CPT

## 2025-01-15 PROCEDURE — 72050 X-RAY EXAM NECK SPINE 4/5VWS: CPT | Performed by: RADIOLOGY

## 2025-01-15 PROCEDURE — 71046 X-RAY EXAM CHEST 2 VIEWS: CPT

## 2025-01-15 ASSESSMENT — ENCOUNTER SYMPTOMS
DEPRESSION: 0
OCCASIONAL FEELINGS OF UNSTEADINESS: 0
LOSS OF SENSATION IN FEET: 0

## 2025-01-15 NOTE — LETTER
January 15, 2025    Kimmy Vargas, PT  [address] [facility]  Rehabilitation Services, [facility] [address]  [facility] [address]    Patient: Gabby Madrid   YOB: 1950   Date of Visit: 1/15/2025       Dear Eloisa E Calvillo, APRN-CNP  [address]  [place] [address]    The attached plan of care is being sent to you because your patient’s medical reimbursement requires that you certify the plan of care. Your signature is required to allow uninterrupted insurance coverage.      You may indicate your approval by signing below and faxing this form back to us at Dept Fax: [phone].    Please call Dept: [phone] with any questions or concerns.    Thank you for this referral,        Kimmy Vargas, PT  [address] [facility]  [address] [facility] [address]    Payer: Payor: MEDICARE / Plan: MEDICARE PART A AND B / Product Type: *No Product type* /                                                                         Date:     Dear Kimmy Vargas, PT,     Re: Ms. Gabby Madrid, MRN:[id number]    I certify that I have reviewed the attached plan of care and it is medically necessary for Ms. Gabby Madrid (1950) who is under my care.          ______________________________________                    _________________  Provider name and credentials                                           Date and time                                                                                           Plan of Care 1/15/25   Effective from: 1/15/2025  Effective to: 3/10/2025    Plan ID: [id number]            Participants as of Finalize on 1/15/2025    Name Type Comments Contact Info    EFRAIN Mello Referring Provider  [phone]    Kimmy Vargas PT Physical Therapist  [phone]       Last Plan Note     Author: Kimmy Vargas PT Status: Incomplete Last edited: 1/15/2025 11:45 AM       PHYSICAL THERAPY   EVALUATION & TREATMENT  NOTE    Patient Name:  Gabby Madrid   Patient MRN: [id number]  Date: 1/15/2025    Time Calculation  Start Time: 1151  Stop Time: 1227  Time Calculation (min): 36 min    Insurance:  Insurance Type: Medicare  Visit number: 1  Approved # of visits [place]  Authorization Needed: No  Cert Date: 1/15/25-3/10/25    General   Reason for visit: LBP s/p L4-S1 extension/revision fusion, L4/L5 TLIF   DOS 11/5/24   Referred by: Jacoby Casillas    Therapy diagnoses:   Problem List Items Addressed This Visit             ICD-10-CM    S/P lumbar fusion Z98.1    Relevant Orders    Follow Up In Physical Therapy       ASSESSMENT   75 y/o female s/p L4-S1 extension/revision fusion, L4/L5 TLIF on 11/5/24 presenting to PT today with back pain radiating down B LE, decreased lumbar/spinal mobility, decreased core and LE strength, decreased B LE flexibility, decreased SL stability/balance affecting her ability to perform her normal ADLS including housework, walking, and other functional activities. Patient will benefit from skilled therapy to address these impairments and return to prior level of functioning.     PLAN   Goals  1. Pt will be independent in HEP in 6-8 weeks  2. Pt will report 0-4/10 low back pain at rest and with activity in 6-8 weeks.  3. Pt will demonstrate 5/5 B LE strength to return to heavy household activities in 6-8 weeks  4. Pt will demonstrate full lumbar ROM to improve bed mobility in 6-8 weeks.  5. Pt will report MAG score < 12% in 6-8 weeks.    Plan of care to include: therapeutic exercise, therapeutic activity, soft tissue mobilization, joint mobilizations, neuromuscular re-education, pt education, self care activities, home program, gait training    1-2x/week for 6-8 weeks.    Patient agrees to plan of care.    SUBJECTIVE   Reviewed medical history form with patient and medical screening assessed     Had lumbar fusion in 2000 but never got relief from pain. Has had LBP her whole life with pregnancies and such. Then  "had R THR 2021 which helped her hip but not her back. Has had multiple injections which did help, last one was July/August. Also has sciatica pain on B LES - R side started after surgery. Pt is s/p L4-S1 extension/revision fusion, L4/L5 TLIF on 11/5/24. Is going back to the doctor 1/20/25. Is taking pain meds since surgery as needed. Did have some home PT after surgery and is continuing to do her HEP. Is no longer using an assistive device to walk.     Pain:  LBP primarily referring down B LES, R LE down to knee pain. L LE goes down to top of foot. Denies N/T or giving out.   At worst, pain is 12/10, consistently is a 7/10  Exacerbating factors include standing > 15-20'  At best, pain is 4/10  Relieving factors include heat    Function:  Trying to avoid bending, has not been back to heavy household activities like vacuuming, avoiding lifting more than gallon of milk (is not doing laundry)  Sleep - uninterrupted, sleeping on her back with pillow under her knees, takes a Trazadone to sleep  Lives with     Work: Retired    Social: likes to [employer], [employer], gardening  Exercise - wants to get back to walking     Pt goals: decrease pain, get back to crafting, wants to dance at her granddaughter's wedding in June    Language: English  Potential barriers to treatment: continue to assess    Precautions:    PMH: HTN, PVD, DM II, hep C, depression with anxiety, R THR 2021, L TKR 2009, lumbar fusion 2000  Fall risk -  low      OBJECTIVE *=painful  Gait Ambulates IND, decreased josé luis    Observation/Posture  Sit to stand - very stiff, slow and painful  Needs assist for supine to sit    Balance  SLS R 5\", L 9\"    Palpation  (+) TTP throughout L1-L5    Sensation  Decreased to light touch R lower leg    Range of Motion (R, L in degrees)  Lumbar Flexion fingers to patella*  Lumbar Extension max dec  Lumbar Sidebending mod dec, max dec*  Lumbar Rotation max dec*, max dec*    Flexibility (R, L)  Hamstrings mod-max " dec  Quads    Strength (R, L MMT out of 5)  Hip Flexion 4-*, 4  Knee Flexion 5, 5  Knee Extension 4-*, 5  Ankle Dorsiflexion 4, 5    Outcome Measures  MAG = 50%    Today's treatment and initial evaluation included:  - Patient education regarding diagnosis, prognosis, contributing factors, comorbidities, activity modification, symptom monitoring, importance of HEP, role of PT, postural re-education, body mechanics, reviewed office cancel/no show policy.  - Review of POC   - Therapeutic Exercise & given HEP handout:  Access Code: [email or web address]  - Supine Lower Trunk Rotation  - 2 x daily - 10 reps - 5 sec hold  - Hooklying Single Knee to Chest Stretch  - 2 x daily - 3 reps - 30 sec hold  - Supine Posterior Pelvic Tilt  - 2 x daily - 15 reps - 5 sec hold  - Supine Bridge  - 2 x daily - 10 reps - 3 sec hold  - Standing hip flexor stretch  - 2 x daily - 3 reps - 30 sec hold  - Seated Hamstring Stretch  - 2 x daily - 3 reps - 30 sec hold    PT Evaluation Time Entry  PT Evaluation (Low) Time Entry: 26  PT Therapeutic Procedures Time Entry  Therapeutic Exercise Time Entry: 10                         Current Participants as of 1/15/2025    Name Type Comments Contact Info    VINNY Mello-CNP Referring Provider  [phone]    Signature pending    Kimmy Vargas, PT Physical Therapist  [phone]    Electronically signed by Kimmy Vargas PT at 1/15/2025 1231 EST

## 2025-01-15 NOTE — PROGRESS NOTES
PHYSICAL THERAPY   EVALUATION & TREATMENT NOTE    Patient Name:  Gabby Madrid   Patient MRN: [id number]  Date: 1/15/2025    Time Calculation  Start Time: 1151  Stop Time: 1227  Time Calculation (min): 36 min    Insurance:  Insurance Type: Medicare  Visit number: 1  Approved # of visits [place]  Authorization Needed: No  Cert Date: 1/15/25-3/10/25    General   Reason for visit: LBP s/p L4-S1 extension/revision fusion, L4/L5 TLIF   DOS 11/5/24   Referred by: Jacoby Casillas/Eloisa Calvillo    Therapy diagnoses:   Problem List Items Addressed This Visit             ICD-10-CM    S/P lumbar fusion Z98.1    Relevant Orders    Follow Up In Physical Therapy       ASSESSMENT   73 y/o female s/p L4-S1 extension/revision fusion, L4/L5 TLIF on 11/5/24 presenting to PT today with back pain radiating down B LE, decreased lumbar/spinal mobility, decreased core and LE strength, decreased B LE flexibility, decreased SL stability/balance affecting her ability to perform her normal ADLS including housework, walking, and other functional activities. Patient will benefit from skilled therapy to address these impairments and return to prior level of functioning.     PLAN   Goals  1. Pt will be independent in HEP in 6-8 weeks  2. Pt will report 0-4/10 low back pain at rest and with activity in 6-8 weeks.  3. Pt will demonstrate 5/5 B LE strength to return to heavy household activities in 6-8 weeks  4. Pt will demonstrate full lumbar ROM to improve bed mobility in 6-8 weeks.  5. Pt will report MAG score < 12% in 6-8 weeks.    Plan of care to include: therapeutic exercise, therapeutic activity, soft tissue mobilization, joint mobilizations, neuromuscular re-education, pt education, self care activities, home program, gait training    1-2x/week for 6-8 weeks.    Patient agrees to plan of care.    SUBJECTIVE   Reviewed medical history form with patient and medical screening assessed     Had lumbar fusion in 2000 but never got relief from pain. Has  "had LBP her whole life with pregnancies and such. Then had R THR 2021 which helped her hip but not her back. Has had multiple injections which did help, last one was July/August. Also has sciatica pain on B LES - R side started after surgery. Pt is s/p L4-S1 extension/revision fusion, L4/L5 TLIF on 11/5/24. Is going back to the doctor 1/20/25. Is taking pain meds since surgery as needed. Did have some home PT after surgery and is continuing to do her HEP. Is no longer using an assistive device to walk.     Pain:  LBP primarily referring down B LES, R LE down to knee pain. L LE goes down to top of foot. Denies N/T or giving out.   At worst, pain is 12/10, consistently is a 7/10  Exacerbating factors include standing > 15-20'  At best, pain is 4/10  Relieving factors include heat    Function:  Trying to avoid bending, has not been back to heavy household activities like vacuuming, avoiding lifting more than gallon of milk (is not doing laundry)  Sleep - uninterrupted, sleeping on her back with pillow under her knees, takes a Trazadone to sleep  Lives with     Work: Retired    Social: likes to crafting, [employer], gardening  Exercise - wants to get back to walking     Pt goals: decrease pain, get back to crafting, wants to dance at her granddaughter's wedding in June    Language: English  Potential barriers to treatment: continue to assess    Precautions:    PMH: HTN, PVD, DM II, hep C, depression with anxiety, R THR 2021, L TKR 2009, lumbar fusion 2000  Fall risk -  low      OBJECTIVE *=painful  Gait Ambulates IND, decreased josé luis    Observation/Posture  Sit to stand - very stiff, slow and painful  Needs assist for supine to sit    Balance  SLS R 5\", L 9\"    Palpation  (+) TTP throughout L1-L5    Sensation  Decreased to light touch R lower leg    Range of Motion (R, L in degrees)  Lumbar Flexion fingers to patella*  Lumbar Extension max dec  Lumbar Sidebending mod dec, max dec*  Lumbar Rotation max dec*, max " dec*    Flexibility (R, L)  Hamstrings mod-max dec  Quads    Strength (R, L MMT out of 5)  Hip Flexion 4-*, 4  Knee Flexion 5, 5  Knee Extension 4-*, 5  Ankle Dorsiflexion 4, 5    Outcome Measures  MAG = 50%    Today's treatment and initial evaluation included:  - Patient education regarding diagnosis, prognosis, contributing factors, comorbidities, activity modification, symptom monitoring, importance of HEP, role of PT, postural re-education, body mechanics, reviewed office cancel/no show policy.  - Review of POC   - Therapeutic Exercise & given HEP handout:  Access Code: [email or web address]  - Supine Lower Trunk Rotation  - 2 x daily - 10 reps - 5 sec hold  - Hooklying Single Knee to Chest Stretch  - 2 x daily - 3 reps - 30 sec hold  - Supine Posterior Pelvic Tilt  - 2 x daily - 15 reps - 5 sec hold  - Supine Bridge  - 2 x daily - 10 reps - 3 sec hold  - Standing hip flexor stretch  - 2 x daily - 3 reps - 30 sec hold  - Seated Hamstring Stretch  - 2 x daily - 3 reps - 30 sec hold    PT Evaluation Time Entry  PT Evaluation (Low) Time Entry: 26  PT Therapeutic Procedures Time Entry  Therapeutic Exercise Time Entry: 10                   Mood disorder of depressed type   F32.9

## 2025-01-20 ENCOUNTER — OFFICE VISIT (OUTPATIENT)
Facility: CLINIC | Age: 75
End: 2025-01-20
Payer: MEDICARE

## 2025-01-20 VITALS
BODY MASS INDEX: 25.39 KG/M2 | HEIGHT: 66 IN | HEART RATE: 79 BPM | SYSTOLIC BLOOD PRESSURE: 138 MMHG | TEMPERATURE: 97.7 F | DIASTOLIC BLOOD PRESSURE: 78 MMHG | WEIGHT: 158 LBS

## 2025-01-20 DIAGNOSIS — M54.16 LUMBAR RADICULOPATHY: Primary | ICD-10-CM

## 2025-01-20 PROCEDURE — 99211 OFF/OP EST MAY X REQ PHY/QHP: CPT | Performed by: STUDENT IN AN ORGANIZED HEALTH CARE EDUCATION/TRAINING PROGRAM

## 2025-01-20 PROCEDURE — 1125F AMNT PAIN NOTED PAIN PRSNT: CPT | Performed by: STUDENT IN AN ORGANIZED HEALTH CARE EDUCATION/TRAINING PROGRAM

## 2025-01-20 PROCEDURE — 3078F DIAST BP <80 MM HG: CPT | Performed by: STUDENT IN AN ORGANIZED HEALTH CARE EDUCATION/TRAINING PROGRAM

## 2025-01-20 PROCEDURE — 1159F MED LIST DOCD IN RCRD: CPT | Performed by: STUDENT IN AN ORGANIZED HEALTH CARE EDUCATION/TRAINING PROGRAM

## 2025-01-20 PROCEDURE — 3008F BODY MASS INDEX DOCD: CPT | Performed by: STUDENT IN AN ORGANIZED HEALTH CARE EDUCATION/TRAINING PROGRAM

## 2025-01-20 PROCEDURE — 3075F SYST BP GE 130 - 139MM HG: CPT | Performed by: STUDENT IN AN ORGANIZED HEALTH CARE EDUCATION/TRAINING PROGRAM

## 2025-01-20 PROCEDURE — 1036F TOBACCO NON-USER: CPT | Performed by: STUDENT IN AN ORGANIZED HEALTH CARE EDUCATION/TRAINING PROGRAM

## 2025-01-20 ASSESSMENT — PAIN SCALES - GENERAL: PAINLEVEL_OUTOF10: 5

## 2025-01-20 NOTE — PROGRESS NOTES
[facility] [place]  Department of Neurological Surgery  Post Operative Patient Visit      History of Present Illness:  Gabby Madrid is a 74 y.o. year old female who presents to the spine clinic in a post operative visit. Since surgery they are 2-3 months s/p L4-S1 extension/revision fusion, L4-L5 TLIF at [facility] by Dr. Jacoby Casillas on 11/5/24. She is doing better. She has some tightness and soreness in the thoracic region. Her mechanical back pain is improving and she has intermittent sciatica in the legs. XR show hardware in good position. I will see her back in 3-4 months after she sees pain management. She would like another back injection and I cleared her to do so.       The above clinical summary has been dictated with voice recognition software. It has not been proofread for grammatical errors, typographical mistakes, or other semantic inconsistencies.    Thank you for visiting our office today. It was our pleasure to take part in your healthcare.     Do not hesitate to call with any questions regarding your plan of care after leaving at [phone] M-F 8am-4pm.     To clinicians, thank you very much for this kind referral. It is a privilege to partner with you in the care of your patients. My office would be delighted to assist you with any further consultations or with questions regarding the plan of care outlined. Do not hesitate to call the office or contact me directly.       Sincerely,      Jacoby Casillas MD, [facility]  Spine , [facility]  Grey Marsh Chair in Spinal Neurosurgery  Complex Spine Surgery Fellowship Director   of Neurological Surgery  [employer]  Phone: [phone]  Fax: [phone]        Scribe Attestation  By signing my name below, I, Ana Rios , Amparo   attest that this documentation has been prepared under the direction and in the  presence of Jacoby Casillas MD.

## 2025-01-22 ENCOUNTER — DOCUMENTATION (OUTPATIENT)
Dept: PHYSICAL THERAPY | Facility: CLINIC | Age: 75
End: 2025-01-22
Payer: MEDICARE

## 2025-01-22 ENCOUNTER — APPOINTMENT (OUTPATIENT)
Dept: PHYSICAL THERAPY | Facility: CLINIC | Age: 75
End: 2025-01-22
Payer: MEDICARE

## 2025-01-22 NOTE — PROGRESS NOTES
Physical Therapy                 Therapy Communication Note    Patient Name: Gabby Madrid  MRN: [id number]  Department:   Room: Room/bed info not found  Today's Date: 1/22/2025     Discipline: Physical Therapy          Missed Visit Reason:  Lack of transportation    Missed Time: Cancel    Comment:

## 2025-02-05 ENCOUNTER — APPOINTMENT (OUTPATIENT)
Dept: PHYSICAL THERAPY | Facility: CLINIC | Age: 75
End: 2025-02-05
Payer: MEDICARE

## 2025-03-18 ENCOUNTER — DOCUMENTATION (OUTPATIENT)
Dept: PHYSICAL THERAPY | Facility: CLINIC | Age: 75
End: 2025-03-18
Payer: MEDICARE

## 2025-03-18 NOTE — PROGRESS NOTES
Physical Therapy    Discharge Summary    Name: Gabby Madrid  MRN: [id number]  : 1950  Date: 25    Discharge Summary: PT    Discharge Information:   Date of discharge 25  Date of last visit 1/15/25   Date of evaluation 1/15/25  Number of attended visits 1  Referred by Jacoby Casillas/Eloisa Calvillo  Referred for LBP s/p L4-S1 extension/revision fusion, L4/L5 TLIF 24    Rehab Discharge Reason:  Only attended initial eval, lost to follow up

## 2025-03-25 DIAGNOSIS — N20.0 KIDNEY STONE: ICD-10-CM

## 2025-03-25 RX ORDER — HYDROCHLOROTHIAZIDE 50 MG/1
50 TABLET ORAL DAILY
Qty: 90 TABLET | Refills: 3 | Status: SHIPPED | OUTPATIENT
Start: 2025-03-25 | End: 2026-03-25

## 2025-04-14 ENCOUNTER — HOSPITAL ENCOUNTER (OUTPATIENT)
Dept: RADIOLOGY | Facility: HOSPITAL | Age: 75
Discharge: HOME | End: 2025-04-14
Payer: MEDICARE

## 2025-04-14 DIAGNOSIS — N28.89 RIGHT KIDNEY MASS: ICD-10-CM

## 2025-04-14 PROCEDURE — 74183 MRI ABD W/O CNTR FLWD CNTR: CPT

## 2025-04-14 PROCEDURE — 2550000001 HC RX 255 CONTRASTS: Performed by: STUDENT IN AN ORGANIZED HEALTH CARE EDUCATION/TRAINING PROGRAM

## 2025-04-14 PROCEDURE — A9575 INJ GADOTERATE MEGLUMI 0.1ML: HCPCS | Performed by: STUDENT IN AN ORGANIZED HEALTH CARE EDUCATION/TRAINING PROGRAM

## 2025-04-14 RX ORDER — GADOTERATE MEGLUMINE 376.9 MG/ML
15 INJECTION INTRAVENOUS
Status: COMPLETED | OUTPATIENT
Start: 2025-04-14 | End: 2025-04-14

## 2025-04-14 RX ADMIN — GADOTERATE MEGLUMINE 13 ML: 376.9 INJECTION INTRAVENOUS at 11:05

## 2025-04-15 ENCOUNTER — OFFICE VISIT (OUTPATIENT)
Facility: CLINIC | Age: 75
End: 2025-04-15
Payer: MEDICARE

## 2025-04-15 VITALS
TEMPERATURE: 98.1 F | HEIGHT: 66 IN | DIASTOLIC BLOOD PRESSURE: 84 MMHG | BODY MASS INDEX: 23.63 KG/M2 | SYSTOLIC BLOOD PRESSURE: 132 MMHG | WEIGHT: 147 LBS | RESPIRATION RATE: 16 BRPM | HEART RATE: 72 BPM

## 2025-04-15 DIAGNOSIS — M54.12 CERVICAL RADICULOPATHY: Primary | ICD-10-CM

## 2025-04-15 DIAGNOSIS — M54.9 MID BACK PAIN: ICD-10-CM

## 2025-04-15 PROCEDURE — 3075F SYST BP GE 130 - 139MM HG: CPT | Performed by: NURSE PRACTITIONER

## 2025-04-15 PROCEDURE — 1125F AMNT PAIN NOTED PAIN PRSNT: CPT | Performed by: NURSE PRACTITIONER

## 2025-04-15 PROCEDURE — 99214 OFFICE O/P EST MOD 30 MIN: CPT | Performed by: NURSE PRACTITIONER

## 2025-04-15 PROCEDURE — 3079F DIAST BP 80-89 MM HG: CPT | Performed by: NURSE PRACTITIONER

## 2025-04-15 PROCEDURE — 3008F BODY MASS INDEX DOCD: CPT | Performed by: NURSE PRACTITIONER

## 2025-04-15 PROCEDURE — 1159F MED LIST DOCD IN RCRD: CPT | Performed by: NURSE PRACTITIONER

## 2025-04-15 PROCEDURE — 1036F TOBACCO NON-USER: CPT | Performed by: NURSE PRACTITIONER

## 2025-04-15 RX ORDER — PREGABALIN 75 MG/1
75 CAPSULE ORAL EVERY 12 HOURS
Qty: 60 CAPSULE | Refills: 0 | Status: SHIPPED | OUTPATIENT
Start: 2025-04-15 | End: 2025-05-15

## 2025-04-15 RX ORDER — OXYCODONE AND ACETAMINOPHEN 5; 325 MG/1; MG/1
1 TABLET ORAL EVERY 6 HOURS
COMMUNITY
Start: 2025-04-03

## 2025-04-15 ASSESSMENT — PATIENT HEALTH QUESTIONNAIRE - PHQ9
1. LITTLE INTEREST OR PLEASURE IN DOING THINGS: NOT AT ALL
2. FEELING DOWN, DEPRESSED OR HOPELESS: NOT AT ALL
SUM OF ALL RESPONSES TO PHQ9 QUESTIONS 1 AND 2: 0

## 2025-04-15 ASSESSMENT — PAIN SCALES - GENERAL: PAINLEVEL_OUTOF10: 5

## 2025-04-15 NOTE — PROGRESS NOTES
[facility]  Department of Neurological Surgery  Established Patient Visit    History of Present Illness  Gabby Madrid is a 75 y.o.  female who presents to the spine clinic in follow up for neck and back pain . She most recently underwent L4-S1 extension/revision fusion, L4-5 TLIF with Dr. Jacoby Casillas on 11/5/24. She was seen in follow up by Dr. Casillas on 1/20/25 and at that time reported tightness and soreness in the thoracic region as well as intermittent sciatica in both legs. X-rays showed hardware in good position.   Today she reports overall improvement in her leg pain and denies any leg paresthesias. Still with back pain that is worse with weather changes. Pain is worse in cold weather and improved with warm weather. Today aGbby reports upper back and neck pain. She has radicular pain in the mostly in the right posterior forearm and into the pinky finger on the right hand, however she occasionally has pain into the left hand in the same distribution as well. She denies upper extremity paresthesias. She has been doing daily stretches, as well as using heat and pain medication as needed with some relief. She underwent a recent cervical medial branch block with Dr. Pichardo on 3/26/25 and reports some relief with this procedure.         ROS x 10 is, otherwise, negative unless documented in HPI above    Patient Active Problem List   Diagnosis    Abnormal mammography    Anemia    Atypical chest pain    Chronic back pain    Chronic pain syndrome    Low back pain    Chronic constipation    Degenerative spondylolisthesis    Depression with anxiety    Diabetes mellitus type 2, controlled (Multi)    Essential hypertension    Fatigue    Generalized osteoarthritis of multiple sites    GERD without esophagitis    Headache, chronic daily    Hematuria    History of breast cancer    Hypercholesterolemia    Impingement syndrome of left shoulder    Impingement syndrome of right shoulder    Insomnia     Joint pain    Cervical radiculopathy at C6    Knee pain, right    Knee pain, left    Left cervical radiculopathy    Left serous otitis media    Limb pain    Liver mass    Lumbar radiculitis    Lumbar stenosis    Multiple insect bites    Myofascial pain syndrome    Normal pressure hydrocephalus (Multi)    Oral thrush    Postlaminectomy syndrome, lumbar    Primary localized osteoarthritis of right hip    Primary osteoarthritis of right knee    Renal cancer (Multi)    Right hip pain    Right kidney mass    Renal calculus    Sacroiliac joint pain    SOB (shortness of breath) on exertion    Bilateral shoulder pain    Sore throat    Status post left knee replacement    Tinea cruris    TMJ (sprain of temporomandibular joint)    Trochanteric bursitis    Vertebral artery occlusion, left    Vitreous floaters of left eye    Dizziness    Deficiency of vitamin B12    Bladder outlet obstruction    Acute bronchitis    Acute recurrent pansinusitis    Herpangina    Shoulder pain    Chronic right hip pain    Abdominal mass    Kidney stone    Gastrointestinal hemorrhage with melena    Decreased libido    Epigastric pain    H. pylori infection    Herniated disc, cervical    Lumbosacral spondylosis without myelopathy    Osteopenia    Primary localized osteoarthrosis, lower leg    Sacroiliitis, not elsewhere classified    Unintentional weight loss    Urolithiasis    Burning in the chest    Greater trochanteric bursitis of left hip    Myalgia    Depressive disorder    Atrophic vaginitis    Lumbar radiculopathy    Lumbar spondylosis    Radiculitis    Chronic pain disorder    Chronic low back pain    Lumbar postlaminectomy syndrome    Calculus of ureter    Diverticulosis    Hyperlipoproteinemia    Obstructive uropathy    Other specified abnormal findings of blood chemistry    Chest pain, atypical    Constipation    Calculus of right kidney    UTI (urinary tract infection)    Headache    Cervical postlaminectomy syndrome     Postlaminectomy syndrome of lumbar region    Abdominal pain    Sacroiliitis (CMS-HCC)    History of hypertension    History of type 2 diabetes mellitus    Impingement syndrome of shoulder region    Lumbar degenerative disc disease    Neuropathy due to type 2 diabetes mellitus (Multi)    Nonspecific abnormal finding in stool contents    Sciatica of left side    Chronic hepatitis C without hepatic coma (Multi)    Chronic neck pain    Peripheral vascular disease (CMS-HCC)    Asthma    Seasonal allergies    Pseudotumor cerebri    Chronic TMJ pain    Vision loss    PUD (peptic ulcer disease)    History of hysterectomy    Abdominal pregnancy without intrauterine pregnancy (HHS-HCC)    S/P lumbar fusion     Past Medical History:   Diagnosis Date    Anxiety     Depression     Hypertension     Personal history of other diseases of the circulatory system 10/28/2015    History of hypertension    Personal history of other endocrine, nutritional and metabolic disease 04/02/2021    History of type 2 diabetes mellitus    Type 2 diabetes mellitus with diabetic neuropathy, unspecified (Multi) 10/28/2015    Diabetic neuropathy associated with type 2 diabetes mellitus     Past Surgical History:   Procedure Laterality Date    APPENDECTOMY  12/22/2015    Appendectomy    CT ANGIO NECK  4/28/2017    CT NECK ANGIO W AND WO IV CONTRAST 4/28/2017 [facility]    HYSTERECTOMY  12/22/2015    Hysterectomy    LUMBAR FUSION  09/29/2016    Lumbar Vertebral Fusion    MR HEAD ANGIO WO IV CONTRAST  12/8/2020    MR HEAD ANGIO WO IV CONTRAST 12/8/2020 [facility]    OTHER SURGICAL HISTORY  08/30/2016    Spinal Diskectomy Lumbar    OTHER SURGICAL HISTORY  05/19/2022    Hip replacement    TONSILLECTOMY  12/22/2015    Tonsillectomy    TOTAL KNEE ARTHROPLASTY  08/30/2016    Knee Replacement     Social History     Tobacco Use    Smoking status: Never    Smokeless tobacco: Never   Substance Use Topics    Alcohol use: Never     family history  includes Cardiac Disorder in her father, half-brother, and maternal grandfather; Dementia in her mother; Heart attack (age of onset: 43) in her half-brother; Heart attack (age of onset: 63) in her father; PPM in her maternal grandfather.    Current Outpatient Medications:     alpha tocopherol (Vitamin E) 670 mg (1,000 unit) capsule, Take 1 capsule (1,000 Units) by mouth once daily., Disp: , Rfl:     amLODIPine (Norvasc) 5 mg tablet, TAKE 1 TABLET EVERY DAY, Disp: 90 tablet, Rfl: 3    ascorbic acid, vitamin C, 1,000 mg ER tablet, Take 1 tablet (1,000 mg) by mouth once daily., Disp: , Rfl:     aspirin 81 mg EC tablet, Take 1 tablet (81 mg) by mouth once daily., Disp: , Rfl:     atorvastatin (Lipitor) 20 mg tablet, TAKE 1/2 TABLET EVERY DAY, Disp: 45 tablet, Rfl: 3    blood-glucose meter misc, Check blood sugars daily for diagnosis 11.9 non-insulin-dependent, Disp: 1 each, Rfl: 0    cholecalciferol (Vitamin D-3) 50 mcg (2,000 unit) capsule, Take 1 capsule (50 mcg) by mouth early in the morning.., Disp: , Rfl:     cyanocobalamin (Vitamin B-12) 1,000 mcg/mL injection, INJECT 1 ML AS DIRECTED EVERY 30 DAYS., Disp: 1 mL, Rfl: 0    cyanocobalamin, vitamin B-12, (B-12 Compliance) 1,000 mcg/mL kit, Inject 1 mL as directed every 30 (thirty) days., Disp: 1 kit, Rfl: 1    DULoxetine (Cymbalta) 60 mg DR capsule, TAKE 1 CAPSULE EVERY DAY, Disp: 90 capsule, Rfl: 3    hydroCHLOROthiazide (HYDRODiuril) 50 mg tablet, Take 1 tablet (50 mg) by mouth once daily., Disp: 90 tablet, Rfl: 3    lidocaine 4 % patch, Place 2 patches over 12 hours on the skin once daily. Remove & discard patch within 12 hours or as directed by MD., Disp: 14 patch, Rfl: 0    loratadine (Claritin) 10 mg tablet, Take 1 tablet (10 mg) by mouth if needed for allergies., Disp: , Rfl:     magnesium 250 mg tablet, Take 1 tablet (250 mg) by mouth once daily. 1 tablet by mouth once a day, Disp: , Rfl:     multivitamin with minerals (multivit-min-iron fum-folic ac) tablet,  Take 1 tablet by mouth once daily., Disp: , Rfl:     neomycin-polymyxin-dexAMETHasone (Maxitrol) 3.5mg/mL-10,000 unit/mL-0.1 % ophthalmic suspension, INSTILL ONE DROP INTO RIGHT EYE THREE TIMES A DAY FOR 5 TO 7 DAYS, Disp: , Rfl:     OneTouch Delica Plus Lancet 33 gauge misc, TEST EVERY DAY, Disp: , Rfl:     oxybutynin (Ditropan) 5 mg tablet, Take 1 tablet (5 mg) by mouth 2 times a day., Disp: 60 tablet, Rfl: 11    oxyCODONE (Roxicodone) 5 mg immediate release tablet, Take 1 tablet (5 mg) by mouth every 6 hours if needed for severe pain (7 - 10)., Disp: 28 tablet, Rfl: 0    oxyCODONE-acetaminophen (Percocet) 5-325 mg tablet, Take 1 tablet by mouth every 6 hours., Disp: , Rfl:     plecanatide (Trulance) tablet tablet, Take 1 tablet (3 mg) by mouth 2 times a day. 1 tablet by mouth 2 times a day, Disp: , Rfl:     polyethylene glycol (Glycolax) 17 gram/dose powder, Mix 17 g of powder and drink once daily., Disp: , Rfl:     potassium chloride CR 20 mEq ER tablet, Take 1 tablet (20 mEq) by mouth once daily., Disp: 30 tablet, Rfl: 3    pregabalin (Lyrica) 150 mg capsule, Take 1 capsule (150 mg) by mouth every 12 hours., Disp: , Rfl:     sennosides-docusate sodium (Fay-Colace) 8.6-50 mg tablet, Take 2 tablets by mouth 2 times a day. FOR CONSTIPATION WHILE TAKING PAIN MEDICATION, Disp: 14 tablet, Rfl: 0    traZODone (Desyrel) 50 mg tablet, TAKE 1 TO 2 TABLETS AT BEDTIME AS NEEDED FOR SLEEP, Disp: 60 tablet, Rfl: 11    cyclobenzaprine (Flexeril) 10 mg tablet, Take 1 tablet (10 mg) by mouth 3 times a day as needed for muscle spasms for up to 10 days., Disp: 30 tablet, Rfl: 0  Allergies   Allergen Reactions    Hydromorphone (Bulk) Itching    Iodinated Contrast Media Other and Rash     Pt. states it makes her develop a rash and feel hot    Sulindac Unknown and Rash     Increased blood count, liver malfunction       Physical Examination:    General: Well developed, awake/alert/oriented x3, no distress, alert and cooperative.  Speech clear.   Skin: Warm and dry, no lesions, no rashes  ENMT: Mucous membranes moist, no apparent injury, no lesions seen  Head/Neck: Neck Supple, no apparent injury  Respiratory/Thorax: Normal breath sounds with good chest expansion, thorax symmetric  Cardiovascular: No pitting edema, no JVD    Motor Strength: 5/5 Throughout all extremities    Muscle Bulk: Normal and symmetric in all extremities    Posture:   -- Cervical: Normal  -- Thoracic: Normal  -- Lumbar : Normal    Paraspinal muscle tenderness present upper trapezius bilaterally     Sensation: intact to light touch        Assessment and Plan:      Gabby Madrid is a 75 y.o. female who presents to the spine clinic in follow up for neck, mid back and low back pain. She reports pain is improved with warmer weather. Lumbar X-rays completed 1/15/25 demonstrated hardware in good positioning. Cervical X-rays demonstrated cervical hardware in good positioning, however there was compression of C4-5 and C7-T1 with degenerative changes noted. She does report some relief with recent medial branch block. It is my medical opinion Gabby should undergo MRI of the cervical spine to assess soft tissues and to evaluate for any nerve compression. Will obtain X-rays of the thoracic spine. Refill for Lyrica provided today. I have encouraged her to continue with conservative care and follow up with pain management as needed. I will follow up with her upon completion of imaging studies with further recommendations at that time. She is agreeable to this plan.         The above clinical summary has been dictated with voice recognition software. It has not been proofread for grammatical errors, typographical mistakes, or other semantic inconsistencies.     Thank you for visiting our office today. It was our pleasure to take part in your healthcare.      Do not hesitate to call with any questions regarding your plan of care after leaving at [phone] M-F 8am-4pm.      To  clinicians, thank you very much for this kind referral. It is a privilege to partner with you in the care of your patients. My office would be delighted to assist you with any further consultations or with questions regarding the plan of care outlined. Do not hesitate to call the office or contact me directly.         Sincerely,          Eloisa CASILLAS-[place] [address] [address] [place], [place] [id number]    [address] [place] [address]  [address] [place] [address]     Phone: [phone]  Fax: [phone]

## 2025-04-23 ENCOUNTER — HOSPITAL ENCOUNTER (OUTPATIENT)
Dept: RADIOLOGY | Facility: HOSPITAL | Age: 75
Discharge: HOME | End: 2025-04-23
Payer: MEDICARE

## 2025-04-23 DIAGNOSIS — M54.12 CERVICAL RADICULOPATHY: ICD-10-CM

## 2025-04-23 DIAGNOSIS — M54.9 MID BACK PAIN: ICD-10-CM

## 2025-04-23 PROCEDURE — 72070 X-RAY EXAM THORAC SPINE 2VWS: CPT

## 2025-04-23 PROCEDURE — 72141 MRI NECK SPINE W/O DYE: CPT

## 2025-04-23 PROCEDURE — 72070 X-RAY EXAM THORAC SPINE 2VWS: CPT | Performed by: RADIOLOGY

## 2025-05-06 ENCOUNTER — APPOINTMENT (OUTPATIENT)
Dept: UROLOGY | Facility: HOSPITAL | Age: 75
End: 2025-05-06
Payer: MEDICARE

## 2025-05-09 ENCOUNTER — APPOINTMENT (OUTPATIENT)
Age: 75
End: 2025-05-09
Payer: MEDICARE

## 2025-05-09 VITALS — HEART RATE: 97 BPM | SYSTOLIC BLOOD PRESSURE: 133 MMHG | DIASTOLIC BLOOD PRESSURE: 72 MMHG

## 2025-05-09 DIAGNOSIS — C64.1 MALIGNANT NEOPLASM OF RIGHT KIDNEY (MULTI): Primary | ICD-10-CM

## 2025-05-09 PROCEDURE — 3078F DIAST BP <80 MM HG: CPT | Performed by: STUDENT IN AN ORGANIZED HEALTH CARE EDUCATION/TRAINING PROGRAM

## 2025-05-09 PROCEDURE — 3075F SYST BP GE 130 - 139MM HG: CPT | Performed by: STUDENT IN AN ORGANIZED HEALTH CARE EDUCATION/TRAINING PROGRAM

## 2025-05-09 PROCEDURE — 1159F MED LIST DOCD IN RCRD: CPT | Performed by: STUDENT IN AN ORGANIZED HEALTH CARE EDUCATION/TRAINING PROGRAM

## 2025-05-09 PROCEDURE — G2211 COMPLEX E/M VISIT ADD ON: HCPCS | Performed by: STUDENT IN AN ORGANIZED HEALTH CARE EDUCATION/TRAINING PROGRAM

## 2025-05-09 PROCEDURE — 99213 OFFICE O/P EST LOW 20 MIN: CPT | Performed by: STUDENT IN AN ORGANIZED HEALTH CARE EDUCATION/TRAINING PROGRAM

## 2025-05-09 NOTE — PATIENT INSTRUCTIONS
Please go to nearest outpatient  lab to get labs obtained prior to your next visit    Please call Radiology at [phone] to schedule imaging     Follow up visit in 1 year

## 2025-05-09 NOTE — PROGRESS NOTES
Subjective    Gbaby Madrid is a 75 y.o. female  with an endophytic R renal mass s/p URS with stent placement who presents for 1 year FUV.     MR Abd renal 4/2025 showed  overall similar size and appearance of a heterogenously hyperenhancing endophytic mass arising from the inferior pole of the right kidney, currently measuring 1.8 x 2.1 x 2.4 cm and previously measuring 1.9 x 2.2 x 2.5 cm, with similar extension to the adjacent inferolateral renal sinus. No evidence of intralesional macroscopic fat.     This was initially thought to be a potential urothelial carcinoma. I took her for URS in Feb 2022. I did not find a tumour within the renal collecting system but I did find a stone which I removed. She subsequently had stone surgery with Dr. Flores for an obstructing acute stone. The stone was calcium based 70% CaOx 30% CaP. She is a 12x stone former. Has done Litholink in the past.     Review of Systems    All systems were reviewed. Anything negative was noted in the HPI.    Objective   Physical Exam  Gen: No acute distress      Psych: Alert and oriented x3      Neuro:  Normal ROM     Resp: Nonlabored respirations      CV: Regular rate and rhythm      Abd: S, NT, ND.     : Deferred     Skin: Warm, dry and intact without rashes      Lymphatics: No peripheral edema           Medical History[1]      Surgical History[2]      Assessment & Plan  Malignant neoplasm of right kidney (Multi)  I reviewed her MRI and we discussed this in detail. MR Abd renal 4/2025 showed  overall similar size and appearance of a heterogenously hyperenhancing endophytic mass arising from the inferior pole of the right kidney, currently measuring 1.8 x 2.1 x 2.4 cm and previously measuring 1.9 x 2.2 x 2.5 cm, with similar extension to the adjacent inferolateral renal sinus. No evidence of intralesional macroscopic fat.    PLAN:   One year surveillance FUV with kidney MRI and BMP    Orders:    MR kidney w and wo IV contrast; Future    Basic  metabolic panel; Future                               Scribe Attestation  By signing my name below, I, Amparo Hodges   attest that this documentation has been prepared under the direction and in the presence of Tesfaye Adam MD MPH         [1]   Past Medical History:  Diagnosis Date    Anxiety     Depression     Hypertension     Personal history of other diseases of the circulatory system 10/28/2015    History of hypertension    Personal history of other endocrine, nutritional and metabolic disease 04/02/2021    History of type 2 diabetes mellitus    Type 2 diabetes mellitus with diabetic neuropathy, unspecified (Multi) 10/28/2015    Diabetic neuropathy associated with type 2 diabetes mellitus   [2]   Past Surgical History:  Procedure Laterality Date    APPENDECTOMY  12/22/2015    Appendectomy    CT ANGIO NECK  4/28/2017    CT NECK ANGIO W AND WO IV CONTRAST 4/28/2017 [facility]    HYSTERECTOMY  12/22/2015    Hysterectomy    LUMBAR FUSION  09/29/2016    Lumbar Vertebral Fusion    MR HEAD ANGIO WO IV CONTRAST  12/8/2020    MR HEAD ANGIO WO IV CONTRAST 12/8/2020 [facility]    OTHER SURGICAL HISTORY  08/30/2016    Spinal Diskectomy Lumbar    OTHER SURGICAL HISTORY  05/19/2022    Hip replacement    TONSILLECTOMY  12/22/2015    Tonsillectomy    TOTAL KNEE ARTHROPLASTY  08/30/2016    Knee Replacement

## 2025-05-09 NOTE — ASSESSMENT & PLAN NOTE
I reviewed her MRI and we discussed this in detail. MR Abd renal 4/2025 showed  overall similar size and appearance of a heterogenously hyperenhancing endophytic mass arising from the inferior pole of the right kidney, currently measuring 1.8 x 2.1 x 2.4 cm and previously measuring 1.9 x 2.2 x 2.5 cm, with similar extension to the adjacent inferolateral renal sinus. No evidence of intralesional macroscopic fat.    PLAN:   One year surveillance FUV with kidney MRI and BMP    Orders:    MR kidney w and wo IV contrast; Future    Basic metabolic panel; Future

## 2025-05-13 ENCOUNTER — DOCUMENTATION (OUTPATIENT)
Facility: CLINIC | Age: 75
End: 2025-05-13
Payer: MEDICARE

## 2025-05-13 NOTE — PROGRESS NOTES
Discussed results of cervical MRI. There is evidence of C4-5 disc disease above the level of her prior fusion. Patient [place] [facility] planning to proceed with RFA/OLGA at this time. She will call with any worsening symptoms.       Eloisa CASILLAS-[facility]  [address] [address] [place] [address]    [address] [place] [address]  [address] [place] [address]     Phone: [phone]  Fax: [phone]

## 2025-05-19 ENCOUNTER — APPOINTMENT (OUTPATIENT)
Dept: CARDIOLOGY | Facility: CLINIC | Age: 75
End: 2025-05-19
Payer: MEDICARE

## 2025-05-19 VITALS
HEIGHT: 66 IN | HEART RATE: 78 BPM | OXYGEN SATURATION: 96 % | DIASTOLIC BLOOD PRESSURE: 70 MMHG | SYSTOLIC BLOOD PRESSURE: 132 MMHG | BODY MASS INDEX: 24.65 KG/M2 | WEIGHT: 153.4 LBS

## 2025-05-19 DIAGNOSIS — E78.00 HYPERCHOLESTEROLEMIA: ICD-10-CM

## 2025-05-19 DIAGNOSIS — Z01.818 PRE-OPERATIVE CLEARANCE: Primary | ICD-10-CM

## 2025-05-19 DIAGNOSIS — I10 ESSENTIAL (PRIMARY) HYPERTENSION: ICD-10-CM

## 2025-05-19 DIAGNOSIS — E78.2 MIXED HYPERLIPIDEMIA: ICD-10-CM

## 2025-05-19 PROCEDURE — 1159F MED LIST DOCD IN RCRD: CPT

## 2025-05-19 PROCEDURE — 3008F BODY MASS INDEX DOCD: CPT

## 2025-05-19 PROCEDURE — 3078F DIAST BP <80 MM HG: CPT

## 2025-05-19 PROCEDURE — 93000 ELECTROCARDIOGRAM COMPLETE: CPT

## 2025-05-19 PROCEDURE — 99205 OFFICE O/P NEW HI 60 MIN: CPT

## 2025-05-19 PROCEDURE — 3075F SYST BP GE 130 - 139MM HG: CPT

## 2025-05-19 RX ORDER — ATORVASTATIN CALCIUM 20 MG/1
20 TABLET, FILM COATED ORAL DAILY
Qty: 90 TABLET | Refills: 3 | Status: SHIPPED | OUTPATIENT
Start: 2025-05-19 | End: 2026-05-19

## 2025-05-19 NOTE — PROGRESS NOTES
"Chief Complaint:   Annual follow-up     History Of Present Illness:    Gabby Madrid is a 75 y.o. female with PMHx of CAD, HTN, HLD, SOB, chest pain, MS T2DM, chronic back surgery, and OA presenting today for annual follow-up and preoperative clearance. She will be undergoing nerve block with Dr. Pichardo pain management and was asked to hold her aspirin prior to this.  Patient reports that since her last visit in our office back in 2022 she has undergone a back surgery and revision due to broken screw.  She reports her most recent back surgery was in November last year and she still continues to have some back pain.  She reports that other than the back pain/issues she has been feeling well overall and denies any new cardiac symptoms. She denies any CP, SOB, palpitations, lightheadedness, syncope, orthopnea, PND, lower extremity edema.  She reports that she is currently undergoing the process of moving to a new house as they sold theirs and is under some stress.  She tells me that her physical mobility is somewhat limited due to her back.     Last Recorded Vitals:  Vitals:    05/19/25 1336   BP: 132/70   BP Location: Left arm   Patient Position: Sitting   BP Cuff Size: Adult   Pulse: 78   SpO2: 96%   Weight: 69.6 kg (153 lb 6.4 oz)   Height: 1.676 m (5' 6\")     Past Medical History:  She has a past medical history of Anxiety, Depression, Hypertension, Personal history of other diseases of the circulatory system (10/28/2015), Personal history of other endocrine, nutritional and metabolic disease (04/02/2021), and Type 2 diabetes mellitus with diabetic neuropathy, unspecified (Multi) (10/28/2015).    Past Surgical History:  She has a past surgical history that includes Other surgical history (08/30/2016); Total knee arthroplasty (08/30/2016); Other surgical history (05/19/2022); Tonsillectomy (12/22/2015); Appendectomy (12/22/2015); Hysterectomy (12/22/2015); Lumbar fusion (09/29/2016); MR angio head wo IV contrast " (12/8/2020); and CT angio neck (4/28/2017).      Social History:  She reports that she has never smoked. She has never used smokeless tobacco. She reports that she does not drink alcohol and does not use drugs.    Family History:  Family History[1]     Allergies:  Hydromorphone (bulk), Iodinated contrast media, and Sulindac    Outpatient Medications:  Current Outpatient Medications   Medication Instructions    alpha tocopherol (VITAMIN E) 1,000 Units, Daily RT    amLODIPine (NORVASC) 5 mg, oral, Daily    ascorbic acid, vitamin C, 1,000 mg ER tablet 1 tablet, Daily    aspirin 81 mg EC tablet 1 tablet, Daily    atorvastatin (Lipitor) 20 mg tablet TAKE 1/2 TABLET EVERY DAY    blood-glucose meter misc Check blood sugars daily for diagnosis 11.9 non-insulin-dependent    cholecalciferol (Vitamin D-3) 50 mcg (2,000 unit) capsule 1 capsule, Daily    cyanocobalamin (Vitamin B-12) 1,000 mcg/mL injection INJECT 1 ML AS DIRECTED EVERY 30 DAYS.    cyanocobalamin, vitamin B-12, (B-12 Compliance) 1,000 mcg/mL kit 1 mL, injection, Every 30 days    DULoxetine (CYMBALTA) 60 mg, oral, Daily    hydroCHLOROthiazide (HYDRODIURIL) 50 mg, oral, Daily    lidocaine 4 % patch 2 patches, transdermal, Daily, Remove & discard patch within 12 hours or as directed by MD.    magnesium 250 mg, Daily    multivitamin with minerals (multivit-min-iron fum-folic ac) tablet 1 tablet, Daily    neomycin-polymyxin-dexAMETHasone (Maxitrol) 3.5mg/mL-10,000 unit/mL-0.1 % ophthalmic suspension INSTILL ONE DROP INTO RIGHT EYE THREE TIMES A DAY FOR 5 TO 7 DAYS    OneTouch Delica Plus Lancet 33 gauge misc TEST EVERY DAY    oxyBUTYnin (DITROPAN) 5 mg, oral, 2 times daily    oxyCODONE (ROXICODONE) 5 mg, oral, Every 6 hours PRN    oxyCODONE-acetaminophen (Percocet) 5-325 mg tablet 1 tablet, Every 6 hours    plecanatide (TRULANCE) 3 mg, 2 times daily    pregabalin (LYRICA) 75 mg, oral, Every 12 hours    sennosides-docusate sodium (Fay-Colace) 8.6-50 mg tablet 2  tablets, oral, 2 times daily, FOR CONSTIPATION WHILE TAKING PAIN MEDICATION    traZODone (Desyrel) 50 mg tablet TAKE 1 TO 2 TABLETS AT BEDTIME AS NEEDED FOR SLEEP     Physical Exam:  General: no acute distress  HEENT: EOMI, no scleral icterus.  Lungs: Clear to auscultation bilaterally without wheezing, rales, or rhonchi.  Cardiovascular: Regular rhythm and rate. Normal S1 and S2. No murmurs, rubs, or gallops are appreciated. JVP normal.  Abdomen: Soft, nontender, nondistended. Bowel sounds present.  Extremities: Warm and well perfused with equal 2+ pulses bilaterally.  No edema.  Neurologic: Alert and oriented x3.      Last Labs:  CBC -  Lab Results   Component Value Date    WBC 8.5 11/08/2024    HGB 10.0 (L) 11/08/2024    HCT 31.1 (L) 11/08/2024    MCV 96 11/08/2024     11/08/2024     CMP -  Lab Results   Component Value Date    CALCIUM 8.1 (L) 11/08/2024    PHOS 3.5 11/02/2022    PROT 6.2 (L) 08/05/2024    ALBUMIN 4.1 08/05/2024    AST 23 08/05/2024    ALT 19 08/05/2024    ALKPHOS 68 08/05/2024    BILITOT 0.9 08/05/2024     LIPID PANEL -   Lab Results   Component Value Date    CHOL 166 08/05/2024    TRIG 156 (H) 08/05/2024    HDL 47.5 08/05/2024    CHHDL 3.5 08/05/2024    LDLF 91 04/07/2023    VLDL 31 08/05/2024    NHDL 119 08/05/2024     RENAL FUNCTION PANEL -   Lab Results   Component Value Date    GLUCOSE 116 (H) 11/08/2024     11/08/2024    K 3.2 (L) 11/08/2024     11/08/2024    CO2 34 (H) 11/08/2024    ANIONGAP 11 11/08/2024    BUN 15 11/08/2024    CREATININE 0.74 11/08/2024    CALCIUM 8.1 (L) 11/08/2024    PHOS 3.5 11/02/2022    ALBUMIN 4.1 08/05/2024      Lab Results   Component Value Date    BNP 23 04/02/2021    HGBA1C 6.3 (H) 08/05/2024    HGBA1C 5.8 12/01/2021     Last Cardiology Tests:  ECG:  EKG 5/19/2025  NSR with PACs and ventricular rate of 78 bpm    Echo:  TTE 3/16/2021  1. The left ventricular systolic function is normal with a 60-65% estimated ejection fraction.   2. Spectral  Doppler shows an impaired relaxation pattern of left ventricular diastolic filling.    Cath:  No results found for this or any previous visit from the past 1095 days.    Stress Test:  No results found for this or any previous visit from the past 1095 days.    Cardiac Imaging:  CT Abd 12/08/2021  Probable renal cell carcinoma. Nephrolithiasis. Lung nodules. Hepatic steatosis. Diverticulosis.     CT SCORING 04/23/2021  LM: 0.  LAD: 0.  LCx: 14.62.  RCA: 158.32.  Total: 173    CT calcium score/12/2017  LM                     0  LAD                   0  LCx                    11.32  RCA                   42.32  Total: 53.64    CAROTID 04/12/2017  Mild left, minimal right carotid plaque; no stenosis greater than 50%. 2. The left vertebral artery is not able to be seen. This is secondary to left vertebral artery hypoplasia or occlusion. CT angiogram of the neck is recommended for further evaluation.     I have personally reviewed most recent PCP, cardiology, vascular, studies and/or documentation.      Assessment/Plan   CAD, elevated CT calcium score 53.64 in 2017 and repeat was 173 units in 2021. Patient had previously reported complaints of chest pain that resolved on their own and were likely related to MS pain.  EKG in office today showing sinus rhythm with PACs and ventricular rate of 78 bpm.  Patient denies any complaint of chest pain whatsoever.  Continue aspirin and high intensity statin therapy.    HTN, stable, /70.  She is on HCTZ 50 mg daily and amlodipine 5 mg daily.    HLD, 8/5/2024 LDL 87, HDL 47.5, triglycerides 156. She is on atorvastatin 20mg daily, but is only taking 1/2 tablet daily.  Goal LDL <70.  I am asking her to start taking a full tablet atorvastatin 20 mg daily.  She will have repeat fasting blood work done with her annual labs sometime this August.    Preoperative clearance, she will be having nerve block with Dr. Gerry Pichardo pain management and was asked to hold her aspirin.  Patient is  cleared with low risk and she is ok to hold his aspirin x5 day prior to procedure and restart as soon as possible.      Follow-up with Alejandra in 6 months.     VINNY Weiner-ERNESTO         [1]   Family History  Problem Relation Name Age of Onset    Dementia Mother      Heart attack Father  63    Other (Cardiac Disorder) Father      Other (PPM) Maternal Grandfather      Other (Cardiac Disorder) Maternal Grandfather      Heart attack Half-Brother  43            Other (Cardiac Disorder) Half-Brother

## 2025-05-19 NOTE — LETTER
"May 19, 2025     Gerry Pichardo MD  [id number] [facility] [id number]    Patient: Gabby Madrid   YOB: 1950   Date of Visit: 5/19/2025       Dear Dr. Gerry Pichardo MD:    Thank you for referring Gabby Madrid to me for evaluation. Below are my notes for this consultation.  If you have questions, please do not hesitate to call me. I look forward to following your patient along with you.       Sincerely,     Melania Farrell, APRN-CNP      CC: No Recipients  ______________________________________________________________________________________    Chief Complaint:   Annual follow-up     History Of Present Illness:    Gabby Madrid is a 75 y.o. female with PMHx of CAD, HTN, HLD, SOB, chest pain, MS T2DM, chronic back surgery, and OA presenting today for annual follow-up and preoperative clearance. She will be undergoing nerve block with Dr. Pichardo pain management and was asked to hold her aspirin prior to this.  Patient reports that since her last visit in our office back in 2022 she has undergone a back surgery and revision due to broken screw.  She reports her most recent back surgery was in November last year and she still continues to have some back pain.  She reports that other than the back pain/issues she has been feeling well overall and denies any new cardiac symptoms. She denies any CP, SOB, palpitations, lightheadedness, syncope, orthopnea, PND, lower extremity edema.  She reports that she is currently undergoing the process of moving to a new house as they sold theirs and is under some stress.  She tells me that her physical mobility is somewhat limited due to her back.     Last Recorded Vitals:  Vitals:    05/19/25 1336   BP: 132/70   BP Location: Left arm   Patient Position: Sitting   BP Cuff Size: Adult   Pulse: 78   SpO2: 96%   Weight: 69.6 kg (153 lb 6.4 oz)   Height: 1.676 m (5' 6\")     Past Medical History:  She has a past medical history of Anxiety, " Depression, Hypertension, Personal history of other diseases of the circulatory system (10/28/2015), Personal history of other endocrine, nutritional and metabolic disease (04/02/2021), and Type 2 diabetes mellitus with diabetic neuropathy, unspecified (Multi) (10/28/2015).    Past Surgical History:  She has a past surgical history that includes Other surgical history (08/30/2016); Total knee arthroplasty (08/30/2016); Other surgical history (05/19/2022); Tonsillectomy (12/22/2015); Appendectomy (12/22/2015); Hysterectomy (12/22/2015); Lumbar fusion (09/29/2016); MR angio head wo IV contrast (12/8/2020); and CT angio neck (4/28/2017).      Social History:  She reports that she has never smoked. She has never used smokeless tobacco. She reports that she does not drink alcohol and does not use drugs.    Family History:  Family History[1]     Allergies:  Hydromorphone (bulk), Iodinated contrast media, and Sulindac    Outpatient Medications:  Current Outpatient Medications   Medication Instructions   • alpha tocopherol (VITAMIN E) 1,000 Units, Daily RT   • amLODIPine (NORVASC) 5 mg, oral, Daily   • ascorbic acid, vitamin C, 1,000 mg ER tablet 1 tablet, Daily   • aspirin 81 mg EC tablet 1 tablet, Daily   • atorvastatin (Lipitor) 20 mg tablet TAKE 1/2 TABLET EVERY DAY   • blood-glucose meter misc Check blood sugars daily for diagnosis 11.9 non-insulin-dependent   • cholecalciferol (Vitamin D-3) 50 mcg (2,000 unit) capsule 1 capsule, Daily   • cyanocobalamin (Vitamin B-12) 1,000 mcg/mL injection INJECT 1 ML AS DIRECTED EVERY 30 DAYS.   • cyanocobalamin, vitamin B-12, (B-12 Compliance) 1,000 mcg/mL kit 1 mL, injection, Every 30 days   • DULoxetine (CYMBALTA) 60 mg, oral, Daily   • hydroCHLOROthiazide (HYDRODIURIL) 50 mg, oral, Daily   • lidocaine 4 % patch 2 patches, transdermal, Daily, Remove & discard patch within 12 hours or as directed by MD.   • magnesium 250 mg, Daily   • multivitamin with minerals (multivit-min-iron  fum-folic ac) tablet 1 tablet, Daily   • neomycin-polymyxin-dexAMETHasone (Maxitrol) 3.5mg/mL-10,000 unit/mL-0.1 % ophthalmic suspension INSTILL ONE DROP INTO RIGHT EYE THREE TIMES A DAY FOR 5 TO 7 DAYS   • OneTouch Delica Plus Lancet 33 gauge misc TEST EVERY DAY   • oxyBUTYnin (DITROPAN) 5 mg, oral, 2 times daily   • oxyCODONE (ROXICODONE) 5 mg, oral, Every 6 hours PRN   • oxyCODONE-acetaminophen (Percocet) 5-325 mg tablet 1 tablet, Every 6 hours   • plecanatide (TRULANCE) 3 mg, 2 times daily   • pregabalin (LYRICA) 75 mg, oral, Every 12 hours   • sennosides-docusate sodium (Fay-Colace) 8.6-50 mg tablet 2 tablets, oral, 2 times daily, FOR CONSTIPATION WHILE TAKING PAIN MEDICATION   • traZODone (Desyrel) 50 mg tablet TAKE 1 TO 2 TABLETS AT BEDTIME AS NEEDED FOR SLEEP     Physical Exam:  General: no acute distress  HEENT: EOMI, no scleral icterus.  Lungs: Clear to auscultation bilaterally without wheezing, rales, or rhonchi.  Cardiovascular: Regular rhythm and rate. Normal S1 and S2. No murmurs, rubs, or gallops are appreciated. JVP normal.  Abdomen: Soft, nontender, nondistended. Bowel sounds present.  Extremities: Warm and well perfused with equal 2+ pulses bilaterally.  No edema.  Neurologic: Alert and oriented x3.      Last Labs:  CBC -  Lab Results   Component Value Date    WBC 8.5 11/08/2024    HGB 10.0 (L) 11/08/2024    HCT 31.1 (L) 11/08/2024    MCV 96 11/08/2024     11/08/2024     CMP -  Lab Results   Component Value Date    CALCIUM 8.1 (L) 11/08/2024    PHOS 3.5 11/02/2022    PROT 6.2 (L) 08/05/2024    ALBUMIN 4.1 08/05/2024    AST 23 08/05/2024    ALT 19 08/05/2024    ALKPHOS 68 08/05/2024    BILITOT 0.9 08/05/2024     LIPID PANEL -   Lab Results   Component Value Date    CHOL 166 08/05/2024    TRIG 156 (H) 08/05/2024    HDL 47.5 08/05/2024    CHHDL 3.5 08/05/2024    LDLF 91 04/07/2023    VLDL 31 08/05/2024    NHDL 119 08/05/2024     RENAL FUNCTION PANEL -   Lab Results   Component Value Date     GLUCOSE 116 (H) 11/08/2024     11/08/2024    K 3.2 (L) 11/08/2024     11/08/2024    CO2 34 (H) 11/08/2024    ANIONGAP 11 11/08/2024    BUN 15 11/08/2024    CREATININE 0.74 11/08/2024    CALCIUM 8.1 (L) 11/08/2024    PHOS 3.5 11/02/2022    ALBUMIN 4.1 08/05/2024      Lab Results   Component Value Date    BNP 23 04/02/2021    HGBA1C 6.3 (H) 08/05/2024    HGBA1C 5.8 12/01/2021     Last Cardiology Tests:  ECG:  EKG 5/19/2025  NSR with PACs and ventricular rate of 78 bpm    Echo:  TTE 3/16/2021  1. The left ventricular systolic function is normal with a 60-65% estimated ejection fraction.   2. Spectral Doppler shows an impaired relaxation pattern of left ventricular diastolic filling.    Cath:  No results found for this or any previous visit from the past 1095 days.    Stress Test:  No results found for this or any previous visit from the past 1095 days.    Cardiac Imaging:  CT Abd 12/08/2021  Probable renal cell carcinoma. Nephrolithiasis. Lung nodules. Hepatic steatosis. Diverticulosis.     CT SCORING 04/23/2021  LM: 0.  LAD: 0.  LCx: 14.62.  RCA: 158.32.  Total: 173    CT calcium score/12/2017  LM                     0  LAD                   0  LCx                    11.32  RCA                   42.32  Total: 53.64    CAROTID 04/12/2017  Mild left, minimal right carotid plaque; no stenosis greater than 50%. 2. The left vertebral artery is not able to be seen. This is secondary to left vertebral artery hypoplasia or occlusion. CT angiogram of the neck is recommended for further evaluation.     I have personally reviewed most recent PCP, cardiology, vascular, studies and/or documentation.      Assessment/Plan  CAD, elevated CT calcium score 53.64 in 2017 and repeat was 173 units in 2021. Patient had previously reported complaints of chest pain that resolved on their own and were likely related to MS pain.  EKG in office today showing sinus rhythm with PACs and ventricular rate of 78 bpm.  Patient denies any  complaint of chest pain whatsoever.  Continue aspirin and high intensity statin therapy.    HTN, stable, /70.  She is on HCTZ 50 mg daily and amlodipine 5 mg daily.    HLD, 2024 LDL 87, HDL 47.5, triglycerides 156. She is on atorvastatin 20mg daily, but is only taking 1/2 tablet daily.  Goal LDL <70.  I am asking her to start taking a full tablet atorvastatin 20 mg daily.  She will have repeat fasting blood work done with her annual labs sometime this August.    Preoperative clearance, she will be having nerve block with Dr. Gerry Pichardo pain management and was asked to hold her aspirin.  Patient is cleared with low risk and she is ok to hold his aspirin x5 day prior to procedure and restart as soon as possible.      Follow-up with Alejandra in 6 months.     Melania Farrell, VINNY-CNP           [1]  Family History  Problem Relation Name Age of Onset   • Dementia Mother     • Heart attack Father  63   • Other (Cardiac Disorder) Father     • Other (PPM) Maternal Grandfather     • Other (Cardiac Disorder) Maternal Grandfather     • Heart attack Half-Brother  43           • Other (Cardiac Disorder) Half-Brother

## 2025-05-21 ENCOUNTER — TELEPHONE (OUTPATIENT)
Dept: PRIMARY CARE | Facility: CLINIC | Age: 75
End: 2025-05-21
Payer: MEDICARE

## 2025-05-21 NOTE — TELEPHONE ENCOUNTER
Pts is complaining about ear drainage, ear swelling, and crusting of the ear. Soonest appt was the 29th of this month. Any RX's you can send her or do you want me to move her closer?    Pts #[phone]

## 2025-05-23 ENCOUNTER — OFFICE VISIT (OUTPATIENT)
Dept: PRIMARY CARE | Facility: CLINIC | Age: 75
End: 2025-05-23
Payer: MEDICARE

## 2025-05-23 VITALS
SYSTOLIC BLOOD PRESSURE: 131 MMHG | WEIGHT: 151.8 LBS | OXYGEN SATURATION: 96 % | HEIGHT: 66 IN | BODY MASS INDEX: 24.4 KG/M2 | HEART RATE: 80 BPM | DIASTOLIC BLOOD PRESSURE: 77 MMHG | TEMPERATURE: 98.4 F

## 2025-05-23 DIAGNOSIS — H65.03 NON-RECURRENT ACUTE SEROUS OTITIS MEDIA OF BOTH EARS: ICD-10-CM

## 2025-05-23 DIAGNOSIS — H60.393 OTHER INFECTIVE ACUTE OTITIS EXTERNA OF BOTH EARS: Primary | ICD-10-CM

## 2025-05-23 DIAGNOSIS — R53.83 OTHER FATIGUE: ICD-10-CM

## 2025-05-23 PROCEDURE — 3075F SYST BP GE 130 - 139MM HG: CPT | Performed by: FAMILY MEDICINE

## 2025-05-23 PROCEDURE — 1159F MED LIST DOCD IN RCRD: CPT | Performed by: FAMILY MEDICINE

## 2025-05-23 PROCEDURE — 1125F AMNT PAIN NOTED PAIN PRSNT: CPT | Performed by: FAMILY MEDICINE

## 2025-05-23 PROCEDURE — 1160F RVW MEDS BY RX/DR IN RCRD: CPT | Performed by: FAMILY MEDICINE

## 2025-05-23 PROCEDURE — 99213 OFFICE O/P EST LOW 20 MIN: CPT | Performed by: FAMILY MEDICINE

## 2025-05-23 PROCEDURE — 3078F DIAST BP <80 MM HG: CPT | Performed by: FAMILY MEDICINE

## 2025-05-23 PROCEDURE — 3008F BODY MASS INDEX DOCD: CPT | Performed by: FAMILY MEDICINE

## 2025-05-23 PROCEDURE — 1036F TOBACCO NON-USER: CPT | Performed by: FAMILY MEDICINE

## 2025-05-23 PROCEDURE — G2211 COMPLEX E/M VISIT ADD ON: HCPCS | Performed by: FAMILY MEDICINE

## 2025-05-23 RX ORDER — NEOMYCIN SULFATE, POLYMYXIN B SULFATE, HYDROCORTISONE 3.5; 10000; 1 MG/ML; [USP'U]/ML; MG/ML
2 SOLUTION/ DROPS AURICULAR (OTIC) 4 TIMES DAILY
Qty: 10 ML | Refills: 0 | Status: SHIPPED | OUTPATIENT
Start: 2025-05-23 | End: 2025-06-17

## 2025-05-23 RX ORDER — CEFDINIR 300 MG/1
300 CAPSULE ORAL 2 TIMES DAILY
Qty: 20 CAPSULE | Refills: 0 | Status: SHIPPED | OUTPATIENT
Start: 2025-05-23 | End: 2025-06-02

## 2025-05-23 ASSESSMENT — PATIENT HEALTH QUESTIONNAIRE - PHQ9
2. FEELING DOWN, DEPRESSED OR HOPELESS: NOT AT ALL
1. LITTLE INTEREST OR PLEASURE IN DOING THINGS: NOT AT ALL
SUM OF ALL RESPONSES TO PHQ9 QUESTIONS 1 AND 2: 0

## 2025-05-23 ASSESSMENT — ENCOUNTER SYMPTOMS
DEPRESSION: 0
OCCASIONAL FEELINGS OF UNSTEADINESS: 0
CARDIOVASCULAR NEGATIVE: 1
LOSS OF SENSATION IN FEET: 0
CONSTITUTIONAL NEGATIVE: 1
RESPIRATORY NEGATIVE: 1

## 2025-05-23 ASSESSMENT — PAIN SCALES - GENERAL: PAINLEVEL_OUTOF10: 3

## 2025-05-23 NOTE — PROGRESS NOTES
"Subjective   Patient ID: Gabby Madrid is a 75 y.o. female who presents for misc (C/o both ears draining x 2wks).    HPI     Review of Systems   Constitutional: Negative.    HENT:  Positive for ear discharge and ear pain.    Respiratory: Negative.     Cardiovascular: Negative.        Objective   /77 (BP Location: Left arm)   Pulse 80   Temp 36.9 °C (98.4 °F) (Oral)   Ht 1.676 m (5' 6\")   Wt 68.9 kg (151 lb 12.8 oz)   SpO2 96%   BMI 24.50 kg/m²     Physical Exam  Vitals and nursing note reviewed.   Constitutional:       Appearance: Normal appearance.   HENT:      Ears:      Comments: Bilateral otitis externa with bilateral otitis media and fluid.     Mouth/Throat:      Pharynx: Oropharynx is clear.   Cardiovascular:      Rate and Rhythm: Regular rhythm.      Heart sounds: Normal heart sounds.   Pulmonary:      Breath sounds: Normal breath sounds.   Lymphadenopathy:      Comments: negative   Neurological:      Mental Status: She is alert.         Assessment/Plan patient seen here for some otitis media and otitis externa she is having some memory issues we are getting a B12 and a TSH level done.  I put her on antibiotics we will see her back as needed  Problem List Items Addressed This Visit           ICD-10-CM    Fatigue R53.83    Relevant Orders    TSH with reflex to Free T4 if abnormal    Vitamin B12     Other Visit Diagnoses         Codes      Other infective acute otitis externa of both ears    -  Primary H60.393    Relevant Medications    neomycin-polymyxin-HC (Cortisporin) otic solution      Non-recurrent acute serous otitis media of both ears     H65.03    Relevant Medications    cefdinir (Omnicef) 300 mg capsule               "

## 2025-05-29 ENCOUNTER — APPOINTMENT (OUTPATIENT)
Dept: PRIMARY CARE | Facility: CLINIC | Age: 75
End: 2025-05-29
Payer: MEDICARE

## 2025-06-02 LAB
TSH SERPL-ACNC: 2.89 MIU/L (ref 0.4–4.5)
VIT B12 SERPL-MCNC: 530 PG/ML (ref 200–1100)

## 2025-07-09 DIAGNOSIS — G47.00 INSOMNIA, UNSPECIFIED TYPE: ICD-10-CM

## 2025-07-09 RX ORDER — TRAZODONE HYDROCHLORIDE 50 MG/1
TABLET ORAL
Qty: 60 TABLET | Refills: 11 | Status: SHIPPED | OUTPATIENT
Start: 2025-07-09

## 2025-07-14 ENCOUNTER — TELEPHONE (OUTPATIENT)
Dept: PRIMARY CARE | Facility: CLINIC | Age: 75
End: 2025-07-14
Payer: MEDICARE

## 2025-07-14 DIAGNOSIS — H65.03 NON-RECURRENT ACUTE SEROUS OTITIS MEDIA OF BOTH EARS: Primary | ICD-10-CM

## 2025-07-14 RX ORDER — CEFDINIR 300 MG/1
300 CAPSULE ORAL 2 TIMES DAILY
Qty: 20 CAPSULE | Refills: 0 | Status: SHIPPED | OUTPATIENT
Start: 2025-07-14 | End: 2025-07-24

## 2025-07-14 NOTE — TELEPHONE ENCOUNTER
Pt was here in may & was given drops & abx.  Both ears at the time.  Right ear is better but left still has issues.  Feels equilibrium is off also.  Please advise  Nico [phone]  Allergy to hydromorphone,iodinated contrast,sulindac  Ty

## 2025-07-19 DIAGNOSIS — G89.4 CHRONIC PAIN SYNDROME: ICD-10-CM

## 2025-07-21 RX ORDER — DULOXETIN HYDROCHLORIDE 60 MG/1
60 CAPSULE, DELAYED RELEASE ORAL DAILY
Qty: 90 CAPSULE | Refills: 3 | Status: SHIPPED | OUTPATIENT
Start: 2025-07-21

## 2025-07-24 ENCOUNTER — TELEPHONE (OUTPATIENT)
Dept: PRIMARY CARE | Facility: CLINIC | Age: 75
End: 2025-07-24
Payer: MEDICARE

## 2025-08-04 ENCOUNTER — TELEPHONE (OUTPATIENT)
Dept: PRIMARY CARE | Facility: CLINIC | Age: 75
End: 2025-08-04
Payer: MEDICARE

## 2025-08-05 DIAGNOSIS — E11.8 CONTROLLED TYPE 2 DIABETES MELLITUS WITH COMPLICATION, WITHOUT LONG-TERM CURRENT USE OF INSULIN (MULTI): ICD-10-CM

## 2025-08-05 RX ORDER — IBUPROFEN 200 MG
1 CAPSULE ORAL DAILY
Qty: 100 STRIP | Refills: 3 | Status: SHIPPED | OUTPATIENT
Start: 2025-08-05

## 2025-08-05 RX ORDER — LANCETS
EACH MISCELLANEOUS
Qty: 100 EACH | Refills: 3 | Status: SHIPPED | OUTPATIENT
Start: 2025-08-05

## 2025-08-05 RX ORDER — DEXTROSE 4 G
TABLET,CHEWABLE ORAL
Qty: 1 EACH | Refills: 0 | Status: SHIPPED | OUTPATIENT
Start: 2025-08-05

## 2025-08-25 DIAGNOSIS — N20.0 KIDNEY STONE: ICD-10-CM

## 2025-08-25 RX ORDER — POTASSIUM CHLORIDE 20 MEQ/1
20 TABLET, EXTENDED RELEASE ORAL DAILY
Qty: 30 TABLET | Refills: 11 | Status: SHIPPED | OUTPATIENT
Start: 2025-08-25 | End: 2026-08-25

## 2025-08-28 ENCOUNTER — APPOINTMENT (OUTPATIENT)
Dept: PRIMARY CARE | Facility: CLINIC | Age: 75
End: 2025-08-28
Payer: MEDICARE

## 2025-08-28 VITALS
HEART RATE: 60 BPM | DIASTOLIC BLOOD PRESSURE: 75 MMHG | WEIGHT: 147.2 LBS | SYSTOLIC BLOOD PRESSURE: 131 MMHG | BODY MASS INDEX: 23.66 KG/M2 | HEIGHT: 66 IN | TEMPERATURE: 97.8 F | OXYGEN SATURATION: 98 %

## 2025-08-28 DIAGNOSIS — I10 ESSENTIAL HYPERTENSION: ICD-10-CM

## 2025-08-28 DIAGNOSIS — Z12.31 SCREENING MAMMOGRAM FOR BREAST CANCER: ICD-10-CM

## 2025-08-28 DIAGNOSIS — Z00.00 MEDICARE ANNUAL WELLNESS VISIT, SUBSEQUENT: Primary | ICD-10-CM

## 2025-08-28 DIAGNOSIS — E78.00 HYPERCHOLESTEROLEMIA: ICD-10-CM

## 2025-08-28 DIAGNOSIS — C64.1 RENAL CELL CARCINOMA OF RIGHT KIDNEY: ICD-10-CM

## 2025-08-28 DIAGNOSIS — E11.8 CONTROLLED TYPE 2 DIABETES MELLITUS WITH COMPLICATION, WITHOUT LONG-TERM CURRENT USE OF INSULIN (MULTI): ICD-10-CM

## 2025-08-28 PROCEDURE — 3075F SYST BP GE 130 - 139MM HG: CPT | Performed by: FAMILY MEDICINE

## 2025-08-28 PROCEDURE — 1170F FXNL STATUS ASSESSED: CPT | Performed by: FAMILY MEDICINE

## 2025-08-28 PROCEDURE — 3008F BODY MASS INDEX DOCD: CPT | Performed by: FAMILY MEDICINE

## 2025-08-28 PROCEDURE — 1125F AMNT PAIN NOTED PAIN PRSNT: CPT | Performed by: FAMILY MEDICINE

## 2025-08-28 PROCEDURE — 1036F TOBACCO NON-USER: CPT | Performed by: FAMILY MEDICINE

## 2025-08-28 PROCEDURE — 99497 ADVNCD CARE PLAN 30 MIN: CPT | Performed by: FAMILY MEDICINE

## 2025-08-28 PROCEDURE — G0439 PPPS, SUBSEQ VISIT: HCPCS | Performed by: FAMILY MEDICINE

## 2025-08-28 PROCEDURE — 3078F DIAST BP <80 MM HG: CPT | Performed by: FAMILY MEDICINE

## 2025-08-28 PROCEDURE — 1159F MED LIST DOCD IN RCRD: CPT | Performed by: FAMILY MEDICINE

## 2025-08-28 PROCEDURE — 1160F RVW MEDS BY RX/DR IN RCRD: CPT | Performed by: FAMILY MEDICINE

## 2025-08-28 PROCEDURE — 99214 OFFICE O/P EST MOD 30 MIN: CPT | Performed by: FAMILY MEDICINE

## 2025-08-28 RX ORDER — METHYLPREDNISOLONE 4 MG/1
TABLET ORAL
COMMUNITY
Start: 2025-08-11 | End: 2025-08-28 | Stop reason: ALTCHOICE

## 2025-08-28 ASSESSMENT — PATIENT HEALTH QUESTIONNAIRE - PHQ9
SUM OF ALL RESPONSES TO PHQ9 QUESTIONS 1 AND 2: 0
1. LITTLE INTEREST OR PLEASURE IN DOING THINGS: NOT AT ALL
2. FEELING DOWN, DEPRESSED OR HOPELESS: NOT AT ALL

## 2025-08-28 ASSESSMENT — ANXIETY QUESTIONNAIRES
2. NOT BEING ABLE TO STOP OR CONTROL WORRYING: NOT AT ALL
5. BEING SO RESTLESS THAT IT IS HARD TO SIT STILL: NOT AT ALL
6. BECOMING EASILY ANNOYED OR IRRITABLE: NOT AT ALL
4. TROUBLE RELAXING: NOT AT ALL
GAD7 TOTAL SCORE: 0
1. FEELING NERVOUS, ANXIOUS, OR ON EDGE: NOT AT ALL
7. FEELING AFRAID AS IF SOMETHING AWFUL MIGHT HAPPEN: NOT AT ALL
3. WORRYING TOO MUCH ABOUT DIFFERENT THINGS: NOT AT ALL

## 2025-08-28 ASSESSMENT — ACTIVITIES OF DAILY LIVING (ADL)
USING TELEPHONE: INDEPENDENT
MANAGING FINANCES: INDEPENDENT
DRESSING YOURSELF: INDEPENDENT
DOING HOUSEWORK: INDEPENDENT
TOILETING: INDEPENDENT
WALKS IN HOME: INDEPENDENT
NEEDS ASSISTANCE WITH FOOD: INDEPENDENT
USING TRANSPORTATION: INDEPENDENT
GROCERY SHOPPING: INDEPENDENT
PATIENT'S MEMORY ADEQUATE TO SAFELY COMPLETE DAILY ACTIVITIES?: YES
BATHING: INDEPENDENT
STIL DRIVING: YES
TAKING MEDICATION: INDEPENDENT
GROOMING: INDEPENDENT
FEEDING YOURSELF: INDEPENDENT
JUDGMENT_ADEQUATE_SAFELY_COMPLETE_DAILY_ACTIVITIES: YES
PREPARING MEALS: INDEPENDENT
EATING: INDEPENDENT
ADEQUATE_TO_COMPLETE_ADL: YES

## 2025-08-28 ASSESSMENT — PAIN SCALES - GENERAL: PAINLEVEL_OUTOF10: 2

## 2025-08-28 ASSESSMENT — ENCOUNTER SYMPTOMS
BACK PAIN: 1
PSYCHIATRIC NEGATIVE: 1
CARDIOVASCULAR NEGATIVE: 1
GASTROINTESTINAL NEGATIVE: 1
CONSTITUTIONAL NEGATIVE: 1
NEUROLOGICAL NEGATIVE: 1
RESPIRATORY NEGATIVE: 1
ENDOCRINE NEGATIVE: 1
DEPRESSION: 0
OCCASIONAL FEELINGS OF UNSTEADINESS: 0
LOSS OF SENSATION IN FEET: 0

## 2025-08-28 ASSESSMENT — GERIATRIC MINI NUTRITIONAL ASSESSMENT (MNA)
A HAS FOOD INTAKE DECLINED OVER THE PAST 3 MONTHS DUE TO LOSS OF APPETITE, DIGESTIVE PROBLEMS, CHEWING OR SWALLOWING DIFFICULTIES?: NO DECREASE IN FOOD INTAKE
C GENERAL MOBILITY: GOES OUT
D HAS SUFFERED PSYCHOLOGICAL STRESS OR ACUTE DISEASE IN THE PAST 3 MONTHS?: NO
B WEIGHT LOSS DURING THE LAST 3 MONTHS: NO WEIGHT LOSS
E NEUROPSYCHOLOGICAL PROBLEMS: NO PSYCHOLOGICAL PROBLEMS

## 2025-08-29 LAB
ALBUMIN SERPL-MCNC: 4.5 G/DL (ref 3.6–5.1)
ALP SERPL-CCNC: 67 U/L (ref 37–153)
ALT SERPL-CCNC: 25 U/L (ref 6–29)
ANION GAP SERPL CALCULATED.4IONS-SCNC: 8 MMOL/L (CALC) (ref 7–17)
AST SERPL-CCNC: 22 U/L (ref 10–35)
BASOPHILS # BLD AUTO: 85 CELLS/UL (ref 0–200)
BASOPHILS NFR BLD AUTO: 0.7 %
BILIRUB SERPL-MCNC: 1.1 MG/DL (ref 0.2–1.2)
BUN SERPL-MCNC: 20 MG/DL (ref 7–25)
CALCIUM SERPL-MCNC: 9.8 MG/DL (ref 8.6–10.4)
CHLORIDE SERPL-SCNC: 99 MMOL/L (ref 98–110)
CHOLEST SERPL-MCNC: 154 MG/DL
CHOLEST/HDLC SERPL: 2.6 (CALC)
CO2 SERPL-SCNC: 33 MMOL/L (ref 20–32)
CREAT SERPL-MCNC: 0.81 MG/DL (ref 0.6–1)
EGFRCR SERPLBLD CKD-EPI 2021: 76 ML/MIN/1.73M2
EOSINOPHIL # BLD AUTO: 61 CELLS/UL (ref 15–500)
EOSINOPHIL NFR BLD AUTO: 0.5 %
ERYTHROCYTE [DISTWIDTH] IN BLOOD BY AUTOMATED COUNT: 13.8 % (ref 11–15)
EST. AVERAGE GLUCOSE BLD GHB EST-MCNC: 134 MG/DL
EST. AVERAGE GLUCOSE BLD GHB EST-SCNC: 7.4 MMOL/L
GLUCOSE SERPL-MCNC: 118 MG/DL (ref 65–99)
HBA1C MFR BLD: 6.3 %
HCT VFR BLD AUTO: 48 % (ref 35–45)
HDLC SERPL-MCNC: 60 MG/DL
HGB BLD-MCNC: 16.2 G/DL (ref 11.7–15.5)
LDLC SERPL CALC-MCNC: 74 MG/DL (CALC)
LYMPHOCYTES # BLD AUTO: 2843 CELLS/UL (ref 850–3900)
LYMPHOCYTES NFR BLD AUTO: 23.3 %
MCH RBC QN AUTO: 33 PG (ref 27–33)
MCHC RBC AUTO-ENTMCNC: 33.8 G/DL (ref 32–36)
MCV RBC AUTO: 97.8 FL (ref 80–100)
MONOCYTES # BLD AUTO: 988 CELLS/UL (ref 200–950)
MONOCYTES NFR BLD AUTO: 8.1 %
NEUTROPHILS # BLD AUTO: 8223 CELLS/UL (ref 1500–7800)
NEUTROPHILS NFR BLD AUTO: 67.4 %
NONHDLC SERPL-MCNC: 94 MG/DL (CALC)
PLATELET # BLD AUTO: 275 THOUSAND/UL (ref 140–400)
PMV BLD REES-ECKER: 10.7 FL (ref 7.5–12.5)
POTASSIUM SERPL-SCNC: 3.9 MMOL/L (ref 3.5–5.3)
PROT SERPL-MCNC: 6.6 G/DL (ref 6.1–8.1)
RBC # BLD AUTO: 4.91 MILLION/UL (ref 3.8–5.1)
SODIUM SERPL-SCNC: 140 MMOL/L (ref 135–146)
TRIGL SERPL-MCNC: 113 MG/DL
WBC # BLD AUTO: 12.2 THOUSAND/UL (ref 3.8–10.8)

## 2025-09-03 ENCOUNTER — OFFICE VISIT (OUTPATIENT)
Dept: OTOLARYNGOLOGY | Facility: CLINIC | Age: 75
End: 2025-09-03
Payer: MEDICARE

## 2025-09-03 VITALS
SYSTOLIC BLOOD PRESSURE: 133 MMHG | HEIGHT: 66 IN | TEMPERATURE: 98 F | WEIGHT: 151 LBS | HEART RATE: 70 BPM | BODY MASS INDEX: 24.27 KG/M2 | DIASTOLIC BLOOD PRESSURE: 67 MMHG

## 2025-09-03 DIAGNOSIS — H92.02 LEFT EAR PAIN: Primary | ICD-10-CM

## 2025-09-03 DIAGNOSIS — H93.8X2 SENSATION OF FULLNESS IN LEFT EAR: ICD-10-CM

## 2025-09-03 PROCEDURE — 1036F TOBACCO NON-USER: CPT | Performed by: NURSE PRACTITIONER

## 2025-09-03 PROCEDURE — 99212 OFFICE O/P EST SF 10 MIN: CPT

## 2025-09-03 PROCEDURE — 99203 OFFICE O/P NEW LOW 30 MIN: CPT | Performed by: NURSE PRACTITIONER

## 2025-09-03 PROCEDURE — 1159F MED LIST DOCD IN RCRD: CPT | Performed by: NURSE PRACTITIONER

## 2025-09-03 PROCEDURE — 1126F AMNT PAIN NOTED NONE PRSNT: CPT | Performed by: NURSE PRACTITIONER

## 2025-09-03 PROCEDURE — 3078F DIAST BP <80 MM HG: CPT | Performed by: NURSE PRACTITIONER

## 2025-09-03 PROCEDURE — 3008F BODY MASS INDEX DOCD: CPT | Performed by: NURSE PRACTITIONER

## 2025-09-03 PROCEDURE — 1160F RVW MEDS BY RX/DR IN RCRD: CPT | Performed by: NURSE PRACTITIONER

## 2025-09-03 PROCEDURE — 3075F SYST BP GE 130 - 139MM HG: CPT | Performed by: NURSE PRACTITIONER

## 2025-09-03 ASSESSMENT — ENCOUNTER SYMPTOMS
LOSS OF SENSATION IN FEET: 0
OCCASIONAL FEELINGS OF UNSTEADINESS: 0
DEPRESSION: 0

## 2025-09-03 ASSESSMENT — PAIN SCALES - GENERAL: PAINLEVEL_OUTOF10: 0-NO PAIN

## 2026-05-15 ENCOUNTER — APPOINTMENT (OUTPATIENT)
Age: 76
End: 2026-05-15
Payer: MEDICARE

## 2026-05-19 ENCOUNTER — APPOINTMENT (OUTPATIENT)
Dept: CARDIOLOGY | Facility: CLINIC | Age: 76
End: 2026-05-19
Payer: MEDICARE

## (undated) DEVICE — Device

## (undated) DEVICE — BONE, MILL, MIDAS REX, DUAL BLADE, ELECTRIC

## (undated) DEVICE — SUTURE, VICRYL, 0, 18 IN, UNDYED

## (undated) DEVICE — COVER, CART, 45 X 27 X 48 IN, CLEAR

## (undated) DEVICE — DRAPE, C-ARM IMAGE

## (undated) DEVICE — ELECTRODE, ELECTROSURGICAL, BLADE, INSULATED, ENT/IMA, STERILE

## (undated) DEVICE — DRESSING, MEPILEX, BORDER FLEX, 6 X 8

## (undated) DEVICE — TUBING, SMOKE EVAC, 3/8 X 10 FT

## (undated) DEVICE — MARKER, SKIN, RULER AND LABEL PACK, CUSTOM

## (undated) DEVICE — SPONGE, HEMOSTATIC, GELATIN, SURGIFOAM, 8 X 12.5 CM X 10 MM

## (undated) DEVICE — SEALANT, HEMOSTATIC, FLOSEAL, 10 ML

## (undated) DEVICE — BUR, 3MM X 3.8MM, PRECISION, NEURO DRILL

## (undated) DEVICE — APPLICATOR, CHLORAPREP, W/ORANGE TINT, 26ML

## (undated) DEVICE — DRESSING, MEPILEX, POST OP, 8 X 4

## (undated) DEVICE — ELECTRODE, ELECTROSURGICAL, BLADE EXT 4 INCH, INSULATED

## (undated) DEVICE — PAD, GROUNDING, ELECTROSURGICAL, W/9 FT CABLE, POLYHESIVE II, ADULT, LF

## (undated) DEVICE — SEALER, BIPOLAR, AQUA MANTYS 6.0

## (undated) DEVICE — CAUTERY, PENCIL, PUSH BUTTON, SMOKE EVAC, 70MM

## (undated) DEVICE — MANIFOLD, 4 PORT NEPTUNE STANDARD

## (undated) DEVICE — CLOSURE SYSTEM, DERMABOND, PRINEO, 22CM, STERILE

## (undated) DEVICE — DRAIN, WOUND, ROUND, W/TROCAR, HOLE PATTERN, 10 IN, MEDIUM, 1/8 X 49 IN

## (undated) DEVICE — WOUND SYSTEM, DEBRIDEMENT & CLEANING, O.R DUOPAK

## (undated) DEVICE — SPONGE GAUZE, XRAY SC+RFID, 4X4 16 PLY, STERILE

## (undated) DEVICE — EVACUATOR, WOUND, CLOSED, 3 SPRING, 400 CC, Y CONNECTING TUBE

## (undated) DEVICE — TIP,  ELECTRODE COATED INSULATED, EXTENDED, LF

## (undated) DEVICE — SUTURE, VICRYL, 4-0, 18 IN, UNDYED BR PS-2

## (undated) DEVICE — SPONGE, LAP, XRAY DECT, SC+RFID, 12X12, STERILE

## (undated) DEVICE — DRAPE, SHEET, FAN FOLDED, HALF, 44 X 58 IN, DISPOSABLE, LF, STERILE

## (undated) DEVICE — TAPE, SILK, DURAPORE, 3 IN X 10 YD, LF

## (undated) DEVICE — DRESSING, MEPILEX, BORDER FLEX, 3 X 3

## (undated) DEVICE — SPHERE, STEALTHSTATION, 5-PK

## (undated) DEVICE — KIT, PATIENT CARE, JACKSON TABLE W/PRONE-SAFE HEADREST